# Patient Record
Sex: MALE | Race: WHITE | NOT HISPANIC OR LATINO | Employment: OTHER | URBAN - METROPOLITAN AREA
[De-identification: names, ages, dates, MRNs, and addresses within clinical notes are randomized per-mention and may not be internally consistent; named-entity substitution may affect disease eponyms.]

---

## 2017-03-24 RX ORDER — NIFEDIPINE 30 MG/1
TABLET, FILM COATED, EXTENDED RELEASE ORAL
Qty: 90 TABLET | Refills: 2 | Status: SHIPPED | OUTPATIENT
Start: 2017-03-24 | End: 2017-06-19 | Stop reason: SDUPTHER

## 2017-04-12 DIAGNOSIS — I10 ESSENTIAL HYPERTENSION: Primary | ICD-10-CM

## 2017-04-18 LAB
ALBUMIN SERPL-MCNC: 4 G/DL (ref 3.5–5.2)
ALBUMIN/GLOB SERPL: 1.3 G/DL
ALP SERPL-CCNC: 93 U/L (ref 39–117)
ALT SERPL W P-5'-P-CCNC: 14 U/L (ref 1–41)
ANION GAP SERPL CALCULATED.3IONS-SCNC: 12 MMOL/L
AST SERPL-CCNC: 18 U/L (ref 1–40)
BILIRUB SERPL-MCNC: 0.6 MG/DL (ref 0.1–1.2)
BUN BLD-MCNC: 13 MG/DL (ref 8–23)
BUN/CREAT SERPL: 16.3 (ref 7–25)
CALCIUM SPEC-SCNC: 9.1 MG/DL (ref 8.6–10.5)
CHLORIDE SERPL-SCNC: 102 MMOL/L (ref 98–107)
CHOLEST SERPL-MCNC: 137 MG/DL (ref 0–200)
CO2 SERPL-SCNC: 27 MMOL/L (ref 22–29)
CREAT BLD-MCNC: 0.8 MG/DL (ref 0.76–1.27)
GFR SERPL CREATININE-BSD FRML MDRD: 98 ML/MIN/1.73
GLOBULIN UR ELPH-MCNC: 3 GM/DL
GLUCOSE BLD-MCNC: 103 MG/DL (ref 65–99)
HDLC SERPL-MCNC: 60 MG/DL (ref 40–60)
LDLC SERPL CALC-MCNC: 63 MG/DL (ref 0–100)
LDLC/HDLC SERPL: 1.04 {RATIO}
POTASSIUM BLD-SCNC: 4.7 MMOL/L (ref 3.5–5.2)
PROT SERPL-MCNC: 7 G/DL (ref 6–8.5)
SODIUM BLD-SCNC: 141 MMOL/L (ref 136–145)
TRIGL SERPL-MCNC: 72 MG/DL (ref 0–150)
VLDLC SERPL-MCNC: 14.4 MG/DL (ref 5–40)

## 2017-04-18 PROCEDURE — 80061 LIPID PANEL: CPT | Performed by: FAMILY MEDICINE

## 2017-04-18 PROCEDURE — 80053 COMPREHEN METABOLIC PANEL: CPT | Performed by: FAMILY MEDICINE

## 2017-04-25 ENCOUNTER — OFFICE VISIT (OUTPATIENT)
Dept: FAMILY MEDICINE CLINIC | Facility: CLINIC | Age: 62
End: 2017-04-25

## 2017-04-25 VITALS
TEMPERATURE: 97.8 F | SYSTOLIC BLOOD PRESSURE: 138 MMHG | DIASTOLIC BLOOD PRESSURE: 64 MMHG | HEART RATE: 61 BPM | HEIGHT: 67 IN | BODY MASS INDEX: 48.97 KG/M2 | OXYGEN SATURATION: 97 % | WEIGHT: 312 LBS

## 2017-04-25 DIAGNOSIS — Z00.00 MEDICARE ANNUAL WELLNESS VISIT, INITIAL: Primary | ICD-10-CM

## 2017-04-25 DIAGNOSIS — G47.30 SLEEP APNEA, UNSPECIFIED TYPE: ICD-10-CM

## 2017-04-25 DIAGNOSIS — Z12.5 PROSTATE CANCER SCREENING: ICD-10-CM

## 2017-04-25 DIAGNOSIS — R35.0 URINARY FREQUENCY: ICD-10-CM

## 2017-04-25 LAB
DEVELOPER EXPIRATION DATE: NORMAL
DEVELOPER LOT NUMBER: NORMAL
EXPIRATION DATE: NORMAL
FECAL OCCULT BLOOD SCREEN, POC: NEGATIVE
Lab: NORMAL
NEGATIVE CONTROL: NEGATIVE
POSITIVE CONTROL: POSITIVE
PSA SERPL-MCNC: 1.8 NG/ML (ref 0–4)

## 2017-04-25 PROCEDURE — 99213 OFFICE O/P EST LOW 20 MIN: CPT | Performed by: FAMILY MEDICINE

## 2017-04-25 PROCEDURE — G0438 PPPS, INITIAL VISIT: HCPCS | Performed by: FAMILY MEDICINE

## 2017-04-25 PROCEDURE — 84153 ASSAY OF PSA TOTAL: CPT | Performed by: FAMILY MEDICINE

## 2017-04-25 PROCEDURE — 96160 PT-FOCUSED HLTH RISK ASSMT: CPT | Performed by: FAMILY MEDICINE

## 2017-04-25 PROCEDURE — 82270 OCCULT BLOOD FECES: CPT | Performed by: FAMILY MEDICINE

## 2017-04-25 RX ORDER — CIPROFLOXACIN 500 MG/1
TABLET, FILM COATED ORAL
Qty: 30 TABLET | Refills: 0 | Status: SHIPPED | OUTPATIENT
Start: 2017-04-25 | End: 2017-06-19

## 2017-04-25 RX ORDER — FLUTICASONE PROPIONATE 50 MCG
2 SPRAY, SUSPENSION (ML) NASAL DAILY
COMMUNITY
End: 2017-06-19 | Stop reason: SDUPTHER

## 2017-04-25 NOTE — PATIENT INSTRUCTIONS
Medicare Wellness  Personal Prevention Plan of Service     Date of Office Visit:  2017  Encounter Provider:  Braulio Sorto MD  Place of Service:  [facility] AND INTERNAL [facility]  Patient Name: Ryley Vazquez  :  1955    As part of the Medicare Wellness portion of your visit today, we are providing you with this personalized preventive plan of services (PPPS). This plan is based upon recommendations of the [place] Preventive Services Task Force (USPSTF) and the Advisory Committee on Immunization Practices (ACIP).    This lists the preventive care services that should be considered, and provides dates of when you are due. Items listed as completed are up-to-date and do not require any further intervention.    Health Maintenance   Topic Date Due   • TDAP/TD VACCINES (1 - Tdap) 1973   • HEPATITIS C SCREENING  03/15/2016   • MEDICARE ANNUAL WELLNESS  03/15/2016   • ZOSTER VACCINE  03/15/2016   • COLONOSCOPY  04/15/2025   • PNEUMOCOCCAL VACCINE (19-64 MEDIUM RISK)  Addressed   • INFLUENZA VACCINE  Completed       No orders of the defined types were placed in this encounter.      No Follow-up on file.

## 2017-04-25 NOTE — PROGRESS NOTES
QUICK REFERENCE INFORMATION:  The ABCs of the Annual Wellness Visit    Initial Medicare Wellness Visit    HEALTH RISK ASSESSMENT    1955    Recent Hospitalizations:  No recent hospitalization(s)..        Current Medical Providers:  Patient Care Team:  Braulio Sorto MD as PCP - General        Smoking Status:  History   Smoking Status   • Current Every Day Smoker   • Packs/day: 1.00   • Years: 14.00   Smokeless Tobacco   • Never Used       Alcohol Consumption:  History   Alcohol Use No     Comment: hx:alcohol abuse       Depression Screen:   PHQ-9 Depression Screening 4/25/2017   Little interest or pleasure in doing things 0   Feeling down, depressed, or hopeless 0   Trouble falling or staying asleep, or sleeping too much 0   Feeling tired or having little energy 0   Poor appetite or overeating 0   Feeling bad about yourself - or that you are a failure or have let yourself or your family down 0   Trouble concentrating on things, such as reading the newspaper or watching television 0   Moving or speaking so slowly that other people could have noticed. Or the opposite - being so fidgety or restless that you have been moving around a lot more than usual 0   Thoughts that you would be better off dead, or of hurting yourself in some way 0   PHQ-9 Total Score 0   If you checked off any problems, how difficult have these problems made it for you to do your work, take care of things at home, or get along with other people? Not difficult at all       Health Habits and Functional and Cognitive Screening:  Functional & Cognitive Status 4/25/2017   Do you have difficulty preparing food and eating? No   Do you have difficulty bathing yourself? No   Do you have difficulty getting dressed? No   Do you have difficulty using the toilet? No   Do you have difficulty moving around from place to place? No   In the past year have you fallen or experienced a near fall? No   Do you need help using the phone?  No   Are you deaf or do you  have serious difficulty hearing?  No   Do you need help with transportation? No   Do you need help shopping? No   Do you need help preparing meals?  No   Do you need help with housework?  No   Do you need help with laundry? No   Do you need help taking your medications? No   Do you need help managing money? No   Do you have difficulty concentrating, remembering or making decisions? No       Health Habits  Current Diet: Unhealthy Diet  Dental Exam: Not up to date  Eye Exam: Up to date  Exercise (times per week): 0 times per week  Current Exercise Activities Include: None          Does the patient have evidence of cognitive impairment? No    Asiprin use counseling: Start ASA 81 mg daily       Recent Lab Results:    Visual Acuity:  No exam data present    Age-appropriate Screening Schedule:  Refer to the list below for future screening recommendations based on patient's age, sex and/or medical conditions. Orders for these recommended tests are listed in the plan section. The patient has been provided with a written plan.    Health Maintenance   Topic Date Due   • TDAP/TD VACCINES (1 - Tdap) 02/08/1973   • ZOSTER VACCINE  03/15/2016   • COLONOSCOPY  04/15/2025   • PNEUMOCOCCAL VACCINE (19-64 MEDIUM RISK)  Addressed   • INFLUENZA VACCINE  Completed        Subjective   History of Present Illness    Ryley Vazquez is a 62 y.o. male who presents for an Annual Wellness Visit.    The following portions of the patient's history were reviewed and updated as appropriate: allergies, current medications, past family history, past medical history, past social history, past surgical history and problem list.    Outpatient Medications Prior to Visit   Medication Sig Dispense Refill   • albuterol (PROVENTIL HFA;VENTOLIN HFA) 108 (90 BASE) MCG/ACT inhaler Inhale 2 puffs every 4 (four) hours as needed for wheezing. 1 inhaler 11   • furosemide (LASIX) 20 MG tablet TAKE ONE TABLET BY MOUTH DAILY 90 tablet 2   • NIFEdipine CC (ADALAT CC)  "30 MG 24 hr tablet TAKE ONE TABLET BY MOUTH DAILY 90 tablet 2   • traZODone (DESYREL) 100 MG tablet Take 100 mg by mouth at night as needed.       No facility-administered medications prior to visit.        Patient Active Problem List   Diagnosis   • Sleep apnea   • Sinusitis   • Hypertension   • Asthmatic bronchitis   • Arthritis       Advance Care Planning:  has an advance directive - a copy HAS NOT been provided    Identification of Risk Factors:  Risk factors include: weight , unhealthy diet and inactivity.    Review of Systems    Compared to one year ago, the patient feels his physical health is the same.  Compared to one year ago, the patient feels his mental health is the same.    Objective     Physical Exam    Vitals:    04/25/17 1430   BP: 138/64   BP Location: Left arm   Patient Position: Sitting   Cuff Size: Large Adult   Pulse: 61   Temp: 97.8 °F (36.6 °C)   TempSrc: Oral   SpO2: 97%   Weight: (!) 312 lb (142 kg)   Height: 67\" (170.2 cm)   PainSc:   4   PainLoc: Generalized       Body mass index is 48.87 kg/(m^2).  Discussed the patient's BMI with him. The BMI is above average; BMI management plan is completed.    Assessment/Plan   Patient Self-Management and Personalized Health Advice  The patient has been provided with information about: diet, exercise and weight management and preventive services including:   · none.    Visit Diagnoses:    ICD-10-CM ICD-9-CM   1. Urinary frequency R35.0 788.41       No orders of the defined types were placed in this encounter.      Outpatient Encounter Prescriptions as of 4/25/2017   Medication Sig Dispense Refill   • albuterol (PROVENTIL HFA;VENTOLIN HFA) 108 (90 BASE) MCG/ACT inhaler Inhale 2 puffs every 4 (four) hours as needed for wheezing. 1 inhaler 11   • fluticasone (FLONASE) 50 MCG/ACT nasal spray 2 sprays into each nostril Daily.     • furosemide (LASIX) 20 MG tablet TAKE ONE TABLET BY MOUTH DAILY 90 tablet 2   • NIFEdipine CC (ADALAT CC) 30 MG 24 hr tablet " TAKE ONE TABLET BY MOUTH DAILY 90 tablet 2   • traZODone (DESYREL) 100 MG tablet Take 100 mg by mouth at night as needed.       No facility-administered encounter medications on file as of 4/25/2017.        Reviewed use of high risk medication in the elderly: not applicable  Reviewed for potential of harmful drug interactions in the elderly: not applicable    Follow Up:  No Follow-up on file.     An After Visit Summary and PPPS with all of these plans were given to the patient.        SUBJECTIVE:  The patient is a 62-year-old white male who is here for a couple issues.  He had general blood work done last week.  Refer to results.  His numbers look good.  He complains of urinary frequency for 4 days.  He is due a prostate exam.  He has sleep apnea wants to be referred for a study for this.     PAST MEDICAL HISTORY:  Reviewed.    REVIEW OF SYSTEMS:  Please see above; 14 point ROS otherwise negative.      OBJECTIVE: Vitals signs are reviewed and are stable.    HEENT: PERRLA.  []  Neck:  Supple.  []  Lungs:  Clear.    Heart:  Regular rate and rhythm.  []  Abdomen:   Soft, nontender.  []  Extremities:  No cyanosis, clubbing or edema.  []  Rectal: Prostate is 2+ mildly tender no masses Hemoccult negative.  Analysis is ordered and pending.  Patient is having a difficult time voiding here in the office.  He will bring one back if unsuccessful here.    ASSESSMENT:    []Prostate exam/possible prostatitis  SLEEP apnea  Hypertension    PLAN:  []Cipro 500 mg twice a day.  Patient is referred for sleep study.  PSA is ordered and pending.  A call on his labs.  He will notify me if problems or if not better in a couple days.  Much of this encounter note is an electronic transcription/translation of spoken language to printed text.  The electronic translation of spoken language may permit erroneous, or at times, nonsensical words or phrases to be inadvertently transcribed.  Although I have reviewed the note for such errors, some may  still exist.

## 2017-06-19 ENCOUNTER — OFFICE VISIT (OUTPATIENT)
Dept: FAMILY MEDICINE CLINIC | Facility: CLINIC | Age: 62
End: 2017-06-19

## 2017-06-19 VITALS
SYSTOLIC BLOOD PRESSURE: 140 MMHG | BODY MASS INDEX: 48.18 KG/M2 | HEART RATE: 91 BPM | OXYGEN SATURATION: 94 % | DIASTOLIC BLOOD PRESSURE: 80 MMHG | WEIGHT: 307 LBS | TEMPERATURE: 98.2 F | HEIGHT: 67 IN

## 2017-06-19 DIAGNOSIS — E66.01 MORBID OBESITY DUE TO EXCESS CALORIES (HCC): ICD-10-CM

## 2017-06-19 DIAGNOSIS — I10 ESSENTIAL HYPERTENSION: ICD-10-CM

## 2017-06-19 DIAGNOSIS — J45.21 ASTHMATIC BRONCHITIS, MILD INTERMITTENT, WITH ACUTE EXACERBATION: Primary | ICD-10-CM

## 2017-06-19 DIAGNOSIS — Z72.0 TOBACCO ABUSE: ICD-10-CM

## 2017-06-19 DIAGNOSIS — J30.2 SEASONAL ALLERGIC RHINITIS, UNSPECIFIED ALLERGIC RHINITIS TRIGGER: ICD-10-CM

## 2017-06-19 DIAGNOSIS — G47.00 INSOMNIA, UNSPECIFIED TYPE: ICD-10-CM

## 2017-06-19 DIAGNOSIS — J01.00 ACUTE MAXILLARY SINUSITIS, RECURRENCE NOT SPECIFIED: ICD-10-CM

## 2017-06-19 PROCEDURE — 99213 OFFICE O/P EST LOW 20 MIN: CPT | Performed by: NURSE PRACTITIONER

## 2017-06-19 RX ORDER — AMOXICILLIN 875 MG/1
875 TABLET, COATED ORAL 2 TIMES DAILY
Qty: 20 TABLET | Refills: 0 | Status: SHIPPED | OUTPATIENT
Start: 2017-06-19 | End: 2017-08-02

## 2017-06-19 RX ORDER — FUROSEMIDE 20 MG/1
20 TABLET ORAL DAILY
Qty: 90 TABLET | Refills: 3 | Status: SHIPPED | OUTPATIENT
Start: 2017-06-19 | End: 2018-09-27 | Stop reason: SDUPTHER

## 2017-06-19 RX ORDER — TRAZODONE HYDROCHLORIDE 100 MG/1
100 TABLET ORAL NIGHTLY PRN
Qty: 30 TABLET | Refills: 5 | Status: SHIPPED | OUTPATIENT
Start: 2017-06-19 | End: 2020-01-01

## 2017-06-19 RX ORDER — NIFEDIPINE 30 MG/1
90 TABLET, FILM COATED, EXTENDED RELEASE ORAL DAILY
Qty: 90 TABLET | Refills: 3 | Status: SHIPPED | OUTPATIENT
Start: 2017-06-19 | End: 2018-05-29 | Stop reason: DRUGHIGH

## 2017-06-19 RX ORDER — FLUTICASONE PROPIONATE 50 MCG
2 SPRAY, SUSPENSION (ML) NASAL DAILY
Qty: 1 EACH | Refills: 11 | Status: SHIPPED | OUTPATIENT
Start: 2017-06-19 | End: 2017-10-09

## 2017-06-19 NOTE — PROGRESS NOTES
Subjective   Ryley Vazquez is a 62 y.o. male.     History of Present Illness   C/o sinus tenderness and congestion x 3 wks, no ear pain or sore throat, with SOA and wheezing worse HS, with prod cough, no fevers, no one sick at home or work, went to [facility] and tx allergies on flonase NS daily but states gets this every year with season change, Was last tx amox 875 07/16 for asthmatic bronchitis and ceftin 250 mg 09/17 resolved, NKDA  Also request refill on nifedipine 30 mg daily and lasix 20 mg every other day, no CP dizziness HA but some LE edema, on trazodone 100 mg prn sleep, pending sleep study later this month    The following portions of the patient's history were reviewed and updated as appropriate: allergies, current medications, past family history, past medical history, past social history, past surgical history and problem list.    Review of Systems   Constitutional: Negative for fever.   HENT: Positive for congestion, facial swelling, postnasal drip and sinus pressure. Negative for ear discharge, ear pain, hearing loss, mouth sores, sore throat, trouble swallowing and voice change.    Respiratory: Positive for cough, shortness of breath and wheezing.    Cardiovascular: Positive for leg swelling. Negative for chest pain and palpitations.   Allergic/Immunologic: Positive for environmental allergies.   Neurological: Negative for dizziness and headaches.   Hematological: Positive for adenopathy.   Psychiatric/Behavioral: Positive for sleep disturbance. Negative for suicidal ideas.   All other systems reviewed and are negative.      Objective   Physical Exam   Constitutional: He is oriented to person, place, and time. He appears well-developed and well-nourished.   HENT:   Head: Normocephalic and atraumatic.   Right Ear: Hearing normal. Tympanic membrane is erythematous.   Left Ear: Hearing normal. Tympanic membrane is erythematous.   Nose: Mucosal edema present. Right sinus exhibits maxillary sinus tenderness.  Right sinus exhibits no frontal sinus tenderness. Left sinus exhibits maxillary sinus tenderness. Left sinus exhibits no frontal sinus tenderness.   Mouth/Throat: Posterior oropharyngeal edema present. No oropharyngeal exudate or posterior oropharyngeal erythema.   Eyes: Conjunctivae and EOM are normal. Pupils are equal, round, and reactive to light.   Neck: Normal range of motion. Neck supple. No thyromegaly present.   Cardiovascular: Normal rate, regular rhythm and normal heart sounds.    Pulmonary/Chest: Effort normal. He has wheezes (right middle and lower lobe).   Musculoskeletal: Normal range of motion.   Lymphadenopathy:     He has cervical adenopathy.   Neurological: He is alert and oriented to person, place, and time.   Skin: Skin is warm and dry.   Psychiatric: He has a normal mood and affect. His behavior is normal. Judgment and thought content normal.   Vitals reviewed.      Assessment/Plan   Ryley was seen today for sinusitis and med refill.    Diagnoses and all orders for this visit:    Asthmatic bronchitis, mild intermittent, with acute exacerbation    Acute maxillary sinusitis, recurrence not specified    Tobacco abuse    Essential hypertension    Morbid obesity due to excess calories    Insomnia, unspecified type    Seasonal allergic rhinitis, unspecified allergic rhinitis trigger    Other orders  -     NIFEdipine CC (ADALAT CC) 30 MG 24 hr tablet; Take 3 tablets by mouth Daily.  -     traZODone (DESYREL) 100 MG tablet; Take 1 tablet by mouth At Night As Needed for Sleep.  -     fluticasone (FLONASE) 50 MCG/ACT nasal spray; 2 sprays into each nostril Daily.  -     furosemide (LASIX) 20 MG tablet; Take 1 tablet by mouth Daily.  -     amoxicillin (AMOXIL) 875 MG tablet; Take 1 tablet by mouth 2 (Two) Times a Day.    erx amox 875 BID x 10 days, increase H20 and rest, refill flonase and use albuterol inhaler at home prn, call or RTC if sx persist or worsen, refill nifedipine, lasix, trazodone, Enc  healthy diet and regular exercise for wt loss, enc smoking cessation

## 2017-06-19 NOTE — PATIENT INSTRUCTIONS
erx amox 875 BID x 10 days, increase H20 and rest, refill flonase and use albuterol inhaler at home prn, call or RTC if sx persist or worsen, refill nifedipine, lasix, trazodone, Enc healthy diet and regular exercise for wt loss, enc smoking cessation

## 2017-06-20 ENCOUNTER — TELEPHONE (OUTPATIENT)
Dept: FAMILY MEDICINE CLINIC | Facility: CLINIC | Age: 62
End: 2017-06-20

## 2017-06-20 RX ORDER — NIFEDIPINE 30 MG/1
90 TABLET, FILM COATED, EXTENDED RELEASE ORAL DAILY
Qty: 90 TABLET | Refills: 3 | Status: CANCELLED | OUTPATIENT
Start: 2017-06-20

## 2017-08-02 ENCOUNTER — OFFICE VISIT (OUTPATIENT)
Dept: FAMILY MEDICINE CLINIC | Facility: CLINIC | Age: 62
End: 2017-08-02

## 2017-08-02 VITALS
HEART RATE: 65 BPM | BODY MASS INDEX: 49.35 KG/M2 | HEIGHT: 67 IN | SYSTOLIC BLOOD PRESSURE: 130 MMHG | OXYGEN SATURATION: 95 % | TEMPERATURE: 98.4 F | RESPIRATION RATE: 16 BRPM | WEIGHT: 314.4 LBS | DIASTOLIC BLOOD PRESSURE: 80 MMHG

## 2017-08-02 DIAGNOSIS — J01.10 ACUTE FRONTAL SINUSITIS, RECURRENCE NOT SPECIFIED: Primary | ICD-10-CM

## 2017-08-02 PROCEDURE — 99213 OFFICE O/P EST LOW 20 MIN: CPT | Performed by: NURSE PRACTITIONER

## 2017-08-02 RX ORDER — AMOXICILLIN 875 MG/1
875 TABLET, COATED ORAL 2 TIMES DAILY
Qty: 14 TABLET | Refills: 0 | Status: SHIPPED | OUTPATIENT
Start: 2017-08-02 | End: 2017-08-09

## 2017-08-02 RX ORDER — ALBUTEROL SULFATE 90 UG/1
2 AEROSOL, METERED RESPIRATORY (INHALATION) EVERY 4 HOURS PRN
Qty: 1 INHALER | Refills: 11 | Status: SHIPPED | OUTPATIENT
Start: 2017-08-02 | End: 2018-09-13 | Stop reason: SDUPTHER

## 2017-08-02 NOTE — PATIENT INSTRUCTIONS
Start amoxicillin 875mg 2x day for 7 days take with food.  Refilled albuterol inhaler, educated patient about use.   Trial Breo, 1 puff daily.   Cont flonase daily.  If symptoms persist 5 days or worsen call office.   Increase fluid intake, get plenty of rest.   Patient agrees with plan of care and understands instructions. Call if worsening symptoms or any problems or concerns.

## 2017-08-02 NOTE — PROGRESS NOTES
Subjective   Ryley Vazquez is a 62 y.o. male.     History of Present Illness   C/o cough,congestion, nasal drainage, HA, symptoms started about 2 weeks ago, he states he gets this a couple of times per year, [place], then got cold, he denies fever, he has nasal congestion, he does use flonase at home, he needs refill on inhaler today. He uses this about once a day regularly. He does have a productive cough, clear in color, he has noticed wheezing, denies SOA, he does smoke about 1/2 ppd for about 25 years.     The following portions of the patient's history were reviewed and updated as appropriate: allergies, current medications, past family history, past medical history, past social history, past surgical history and problem list.    Review of Systems   Constitutional: Negative for chills, diaphoresis and fever.   HENT: Positive for congestion, rhinorrhea and sinus pressure. Negative for ear pain, postnasal drip and sore throat.    Eyes: Negative for pain.   Respiratory: Positive for cough and wheezing. Negative for chest tightness and shortness of breath.    Cardiovascular: Negative for chest pain.   Musculoskeletal: Negative for arthralgias and myalgias.   Skin: Negative for pallor.   Allergic/Immunologic: Positive for environmental allergies.   Neurological: Negative for dizziness, light-headedness and headaches.   All other systems reviewed and are negative.      Objective   Physical Exam   Constitutional: He is oriented to person, place, and time. He appears well-developed and well-nourished.   HENT:   Head: Normocephalic.   Right Ear: Tympanic membrane and ear canal normal.   Left Ear: Ear canal normal. Tympanic membrane is erythematous.   Nose: Right sinus exhibits frontal sinus tenderness. Left sinus exhibits frontal sinus tenderness.   Mouth/Throat: Uvula is midline, oropharynx is clear and moist and mucous membranes are normal.   Eyes: Pupils are equal, round, and reactive to  light.   Neck: Neck supple.   Cardiovascular: Normal rate, regular rhythm and normal heart sounds.    Pulmonary/Chest: Effort normal and breath sounds normal. No respiratory distress. He has no decreased breath sounds. He has no wheezes.   Musculoskeletal: Normal range of motion.   Lymphadenopathy:     He has no cervical adenopathy.   Neurological: He is alert and oriented to person, place, and time.   Skin: Skin is warm and dry.   Psychiatric: He has a normal mood and affect. His behavior is normal. Judgment and thought content normal.   Nursing note and vitals reviewed.      Assessment/Plan   Ryley was seen today for sinusitis.    Diagnoses and all orders for this visit:    Acute frontal sinusitis, recurrence not specified    Other orders  -     albuterol (PROVENTIL HFA;VENTOLIN HFA) 108 (90 BASE) MCG/ACT inhaler; Inhale 2 puffs Every 4 (Four) Hours As Needed for Wheezing.  -     amoxicillin (AMOXIL) 875 MG tablet; Take 1 tablet by mouth 2 (Two) Times a Day for 7 days.              Start amoxicillin 875mg 2x day for 7 days take with food.  Refilled albuterol inhaler, educated patient about use.   Trial Breo, 1 puff daily.   Cont flonase daily.  Encourage smoking cessation.  If symptoms persist 5 days or worsen call office.   Increase fluid intake, get plenty of rest.   Patient agrees with plan of care and understands instructions. Call if worsening symptoms or any problems or concerns.

## 2017-08-07 ENCOUNTER — TELEPHONE (OUTPATIENT)
Dept: FAMILY MEDICINE CLINIC | Facility: CLINIC | Age: 62
End: 2017-08-07

## 2017-08-07 NOTE — TELEPHONE ENCOUNTER
Pt stated breo is working. Wants a rx sent to pharmacy and coupon for rx.   Pt stated it has increased his blood pressure. Running 145-160/ 85-88.  When first takes it, chest feels heavy but it goes away.

## 2017-08-07 NOTE — TELEPHONE ENCOUNTER
If BP elevated and SE should stop medication, we can trial a different inhaler, if chest heaviness or pain go to the ER. If BP remains elevated f/u in office.

## 2017-08-09 ENCOUNTER — OFFICE VISIT (OUTPATIENT)
Dept: FAMILY MEDICINE CLINIC | Facility: CLINIC | Age: 62
End: 2017-08-09

## 2017-08-09 VITALS
TEMPERATURE: 99.5 F | DIASTOLIC BLOOD PRESSURE: 60 MMHG | HEART RATE: 67 BPM | SYSTOLIC BLOOD PRESSURE: 120 MMHG | BODY MASS INDEX: 49.35 KG/M2 | HEIGHT: 67 IN | RESPIRATION RATE: 16 BRPM | WEIGHT: 314.4 LBS | OXYGEN SATURATION: 94 %

## 2017-08-09 DIAGNOSIS — R05.9 COUGH: ICD-10-CM

## 2017-08-09 DIAGNOSIS — I10 ESSENTIAL HYPERTENSION: Primary | ICD-10-CM

## 2017-08-09 DIAGNOSIS — F17.200 CURRENT SMOKER: ICD-10-CM

## 2017-08-09 PROCEDURE — 71020 XR CHEST 2 VW: CPT | Performed by: NURSE PRACTITIONER

## 2017-08-09 PROCEDURE — 99213 OFFICE O/P EST LOW 20 MIN: CPT | Performed by: NURSE PRACTITIONER

## 2017-08-09 PROCEDURE — 93000 ELECTROCARDIOGRAM COMPLETE: CPT | Performed by: NURSE PRACTITIONER

## 2017-08-09 NOTE — PROGRESS NOTES
Subjective   Ryley Vazquez is a 62 y.o. male.     History of Present Illness   Here today to f/u, he was seen on 8/2/17, given trial of breo, he called office liked breo and wanted rx, he also noticed elevated BP at home and chest pains, he states BP at home was 150/80s, high for him, he was also having chest pressure at that time. He states this is resolved. He denies CP today, he has been coughing, mucous breaking up, and has had drainage. He states BP has returned to normal. Normotensive today. He denies SOA, change in vision, LE edema, HA. He sees Dr. Hernandes, last saw in 10/16, echo normal. Saw for irregular heart beat, he also has a hx of sleep apnea, gets CPAP tomorrow. He smokes less than 1 ppd for about 17 years, he has tried chantix but did not help. He states he has not taken lasix in about a month because it made him dizzy.     The following portions of the patient's history were reviewed and updated as appropriate: allergies, current medications, past family history, past medical history, past social history, past surgical history and problem list.    Review of Systems   Constitutional: Negative for chills, diaphoresis and fever.   HENT: Positive for congestion. Negative for ear pain.    Eyes: Negative for photophobia and visual disturbance.   Respiratory: Positive for cough and chest tightness. Negative for shortness of breath and wheezing.    Cardiovascular: Negative for chest pain, palpitations and leg swelling.   Musculoskeletal: Negative for arthralgias and myalgias.   Skin: Negative for pallor.   Neurological: Negative for dizziness, light-headedness and headaches.   All other systems reviewed and are negative.      Objective   Physical Exam   Constitutional: He is oriented to person, place, and time. He appears well-developed and well-nourished.   HENT:   Head: Normocephalic.   Eyes: Pupils are equal, round, and reactive to light.   Neck: Neck supple.   Cardiovascular: Normal rate, regular  rhythm, normal heart sounds and intact distal pulses.    Pulses:       Radial pulses are 2+ on the right side, and 2+ on the left side.        Dorsalis pedis pulses are 2+ on the right side, and 2+ on the left side.        Posterior tibial pulses are 2+ on the right side, and 2+ on the left side.   Pulmonary/Chest: Effort normal. No respiratory distress. He has no decreased breath sounds. He has wheezes in the right upper field and the left upper field. He has no rhonchi. He has no rales. He exhibits no tenderness.   Musculoskeletal: Normal range of motion. He exhibits edema (bilat LE pitting 1+. ).   Neurological: He is alert and oriented to person, place, and time.   Skin: Skin is warm and dry.   Psychiatric: He has a normal mood and affect. His behavior is normal. Judgment and thought content normal.   Nursing note and vitals reviewed.  cxr today for cough, current smoker, no comparison noted, shows NAD, awaiting radiology over read.     Assessment/Plan   Ryley was seen today for follow-up.    Diagnoses and all orders for this visit:    Essential hypertension  -     XR Chest 2 View    Current smoker  -     XR Chest 2 View    Cough  -     XR Chest 2 View    Other orders  -     Fluticasone Furoate-Vilanterol (BREO ELLIPTA) 100-25 MCG/INH aerosol powder ; Inhale 1 puff Daily.        cxr today, will call with results.  EKG today, faxed to [place] [facility], per Dr. Perez, call back from office. similar to last EKG, f/u in office. Patient to call with appt as he is established there.   Discussed plan of care with Dr. Trejo.  Cont medications as prescribed, cont breo as tolerated.   Cont lasix, may break in half if needed for dizziness.   Encourage low salt diet.   Increase fluid intake, get plenty of rest.   If CP, SOA, worsening symptoms advised to go to the ER.   Patient agrees with plan of care and understands instructions. Call if worsening symptoms or any problems or concerns.

## 2017-08-09 NOTE — PATIENT INSTRUCTIONS
"cxr today, will call with results.  EKG today, faxed to [facility], per Dr. Perez, call back from office. similar to last EKG, f/u in office. Patient to call with appt as he is established there.   Discussed plan of care with Dr. Trejo.  Cont medications as prescribed, cont breo as tolerated.   Cont lasix, may break in half if needed for dizziness.   Encourage low salt diet.   Increase fluid intake, get plenty of rest.   If CP, SOA, worsening symptoms advised to go to the ER.   Patient agrees with plan of care and understands instructions. Call if worsening symptoms or any problems or concerns.     DASH Eating Plan  DASH stands for \"Dietary Approaches to Stop Hypertension.\" The DASH eating plan is a healthy eating plan that has been shown to reduce high blood pressure (hypertension). Additional health benefits may include reducing the risk of type 2 diabetes mellitus, heart disease, and stroke. The DASH eating plan may also help with weight loss.  WHAT DO I NEED TO KNOW ABOUT THE DASH EATING PLAN?  For the DASH eating plan, you will follow these general guidelines:  · Choose foods with less than 150 milligrams of sodium per serving (as listed on the food label).  · Use salt-free seasonings or herbs instead of table salt or sea salt.  · Check with your health care provider or pharmacist before using salt substitutes.  · Eat lower-sodium products. These are often labeled as \"low-sodium\" or \"no salt added.\"  · Eat fresh foods. Avoid eating a lot of canned foods.  · Eat more vegetables, fruits, and low-fat dairy products.  · Choose whole grains. Look for the word \"whole\" as the first word in the ingredient list.  · Choose fish and skinless chicken or turkey more often than red meat. Limit fish, poultry, and meat to 6 oz (170 g) each day.  · Limit sweets, desserts, sugars, and sugary drinks.  · Choose heart-healthy fats.  · Eat more home-cooked food and less restaurant, buffet, and fast food.  · Limit " fried foods.  · Do not black foods. Cook foods using methods such as baking, boiling, grilling, and broiling instead.  · When eating at a restaurant, ask that your food be prepared with less salt, or no salt if possible.  WHAT FOODS CAN I EAT?  Seek help from a dietitian for individual calorie needs.  Grains  Whole grain or whole wheat bread. Brown rice. Whole grain or whole wheat pasta. Quinoa, bulgur, and whole grain cereals. Low-sodium cereals. Corn or whole wheat flour tortillas. Whole grain cornbread. Whole grain crackers. Low-sodium crackers.  Vegetables  Fresh or frozen vegetables (raw, steamed, roasted, or grilled). Low-sodium or reduced-sodium tomato and vegetable juices. Low-sodium or reduced-sodium tomato sauce and paste. Low-sodium or reduced-sodium canned vegetables.   Fruits  All fresh, canned (in natural juice), or frozen fruits.  Meat and Other Protein Products  Ground beef (85% or leaner), grass-fed beef, or beef trimmed of fat. Skinless chicken or turkey. Ground chicken or turkey. Pork trimmed of fat. All fish and seafood. Eggs. Dried beans, peas, or lentils. Unsalted nuts and seeds. Unsalted canned beans.  Dairy  Low-fat dairy products, such as skim or 1% milk, 2% or reduced-fat cheeses, low-fat ricotta or cottage cheese, or plain low-fat yogurt. Low-sodium or reduced-sodium cheeses.  Fats and Oils  Tub margarines without trans fats. Light or reduced-fat mayonnaise and salad dressings (reduced sodium). Avocado. Safflower, olive, or canola oils. Natural peanut or almond butter.  Other  Unsalted popcorn and pretzels.  The items listed above may not be a complete list of recommended foods or beverages. Contact your dietitian for more options.  WHAT FOODS ARE NOT RECOMMENDED?  Grains  White bread. White pasta. White rice. Refined cornbread. Bagels and croissants. Crackers that contain trans fat.  Vegetables  Creamed or fried vegetables. Vegetables in a cheese sauce. Regular canned vegetables. Regular  canned tomato sauce and paste. Regular tomato and vegetable juices.  Fruits  Canned fruit in light or heavy syrup. Fruit juice.  Meat and Other Protein Products  Fatty cuts of meat. Ribs, chicken wings, benavides, sausage, bologna, salami, chitterlings, fatback, hot dogs, bratwurst, and packaged luncheon meats. Salted nuts and seeds. Canned beans with salt.  Dairy  Whole or 2% milk, cream, half-and-half, and cream cheese. Whole-fat or sweetened yogurt. Full-fat cheeses or blue cheese. Nondairy creamers and whipped toppings. Processed cheese, cheese spreads, or cheese curds.  Condiments  Onion and garlic salt, seasoned salt, table salt, and sea salt. Canned and packaged gravies. Worcestershire sauce. Tartar sauce. Barbecue sauce. Teriyaki sauce. Soy sauce, including reduced sodium. Steak sauce. Fish sauce. Oyster sauce. Cocktail sauce. Horseradish. Ketchup and mustard. Meat flavorings and tenderizers. Bouillon cubes. Hot sauce. Tabasco sauce. Marinades. Taco seasonings. Relishes.  Fats and Oils  Butter, stick margarine, lard, shortening, ghee, and benavides fat. Coconut, palm kernel, or palm oils. Regular salad dressings.  Other  Pickles and olives. Salted popcorn and pretzels.  The items listed above may not be a complete list of foods and beverages to avoid. Contact your dietitian for more information.  WHERE CAN I FIND MORE INFORMATION?  [employer]: www.nhlbi.nih.gov/health/health-topics/topics/dash/     This information is not intended to replace advice given to you by your health care provider. Make sure you discuss any questions you have with your health care provider.     Document Released: 12/06/2012 Document Revised: 04/10/2017 Document Reviewed: 10/22/2014  [employer] Interactive Patient Education ©2017 [employer]

## 2017-08-10 NOTE — PROGRESS NOTES
Procedure     ECG 12 Lead  Date/Time: 8/9/2017 2:22 PM  Performed by: ELVIRA CRUMP  Authorized by: ELVIRA CRUMP   Previous ECG: no previous ECG available  Rhythm: sinus rhythm  Rate: normal  QRS axis: normal  Clinical impression: normal ECG  Comments: Discussed with Dr. Trejo, faxed to Dr. Perez, cardiology.

## 2017-08-11 ENCOUNTER — TELEPHONE (OUTPATIENT)
Dept: FAMILY MEDICINE CLINIC | Facility: CLINIC | Age: 62
End: 2017-08-11

## 2017-08-11 NOTE — TELEPHONE ENCOUNTER
----- Message from AGUSTÍN Evans sent at 8/11/2017  7:59 AM EDT -----  Please call patient with results. cxr was normal, f/u in office if symptoms persist.    Pt informed of CXR results

## 2017-08-15 ENCOUNTER — OFFICE VISIT (OUTPATIENT)
Dept: FAMILY MEDICINE CLINIC | Facility: CLINIC | Age: 62
End: 2017-08-15

## 2017-08-15 VITALS
WEIGHT: 314 LBS | RESPIRATION RATE: 20 BRPM | DIASTOLIC BLOOD PRESSURE: 70 MMHG | BODY MASS INDEX: 49.28 KG/M2 | OXYGEN SATURATION: 93 % | HEIGHT: 67 IN | HEART RATE: 78 BPM | SYSTOLIC BLOOD PRESSURE: 140 MMHG | TEMPERATURE: 99.1 F

## 2017-08-15 DIAGNOSIS — Z72.0 TOBACCO ABUSE: ICD-10-CM

## 2017-08-15 DIAGNOSIS — Z88.9 MULTIPLE ALLERGIES: Primary | ICD-10-CM

## 2017-08-15 PROCEDURE — 99213 OFFICE O/P EST LOW 20 MIN: CPT | Performed by: FAMILY MEDICINE

## 2017-08-15 NOTE — PROGRESS NOTES
SUBJECTIVE:  The patient is a 62-year-old white male who comes in for follow-up.  He was treated for a sinus infection on August 9 and is better.  However he suffers from chronic allergy drainage cough symptoms.  He recently got a CPAP which helps his sleep however he wakes up with major congestion in the mornings.  He did start smoking again.     PAST MEDICAL HISTORY:  Reviewed.    REVIEW OF SYSTEMS:  Please see above; 14 point ROS otherwise negative.      OBJECTIVE: Vitals signs are reviewed and are stable.    HEENT: PERRLA.   Left nasal turbinate is swollen.  Neck:  Supple.    Lungs:  Clear.    Heart:  Regular rate and rhythm.    Abdomen:   Soft, nontender.    Extremities:  No cyanosis, clubbing or edema.      ASSESSMENT:    Allergies tobacco abuse      PLAN: He will be referred to an allergist.  He's advised to stop smoking.  He will continue his current allergy maintenance medicines.    Much of this encounter note is an electronic transcription/translation of spoken language to printed text.  The electronic translation of spoken language may permit erroneous, or at times, nonsensical words or phrases to be inadvertently transcribed.  Although I have reviewed the note for such errors, some may still exist.]

## 2017-08-22 ENCOUNTER — TELEPHONE (OUTPATIENT)
Dept: FAMILY MEDICINE CLINIC | Facility: CLINIC | Age: 62
End: 2017-08-22

## 2017-09-11 ENCOUNTER — CLINICAL SUPPORT (OUTPATIENT)
Dept: FAMILY MEDICINE CLINIC | Facility: CLINIC | Age: 62
End: 2017-09-11

## 2017-09-11 DIAGNOSIS — Z91.09 ENVIRONMENTAL ALLERGIES: Primary | ICD-10-CM

## 2017-09-11 PROCEDURE — 95117 IMMUNOTHERAPY INJECTIONS: CPT | Performed by: FAMILY MEDICINE

## 2017-09-18 ENCOUNTER — CLINICAL SUPPORT (OUTPATIENT)
Dept: FAMILY MEDICINE CLINIC | Facility: CLINIC | Age: 62
End: 2017-09-18

## 2017-09-18 DIAGNOSIS — Z91.09 ENVIRONMENTAL ALLERGIES: Primary | ICD-10-CM

## 2017-09-18 PROCEDURE — 95117 IMMUNOTHERAPY INJECTIONS: CPT | Performed by: FAMILY MEDICINE

## 2017-09-26 DIAGNOSIS — E87.1 HYPONATREMIA: Primary | ICD-10-CM

## 2017-10-03 ENCOUNTER — LAB (OUTPATIENT)
Dept: FAMILY MEDICINE CLINIC | Facility: CLINIC | Age: 62
End: 2017-10-03

## 2017-10-03 ENCOUNTER — CLINICAL SUPPORT (OUTPATIENT)
Dept: FAMILY MEDICINE CLINIC | Facility: CLINIC | Age: 62
End: 2017-10-03

## 2017-10-03 DIAGNOSIS — E87.1 HYPONATREMIA: ICD-10-CM

## 2017-10-03 DIAGNOSIS — J30.2 SEASONAL ALLERGIC RHINITIS, UNSPECIFIED CHRONICITY, UNSPECIFIED TRIGGER: Primary | ICD-10-CM

## 2017-10-03 LAB
ANION GAP SERPL CALCULATED.3IONS-SCNC: 15.2 MMOL/L
BUN BLD-MCNC: 17 MG/DL (ref 8–23)
BUN/CREAT SERPL: 22.7 (ref 7–25)
CALCIUM SPEC-SCNC: 9.1 MG/DL (ref 8.6–10.5)
CHLORIDE SERPL-SCNC: 102 MMOL/L (ref 98–107)
CO2 SERPL-SCNC: 24.8 MMOL/L (ref 22–29)
CREAT BLD-MCNC: 0.75 MG/DL (ref 0.76–1.27)
GFR SERPL CREATININE-BSD FRML MDRD: 105 ML/MIN/1.73
GLUCOSE BLD-MCNC: 103 MG/DL (ref 65–99)
POTASSIUM BLD-SCNC: 4.1 MMOL/L (ref 3.5–5.2)
SODIUM BLD-SCNC: 142 MMOL/L (ref 136–145)

## 2017-10-03 PROCEDURE — 95117 IMMUNOTHERAPY INJECTIONS: CPT | Performed by: FAMILY MEDICINE

## 2017-10-03 PROCEDURE — 36415 COLL VENOUS BLD VENIPUNCTURE: CPT | Performed by: FAMILY MEDICINE

## 2017-10-03 PROCEDURE — 80048 BASIC METABOLIC PNL TOTAL CA: CPT | Performed by: FAMILY MEDICINE

## 2017-10-05 ENCOUNTER — TELEPHONE (OUTPATIENT)
Dept: FAMILY MEDICINE CLINIC | Facility: CLINIC | Age: 62
End: 2017-10-05

## 2017-10-09 ENCOUNTER — OFFICE VISIT (OUTPATIENT)
Dept: CARDIOLOGY | Facility: CLINIC | Age: 62
End: 2017-10-09

## 2017-10-09 VITALS
DIASTOLIC BLOOD PRESSURE: 80 MMHG | SYSTOLIC BLOOD PRESSURE: 156 MMHG | BODY MASS INDEX: 46.46 KG/M2 | HEART RATE: 63 BPM | WEIGHT: 296 LBS | HEIGHT: 67 IN

## 2017-10-09 DIAGNOSIS — I71.21 ASCENDING AORTIC ANEURYSM (HCC): ICD-10-CM

## 2017-10-09 DIAGNOSIS — I49.3 PVC (PREMATURE VENTRICULAR CONTRACTION): Primary | ICD-10-CM

## 2017-10-09 DIAGNOSIS — E66.01 OBESITY, CLASS III, BMI 40-49.9 (MORBID OBESITY) (HCC): ICD-10-CM

## 2017-10-09 DIAGNOSIS — I10 ESSENTIAL HYPERTENSION: ICD-10-CM

## 2017-10-09 PROCEDURE — 93000 ELECTROCARDIOGRAM COMPLETE: CPT | Performed by: INTERNAL MEDICINE

## 2017-10-09 PROCEDURE — 99214 OFFICE O/P EST MOD 30 MIN: CPT | Performed by: INTERNAL MEDICINE

## 2017-10-09 RX ORDER — MONTELUKAST SODIUM 10 MG/1
10 TABLET ORAL NIGHTLY
COMMUNITY
Start: 2017-08-29

## 2017-10-09 NOTE — PROGRESS NOTES
Date of Office Visit: 10/09/17  Encounter Provider: Christopher Hernandes MD  Place of Service: [facility] CARDI[facility]  Patient Name: Ryley Vazquez  :1955      Chief Complaint   Patient presents with   • Aortic Aneurysm   • Hypertension     History of Present Illness  HPI Comments: Mr Vazquez is a 62-year-old gentleman with a history of hypertension, obesity and rheumatoid arthritis.  He's been evaluated in the past in  for chest pain with a normal perfusion stress test.  He was then found to have asymptomatic PVCs normal echocardiogram.  He now comes in however for evaluation.  He had a GI bug became dehydrated but was also coughing mentioned he was having some chest pain with that.  So they sent him to the emergency room was treated for dehydration but had a mildly elevated d-dimer study did a CT in 2 of his chest that showed no evidence of pulmonary embolus but did have mild before meals and aortic enlargement at 4.2 cm.  He denies any exertional pain or pressure but he doesn't do a lot of activity due to his arthritis.  He's had no orthopnea or PND use his CPAP at night for sleep apnea.  He denies palpitations, near-syncope or syncope.    Aortic Aneurysm   Associated symptoms include joint swelling. Pertinent negatives include no abdominal pain, congestion, diaphoresis, fever, headaches, myalgias, nausea, numbness, rash, vertigo, vomiting or weakness.   Hypertension   Pertinent negatives include no blurred vision, headaches or malaise/fatigue.         Past Medical History:   Diagnosis Date   • Arthritis    • Asthmatic bronchitis    • Hypertension    • Sinusitis    • Sleep apnea          Past Surgical History:   Procedure Laterality Date   • CERVICAL FUSION N/A    • COLONOSCOPY      polyps   • GASTRIC BYPASS     • HERNIA REPAIR     • ROTATOR CUFF REPAIR Right          Current Outpatient Prescriptions on File Prior to Visit   Medication Sig Dispense Refill   •  albuterol (PROVENTIL HFA;VENTOLIN HFA) 108 (90 BASE) MCG/ACT inhaler Inhale 2 puffs Every 4 (Four) Hours As Needed for Wheezing. 1 inhaler 11   • Fluticasone Furoate-Vilanterol (BREO ELLIPTA) 100-25 MCG/INH aerosol powder  Inhale 1 puff Daily. 1 each 3   • furosemide (LASIX) 20 MG tablet Take 1 tablet by mouth Daily. 90 tablet 3   • NIFEdipine CC (ADALAT CC) 30 MG 24 hr tablet Take 3 tablets by mouth Daily. (Patient taking differently: Take 30 mg by mouth Daily.) 90 tablet 3   • traZODone (DESYREL) 100 MG tablet Take 1 tablet by mouth At Night As Needed for Sleep. 30 tablet 5   • [DISCONTINUED] fluticasone (FLONASE) 50 MCG/ACT nasal spray 2 sprays into each nostril Daily. 1 each 11     Current Facility-Administered Medications on File Prior to Visit   Medication Dose Route Frequency Provider Last Rate Last Dose   • patient supplied allergy injection  0.2 mL Subcutaneous Once Braulio Sorto MD             Social History     Social History   • Marital status:      Spouse name: N/A   • Number of children: N/A   • Years of education: N/A     Occupational History   • Not on file.     Social History Main Topics   • Smoking status: Current Every Day Smoker     Packs/day: 1.00     Years: 14.00   • Smokeless tobacco: Never Used   • Alcohol use No      Comment: hx:alcohol abuse   • Drug use: No   • Sexual activity: Not on file     Other Topics Concern   • Not on file     Social History Narrative       Family History   Problem Relation Age of Onset   • Asthma Mother    • Cancer Father      COLON   • Arthritis Father    • Hypertension Father    • Diabetes Father    • Heart disease Brother    • Cancer Other    • Arthritis Other    • Alcohol abuse Other    • Hypertension Maternal Grandmother    • Diabetes Maternal Grandmother    • Stroke Paternal Grandmother    • Hypertension Paternal Grandmother    • Diabetes Paternal Grandmother    • Heart disease Paternal Grandfather          Review of Systems   Constitution: Negative  "for decreased appetite, diaphoresis, fever, weakness, malaise/fatigue, weight gain and weight loss.   HENT: Negative for congestion, hearing loss, nosebleeds and tinnitus.    Eyes: Negative for blurred vision, double vision, vision loss in left eye, vision loss in right eye and visual disturbance.   Cardiovascular:        As noted in HPI   Respiratory:        As noted HPI   Endocrine: Negative for cold intolerance and heat intolerance.   Hematologic/Lymphatic: Negative for bleeding problem. Does not bruise/bleed easily.   Skin: Negative for color change, flushing, itching and rash.   Musculoskeletal: Positive for joint pain and joint swelling. Negative for arthritis, back pain, muscle weakness and myalgias.   Gastrointestinal: Negative for bloating, abdominal pain, constipation, diarrhea, dysphagia, heartburn, hematemesis, hematochezia, melena, nausea and vomiting.   Genitourinary: Negative for bladder incontinence, dysuria, frequency, nocturia and urgency.   Neurological: Positive for paresthesias. Negative for dizziness, focal weakness, headaches, light-headedness, loss of balance, numbness and vertigo.   Psychiatric/Behavioral: Negative for depression, memory loss and substance abuse.       Procedures      ECG 12 Lead  Date/Time: 10/9/2017 11:02 AM  Performed by: SHAQ CHU  Authorized by: SHAQ CHU   Comparison: compared with previous ECG   Similar to previous ECG  Rhythm: sinus rhythm  Rate: normal  QRS axis: normal                 Objective:    /80 (BP Location: Right arm)  Pulse 63  Ht 67\" (170.2 cm)  Wt 296 lb (134 kg)  BMI 46.36 kg/m2       Physical Exam  Physical Exam   Constitutional: He is oriented to person, place, and time. He appears well-developed and well-nourished. No distress.   HENT:   Head: Normocephalic.   Eyes: Conjunctivae are normal. Pupils are equal, round, and reactive to light. No scleral icterus.   Neck: Normal carotid pulses, no hepatojugular reflux and no JVD " present. Carotid bruit is not present. No tracheal deviation, no edema and no erythema present. No thyromegaly present.   Cardiovascular: Normal rate, regular rhythm, S1 normal, S2 normal, normal heart sounds and intact distal pulses.   No extrasystoles are present. PMI is not displaced.  Exam reveals no gallop, no distant heart sounds and no friction rub.    No murmur heard.  Pulses:       Carotid pulses are 2+ on the right side, and 2+ on the left side.       Radial pulses are 2+ on the right side, and 2+ on the left side.        Femoral pulses are 2+ on the right side, and 2+ on the left side.       Dorsalis pedis pulses are 2+ on the right side, and 2+ on the left side.        Posterior tibial pulses are 2+ on the right side, and 2+ on the left side.   Pulmonary/Chest: Effort normal. No respiratory distress. He has decreased breath sounds (Bases). He has no wheezes. He has no rhonchi. He has no rales. He exhibits no tenderness.   Abdominal: Soft. Bowel sounds are normal. He exhibits no distension and no mass. There is no hepatosplenomegaly. There is no tenderness. There is no rebound and no guarding.   Musculoskeletal: He exhibits no edema, tenderness or deformity.   Neurological: He is alert and oriented to person, place, and time.   Skin: Skin is warm and dry. No rash noted. He is not diaphoretic. No cyanosis or erythema. No pallor. Nails show no clubbing.   Psychiatric: He has a normal mood and affect. His speech is normal and behavior is normal. Judgment and thought content normal.           Assessment:   1.  62-year-old gentleman with benign asymptomatic PVCs normal echocardiogram no further treatment needed.  2.  Ascending aortic enlargement clearly 4.2 cm is not really aneurysmal we'll follow him clinically with periodic echocardiograms to evaluate this.  He will see us in follow-up in a year consider echocardiogram at that time.  3.  History of hypertension his blood pressure he states is been running in  the 130s systolic range over 7080 diastolic range she'll continue to follow this if elevates he will call us back.  Resting heart rate is 60 beats a minute.  4.  Morbid obesity we again discussed his need to diet exercise and lose some weight.          Plan:

## 2017-10-10 ENCOUNTER — CLINICAL SUPPORT (OUTPATIENT)
Dept: FAMILY MEDICINE CLINIC | Facility: CLINIC | Age: 62
End: 2017-10-10

## 2017-10-10 DIAGNOSIS — Z91.09 ENVIRONMENTAL ALLERGIES: Primary | ICD-10-CM

## 2017-10-10 PROCEDURE — 95117 IMMUNOTHERAPY INJECTIONS: CPT | Performed by: FAMILY MEDICINE

## 2017-10-17 ENCOUNTER — CLINICAL SUPPORT (OUTPATIENT)
Dept: FAMILY MEDICINE CLINIC | Facility: CLINIC | Age: 62
End: 2017-10-17

## 2017-10-17 DIAGNOSIS — J30.2 SEASONAL ALLERGIC RHINITIS, UNSPECIFIED CHRONICITY, UNSPECIFIED TRIGGER: Primary | ICD-10-CM

## 2017-10-17 PROCEDURE — 95117 IMMUNOTHERAPY INJECTIONS: CPT | Performed by: INTERNAL MEDICINE

## 2017-10-24 ENCOUNTER — CLINICAL SUPPORT (OUTPATIENT)
Dept: FAMILY MEDICINE CLINIC | Facility: CLINIC | Age: 62
End: 2017-10-24

## 2017-10-24 DIAGNOSIS — J30.1 SEASONAL ALLERGIC RHINITIS DUE TO POLLEN, UNSPECIFIED CHRONICITY: Primary | ICD-10-CM

## 2017-10-24 PROCEDURE — 95117 IMMUNOTHERAPY INJECTIONS: CPT | Performed by: FAMILY MEDICINE

## 2017-11-02 ENCOUNTER — OFFICE VISIT (OUTPATIENT)
Dept: FAMILY MEDICINE CLINIC | Facility: CLINIC | Age: 62
End: 2017-11-02

## 2017-11-02 VITALS
WEIGHT: 305 LBS | SYSTOLIC BLOOD PRESSURE: 160 MMHG | OXYGEN SATURATION: 96 % | DIASTOLIC BLOOD PRESSURE: 84 MMHG | BODY MASS INDEX: 47.87 KG/M2 | HEIGHT: 67 IN | TEMPERATURE: 98.2 F | HEART RATE: 79 BPM

## 2017-11-02 DIAGNOSIS — I10 ESSENTIAL HYPERTENSION: Primary | ICD-10-CM

## 2017-11-02 DIAGNOSIS — J02.9 PHARYNGITIS, UNSPECIFIED ETIOLOGY: ICD-10-CM

## 2017-11-02 PROCEDURE — 99213 OFFICE O/P EST LOW 20 MIN: CPT | Performed by: FAMILY MEDICINE

## 2017-11-02 RX ORDER — FLUTICASONE PROPIONATE 50 MCG
2 SPRAY, SUSPENSION (ML) NASAL DAILY
COMMUNITY
End: 2020-01-01

## 2017-11-02 RX ORDER — AMOXICILLIN 875 MG/1
875 TABLET, COATED ORAL 2 TIMES DAILY
Qty: 20 TABLET | Refills: 0 | Status: SHIPPED | OUTPATIENT
Start: 2017-11-02 | End: 2018-01-15

## 2017-11-02 NOTE — PROGRESS NOTES
SUBJECTIVE:  The patient is  is a 62-year-old white male comes in for a couple reasons.  Lately his blood pressure is been running higher than usual.  Today is 160/84.  He currently takes Lasix 10 mg and nifedipine 30 mg.  Also he has a sore throat and congestion.  He was exposed to strep last week with a grandchild.  No fever or chills.    PAST MEDICAL HISTORY:  Reviewed.    REVIEW OF SYSTEMS:  Please see above; 14 point ROS otherwise negative.      OBJECTIVE: Vitals signs are reviewed and are stable.    HEENT: PERRLA.  Pharynx red.  TMs look okay  Neck:  Supple.    Lungs:  Rare rhonchi.  Heart:  Regular rate and rhythm.    Abdomen:   Soft, nontender.    Extremities:  No cyanosis, clubbing or edema.      ASSESSMENT:    Hypertension  Pharyngitis-exposure to strep    PLAN:  he will increase nifedipine from 30 to 60 mg daily.  Amoxicillin 875 twice a day.  He will follow-up in a couple days if no better with his respiratory symptoms.  He will follow-up next week regarding his blood pressure.  He will call if any problems.    Much of this encounter note is an electronic transcription/translation of spoken language to printed text.  The electronic translation of spoken language may permit erroneous, or at times, nonsensical words or phrases to be inadvertently transcribed.  Although I have reviewed the note for such errors, some may still exist.

## 2017-11-07 ENCOUNTER — CLINICAL SUPPORT (OUTPATIENT)
Dept: FAMILY MEDICINE CLINIC | Facility: CLINIC | Age: 62
End: 2017-11-07

## 2017-11-07 DIAGNOSIS — J30.1 SEASONAL ALLERGIC RHINITIS DUE TO POLLEN, UNSPECIFIED CHRONICITY: Primary | ICD-10-CM

## 2017-11-07 PROCEDURE — 95117 IMMUNOTHERAPY INJECTIONS: CPT | Performed by: FAMILY MEDICINE

## 2017-11-14 ENCOUNTER — CLINICAL SUPPORT (OUTPATIENT)
Dept: FAMILY MEDICINE CLINIC | Facility: CLINIC | Age: 62
End: 2017-11-14

## 2017-11-14 DIAGNOSIS — J30.1 SEASONAL ALLERGIC RHINITIS DUE TO POLLEN, UNSPECIFIED CHRONICITY: Primary | ICD-10-CM

## 2017-11-14 PROCEDURE — 95117 IMMUNOTHERAPY INJECTIONS: CPT | Performed by: FAMILY MEDICINE

## 2017-11-22 ENCOUNTER — CLINICAL SUPPORT (OUTPATIENT)
Dept: FAMILY MEDICINE CLINIC | Facility: CLINIC | Age: 62
End: 2017-11-22

## 2017-11-22 DIAGNOSIS — J30.2 SEASONAL ALLERGIC RHINITIS, UNSPECIFIED CHRONICITY, UNSPECIFIED TRIGGER: Primary | ICD-10-CM

## 2017-11-22 PROCEDURE — 95117 IMMUNOTHERAPY INJECTIONS: CPT | Performed by: INTERNAL MEDICINE

## 2017-11-28 ENCOUNTER — CLINICAL SUPPORT (OUTPATIENT)
Dept: FAMILY MEDICINE CLINIC | Facility: CLINIC | Age: 62
End: 2017-11-28

## 2017-11-28 DIAGNOSIS — J30.9 ALLERGIC RHINITIS, UNSPECIFIED CHRONICITY, UNSPECIFIED SEASONALITY, UNSPECIFIED TRIGGER: Primary | ICD-10-CM

## 2017-11-28 PROCEDURE — 95117 IMMUNOTHERAPY INJECTIONS: CPT | Performed by: FAMILY MEDICINE

## 2017-12-12 ENCOUNTER — CLINICAL SUPPORT (OUTPATIENT)
Dept: FAMILY MEDICINE CLINIC | Facility: CLINIC | Age: 62
End: 2017-12-12

## 2017-12-12 DIAGNOSIS — J30.2 SEASONAL ALLERGIC RHINITIS, UNSPECIFIED CHRONICITY, UNSPECIFIED TRIGGER: Primary | ICD-10-CM

## 2017-12-12 PROCEDURE — 95117 IMMUNOTHERAPY INJECTIONS: CPT | Performed by: FAMILY MEDICINE

## 2017-12-19 ENCOUNTER — CLINICAL SUPPORT (OUTPATIENT)
Dept: FAMILY MEDICINE CLINIC | Facility: CLINIC | Age: 62
End: 2017-12-19

## 2017-12-19 DIAGNOSIS — J30.2 SEASONAL ALLERGIC RHINITIS, UNSPECIFIED CHRONICITY, UNSPECIFIED TRIGGER: Primary | ICD-10-CM

## 2017-12-19 PROCEDURE — 95117 IMMUNOTHERAPY INJECTIONS: CPT | Performed by: FAMILY MEDICINE

## 2018-01-02 ENCOUNTER — CLINICAL SUPPORT (OUTPATIENT)
Dept: FAMILY MEDICINE CLINIC | Facility: CLINIC | Age: 63
End: 2018-01-02

## 2018-01-02 DIAGNOSIS — Z88.9 MULTIPLE ALLERGIES: Primary | ICD-10-CM

## 2018-01-02 PROCEDURE — 95117 IMMUNOTHERAPY INJECTIONS: CPT | Performed by: FAMILY MEDICINE

## 2018-01-09 ENCOUNTER — CLINICAL SUPPORT (OUTPATIENT)
Dept: FAMILY MEDICINE CLINIC | Facility: CLINIC | Age: 63
End: 2018-01-09

## 2018-01-09 DIAGNOSIS — Z91.09 ENVIRONMENTAL ALLERGIES: ICD-10-CM

## 2018-01-09 DIAGNOSIS — J30.1 SEASONAL ALLERGIC RHINITIS DUE TO POLLEN, UNSPECIFIED CHRONICITY: ICD-10-CM

## 2018-01-09 PROCEDURE — 95117 IMMUNOTHERAPY INJECTIONS: CPT | Performed by: FAMILY MEDICINE

## 2018-01-15 ENCOUNTER — TELEPHONE (OUTPATIENT)
Dept: FAMILY MEDICINE CLINIC | Facility: CLINIC | Age: 63
End: 2018-01-15

## 2018-01-15 ENCOUNTER — OFFICE VISIT (OUTPATIENT)
Dept: FAMILY MEDICINE CLINIC | Facility: CLINIC | Age: 63
End: 2018-01-15

## 2018-01-15 VITALS
HEART RATE: 75 BPM | OXYGEN SATURATION: 94 % | HEIGHT: 67 IN | TEMPERATURE: 98.1 F | BODY MASS INDEX: 48.81 KG/M2 | SYSTOLIC BLOOD PRESSURE: 150 MMHG | WEIGHT: 311 LBS | DIASTOLIC BLOOD PRESSURE: 80 MMHG

## 2018-01-15 DIAGNOSIS — J01.90 ACUTE SINUSITIS, RECURRENCE NOT SPECIFIED, UNSPECIFIED LOCATION: Primary | ICD-10-CM

## 2018-01-15 PROCEDURE — 99213 OFFICE O/P EST LOW 20 MIN: CPT | Performed by: FAMILY MEDICINE

## 2018-01-15 RX ORDER — AMOXICILLIN 875 MG/1
875 TABLET, COATED ORAL EVERY 12 HOURS SCHEDULED
Qty: 20 TABLET | Refills: 0 | Status: SHIPPED | OUTPATIENT
Start: 2018-01-15 | End: 2018-09-24 | Stop reason: SDUPTHER

## 2018-01-15 NOTE — PROGRESS NOTES
SUBJECTIVE:  The patient is a 62-year-old white male comes in with a week history of facial pain and headache drainage productive cough and congestion.  No fever or chills.     PAST MEDICAL HISTORY:  Reviewed.    REVIEW OF SYSTEMS:  Please see above; 14 point ROS otherwise negative.      OBJECTIVE: Vitals signs are reviewed and are stable.    HEENT: PERRLA.  Tenderness present over the maxillary sinuses.  Throat red.  TMs dull.  Neck:  Supple.    Lungs:  Rare rhonchi right base.  Heart:  Regular rate and rhythm.    Abdomen:   Soft, nontender.    Extremities:  No cyanosis, clubbing or edema.      ASSESSMENT:    Acute bronchitis/sinusitis    PLAN:  Amoxicillin 875 twice a day.  Use Flonase as directed.  Tylenol if needed.  Follow-up in a couple days if no better.  Notify sooner if worse or problems.    Much of this encounter note is an electronic transcription/translation of spoken language to printed text.  The electronic translation of spoken language may permit erroneous, or at times, nonsensical words or phrases to be inadvertently transcribed.  Although I have reviewed the note for such errors, some may still exist.

## 2018-01-30 ENCOUNTER — CLINICAL SUPPORT (OUTPATIENT)
Dept: FAMILY MEDICINE CLINIC | Facility: CLINIC | Age: 63
End: 2018-01-30

## 2018-01-30 DIAGNOSIS — Z88.9 MULTIPLE ALLERGIES: Primary | ICD-10-CM

## 2018-01-30 PROCEDURE — 95117 IMMUNOTHERAPY INJECTIONS: CPT | Performed by: FAMILY MEDICINE

## 2018-02-06 ENCOUNTER — CLINICAL SUPPORT (OUTPATIENT)
Dept: FAMILY MEDICINE CLINIC | Facility: CLINIC | Age: 63
End: 2018-02-06

## 2018-02-06 DIAGNOSIS — J30.2 SEASONAL ALLERGIC RHINITIS, UNSPECIFIED CHRONICITY, UNSPECIFIED TRIGGER: Primary | ICD-10-CM

## 2018-02-06 PROCEDURE — 95117 IMMUNOTHERAPY INJECTIONS: CPT | Performed by: FAMILY MEDICINE

## 2018-02-13 ENCOUNTER — CLINICAL SUPPORT (OUTPATIENT)
Dept: FAMILY MEDICINE CLINIC | Facility: CLINIC | Age: 63
End: 2018-02-13

## 2018-02-13 DIAGNOSIS — J30.2 SEASONAL ALLERGIC RHINITIS, UNSPECIFIED CHRONICITY, UNSPECIFIED TRIGGER: Primary | ICD-10-CM

## 2018-02-13 PROCEDURE — 95117 IMMUNOTHERAPY INJECTIONS: CPT | Performed by: FAMILY MEDICINE

## 2018-02-20 ENCOUNTER — CLINICAL SUPPORT (OUTPATIENT)
Dept: FAMILY MEDICINE CLINIC | Facility: CLINIC | Age: 63
End: 2018-02-20

## 2018-02-20 DIAGNOSIS — J30.1 ACUTE SEASONAL ALLERGIC RHINITIS DUE TO POLLEN: Primary | ICD-10-CM

## 2018-02-20 PROCEDURE — 95117 IMMUNOTHERAPY INJECTIONS: CPT | Performed by: FAMILY MEDICINE

## 2018-02-27 ENCOUNTER — CLINICAL SUPPORT (OUTPATIENT)
Dept: FAMILY MEDICINE CLINIC | Facility: CLINIC | Age: 63
End: 2018-02-27

## 2018-02-27 DIAGNOSIS — J30.2 CHRONIC SEASONAL ALLERGIC RHINITIS, UNSPECIFIED TRIGGER: Primary | ICD-10-CM

## 2018-02-27 PROCEDURE — 95117 IMMUNOTHERAPY INJECTIONS: CPT | Performed by: FAMILY MEDICINE

## 2018-03-14 ENCOUNTER — CLINICAL SUPPORT (OUTPATIENT)
Dept: FAMILY MEDICINE CLINIC | Facility: CLINIC | Age: 63
End: 2018-03-14

## 2018-03-14 DIAGNOSIS — J30.2 SEASONAL ALLERGIC RHINITIS, UNSPECIFIED CHRONICITY, UNSPECIFIED TRIGGER: Primary | ICD-10-CM

## 2018-03-14 PROCEDURE — 95117 IMMUNOTHERAPY INJECTIONS: CPT | Performed by: FAMILY MEDICINE

## 2018-03-21 ENCOUNTER — CLINICAL SUPPORT (OUTPATIENT)
Dept: FAMILY MEDICINE CLINIC | Facility: CLINIC | Age: 63
End: 2018-03-21

## 2018-03-21 DIAGNOSIS — J30.2 SEASONAL ALLERGIC RHINITIS, UNSPECIFIED CHRONICITY, UNSPECIFIED TRIGGER: Primary | ICD-10-CM

## 2018-03-21 PROCEDURE — 95117 IMMUNOTHERAPY INJECTIONS: CPT | Performed by: FAMILY MEDICINE

## 2018-03-28 ENCOUNTER — CLINICAL SUPPORT (OUTPATIENT)
Dept: FAMILY MEDICINE CLINIC | Facility: CLINIC | Age: 63
End: 2018-03-28

## 2018-03-28 DIAGNOSIS — J30.1 ALLERGIC RHINITIS DUE TO POLLEN, UNSPECIFIED CHRONICITY, UNSPECIFIED SEASONALITY: Primary | ICD-10-CM

## 2018-03-28 PROCEDURE — 95117 IMMUNOTHERAPY INJECTIONS: CPT | Performed by: FAMILY MEDICINE

## 2018-04-04 ENCOUNTER — CLINICAL SUPPORT (OUTPATIENT)
Dept: FAMILY MEDICINE CLINIC | Facility: CLINIC | Age: 63
End: 2018-04-04

## 2018-04-04 DIAGNOSIS — J30.1 SEASONAL ALLERGIC RHINITIS DUE TO POLLEN, UNSPECIFIED CHRONICITY: Primary | ICD-10-CM

## 2018-04-04 PROCEDURE — 95117 IMMUNOTHERAPY INJECTIONS: CPT | Performed by: FAMILY MEDICINE

## 2018-04-11 ENCOUNTER — CLINICAL SUPPORT (OUTPATIENT)
Dept: FAMILY MEDICINE CLINIC | Facility: CLINIC | Age: 63
End: 2018-04-11

## 2018-04-11 DIAGNOSIS — J30.9 ALLERGIC RHINITIS, UNSPECIFIED CHRONICITY, UNSPECIFIED SEASONALITY, UNSPECIFIED TRIGGER: Primary | ICD-10-CM

## 2018-04-11 PROCEDURE — 95117 IMMUNOTHERAPY INJECTIONS: CPT | Performed by: FAMILY MEDICINE

## 2018-04-18 ENCOUNTER — CLINICAL SUPPORT (OUTPATIENT)
Dept: FAMILY MEDICINE CLINIC | Facility: CLINIC | Age: 63
End: 2018-04-18

## 2018-04-18 DIAGNOSIS — J30.1 SEASONAL ALLERGIC RHINITIS DUE TO POLLEN, UNSPECIFIED CHRONICITY: Primary | ICD-10-CM

## 2018-04-18 PROCEDURE — 95117 IMMUNOTHERAPY INJECTIONS: CPT | Performed by: FAMILY MEDICINE

## 2018-04-25 ENCOUNTER — CLINICAL SUPPORT (OUTPATIENT)
Dept: FAMILY MEDICINE CLINIC | Facility: CLINIC | Age: 63
End: 2018-04-25

## 2018-04-25 DIAGNOSIS — Z88.9 MULTIPLE ALLERGIES: Primary | ICD-10-CM

## 2018-04-25 PROCEDURE — 95117 IMMUNOTHERAPY INJECTIONS: CPT | Performed by: INTERNAL MEDICINE

## 2018-05-02 ENCOUNTER — CLINICAL SUPPORT (OUTPATIENT)
Dept: FAMILY MEDICINE CLINIC | Facility: CLINIC | Age: 63
End: 2018-05-02

## 2018-05-02 DIAGNOSIS — J30.2 CHRONIC SEASONAL ALLERGIC RHINITIS, UNSPECIFIED TRIGGER: Primary | ICD-10-CM

## 2018-05-02 PROCEDURE — 95117 IMMUNOTHERAPY INJECTIONS: CPT | Performed by: FAMILY MEDICINE

## 2018-05-09 ENCOUNTER — CLINICAL SUPPORT (OUTPATIENT)
Dept: FAMILY MEDICINE CLINIC | Facility: CLINIC | Age: 63
End: 2018-05-09

## 2018-05-09 DIAGNOSIS — J30.9 ALLERGIC RHINITIS, UNSPECIFIED CHRONICITY, UNSPECIFIED SEASONALITY, UNSPECIFIED TRIGGER: Primary | ICD-10-CM

## 2018-05-09 PROCEDURE — 95117 IMMUNOTHERAPY INJECTIONS: CPT | Performed by: FAMILY MEDICINE

## 2018-05-16 ENCOUNTER — CLINICAL SUPPORT (OUTPATIENT)
Dept: FAMILY MEDICINE CLINIC | Facility: CLINIC | Age: 63
End: 2018-05-16

## 2018-05-16 DIAGNOSIS — J30.2 SEASONAL ALLERGIC RHINITIS, UNSPECIFIED CHRONICITY, UNSPECIFIED TRIGGER: Primary | ICD-10-CM

## 2018-05-16 PROCEDURE — 95117 IMMUNOTHERAPY INJECTIONS: CPT | Performed by: FAMILY MEDICINE

## 2018-05-23 ENCOUNTER — CLINICAL SUPPORT (OUTPATIENT)
Dept: FAMILY MEDICINE CLINIC | Facility: CLINIC | Age: 63
End: 2018-05-23

## 2018-05-23 DIAGNOSIS — J30.9 MULTIPLE RESPIRATORY ALLERGIES: ICD-10-CM

## 2018-05-23 DIAGNOSIS — J30.9 ALLERGIC RHINITIS, UNSPECIFIED CHRONICITY, UNSPECIFIED SEASONALITY, UNSPECIFIED TRIGGER: Primary | ICD-10-CM

## 2018-05-23 PROCEDURE — 95117 IMMUNOTHERAPY INJECTIONS: CPT | Performed by: FAMILY MEDICINE

## 2018-05-29 ENCOUNTER — TELEPHONE (OUTPATIENT)
Dept: FAMILY MEDICINE CLINIC | Facility: CLINIC | Age: 63
End: 2018-05-29

## 2018-05-29 RX ORDER — NIFEDIPINE 60 MG/1
60 TABLET, FILM COATED, EXTENDED RELEASE ORAL DAILY
Qty: 90 TABLET | Refills: 3 | Status: SHIPPED | OUTPATIENT
Start: 2018-05-29 | End: 2019-08-14 | Stop reason: SDUPTHER

## 2018-05-29 NOTE — TELEPHONE ENCOUNTER
NEEDS RX FOR NIFEDIPINE ER CALLED IN, MIAN CHANGED IT FROM 30 MG TO 60 MG DAILY. HE WOULD LIKE TO ONLY TAKE 60 MG DAILY INSTEAD OF 30 MG TWICE DAILY.

## 2018-05-30 ENCOUNTER — CLINICAL SUPPORT (OUTPATIENT)
Dept: FAMILY MEDICINE CLINIC | Facility: CLINIC | Age: 63
End: 2018-05-30

## 2018-05-30 DIAGNOSIS — J30.9 ALLERGIC RHINITIS, UNSPECIFIED CHRONICITY, UNSPECIFIED SEASONALITY, UNSPECIFIED TRIGGER: Primary | ICD-10-CM

## 2018-05-30 PROCEDURE — 95117 IMMUNOTHERAPY INJECTIONS: CPT | Performed by: FAMILY MEDICINE

## 2018-06-06 ENCOUNTER — CLINICAL SUPPORT (OUTPATIENT)
Dept: FAMILY MEDICINE CLINIC | Facility: CLINIC | Age: 63
End: 2018-06-06

## 2018-06-06 DIAGNOSIS — J30.2 SEASONAL ALLERGIC RHINITIS, UNSPECIFIED CHRONICITY, UNSPECIFIED TRIGGER: Primary | ICD-10-CM

## 2018-06-06 PROCEDURE — 95117 IMMUNOTHERAPY INJECTIONS: CPT | Performed by: FAMILY MEDICINE

## 2018-06-13 ENCOUNTER — CLINICAL SUPPORT (OUTPATIENT)
Dept: FAMILY MEDICINE CLINIC | Facility: CLINIC | Age: 63
End: 2018-06-13

## 2018-06-13 DIAGNOSIS — Z88.9 MULTIPLE ALLERGIES: Primary | ICD-10-CM

## 2018-06-13 PROCEDURE — 95117 IMMUNOTHERAPY INJECTIONS: CPT | Performed by: INTERNAL MEDICINE

## 2018-06-20 ENCOUNTER — CLINICAL SUPPORT (OUTPATIENT)
Dept: FAMILY MEDICINE CLINIC | Facility: CLINIC | Age: 63
End: 2018-06-20

## 2018-06-20 DIAGNOSIS — Z88.9 MULTIPLE ALLERGIES: Primary | ICD-10-CM

## 2018-06-20 PROCEDURE — 95117 IMMUNOTHERAPY INJECTIONS: CPT | Performed by: FAMILY MEDICINE

## 2018-06-27 ENCOUNTER — CLINICAL SUPPORT (OUTPATIENT)
Dept: FAMILY MEDICINE CLINIC | Facility: CLINIC | Age: 63
End: 2018-06-27

## 2018-06-27 DIAGNOSIS — Z91.09 ENVIRONMENTAL ALLERGIES: Primary | ICD-10-CM

## 2018-06-27 PROCEDURE — 95117 IMMUNOTHERAPY INJECTIONS: CPT | Performed by: FAMILY MEDICINE

## 2018-07-11 ENCOUNTER — CLINICAL SUPPORT (OUTPATIENT)
Dept: FAMILY MEDICINE CLINIC | Facility: CLINIC | Age: 63
End: 2018-07-11

## 2018-07-11 DIAGNOSIS — Z88.9 MULTIPLE ALLERGIES: Primary | ICD-10-CM

## 2018-07-11 PROCEDURE — 95117 IMMUNOTHERAPY INJECTIONS: CPT | Performed by: FAMILY MEDICINE

## 2018-07-18 ENCOUNTER — CLINICAL SUPPORT (OUTPATIENT)
Dept: FAMILY MEDICINE CLINIC | Facility: CLINIC | Age: 63
End: 2018-07-18

## 2018-07-18 DIAGNOSIS — J30.9 ALLERGIC RHINITIS, UNSPECIFIED CHRONICITY, UNSPECIFIED SEASONALITY, UNSPECIFIED TRIGGER: Primary | ICD-10-CM

## 2018-07-18 PROCEDURE — 95117 IMMUNOTHERAPY INJECTIONS: CPT | Performed by: FAMILY MEDICINE

## 2018-07-25 ENCOUNTER — CLINICAL SUPPORT (OUTPATIENT)
Dept: FAMILY MEDICINE CLINIC | Facility: CLINIC | Age: 63
End: 2018-07-25

## 2018-07-25 DIAGNOSIS — Z88.9 MULTIPLE ALLERGIES: Primary | ICD-10-CM

## 2018-07-25 PROCEDURE — 95117 IMMUNOTHERAPY INJECTIONS: CPT | Performed by: FAMILY MEDICINE

## 2018-08-01 ENCOUNTER — CLINICAL SUPPORT (OUTPATIENT)
Dept: FAMILY MEDICINE CLINIC | Facility: CLINIC | Age: 63
End: 2018-08-01

## 2018-08-01 DIAGNOSIS — Z88.9 MULTIPLE ALLERGIES: Primary | ICD-10-CM

## 2018-08-01 PROCEDURE — 95117 IMMUNOTHERAPY INJECTIONS: CPT | Performed by: FAMILY MEDICINE

## 2018-08-08 ENCOUNTER — CLINICAL SUPPORT (OUTPATIENT)
Dept: FAMILY MEDICINE CLINIC | Facility: CLINIC | Age: 63
End: 2018-08-08

## 2018-08-08 DIAGNOSIS — J30.9 ALLERGIC RHINITIS, UNSPECIFIED CHRONICITY, UNSPECIFIED SEASONALITY, UNSPECIFIED TRIGGER: Primary | ICD-10-CM

## 2018-08-08 PROCEDURE — 95117 IMMUNOTHERAPY INJECTIONS: CPT | Performed by: FAMILY MEDICINE

## 2018-08-15 ENCOUNTER — CLINICAL SUPPORT (OUTPATIENT)
Dept: FAMILY MEDICINE CLINIC | Facility: CLINIC | Age: 63
End: 2018-08-15

## 2018-08-15 DIAGNOSIS — J30.2 SEASONAL ALLERGIC RHINITIS, UNSPECIFIED CHRONICITY, UNSPECIFIED TRIGGER: Primary | ICD-10-CM

## 2018-08-15 PROCEDURE — 95117 IMMUNOTHERAPY INJECTIONS: CPT | Performed by: FAMILY MEDICINE

## 2018-08-22 ENCOUNTER — CLINICAL SUPPORT (OUTPATIENT)
Dept: FAMILY MEDICINE CLINIC | Facility: CLINIC | Age: 63
End: 2018-08-22

## 2018-08-22 DIAGNOSIS — Z88.9 MULTIPLE ALLERGIES: Primary | ICD-10-CM

## 2018-08-22 PROCEDURE — 95117 IMMUNOTHERAPY INJECTIONS: CPT | Performed by: FAMILY MEDICINE

## 2018-08-29 ENCOUNTER — CLINICAL SUPPORT (OUTPATIENT)
Dept: FAMILY MEDICINE CLINIC | Facility: CLINIC | Age: 63
End: 2018-08-29

## 2018-08-29 DIAGNOSIS — Z88.9 MULTIPLE ALLERGIES: Primary | ICD-10-CM

## 2018-08-29 PROCEDURE — 95117 IMMUNOTHERAPY INJECTIONS: CPT | Performed by: INTERNAL MEDICINE

## 2018-09-05 ENCOUNTER — CLINICAL SUPPORT (OUTPATIENT)
Dept: FAMILY MEDICINE CLINIC | Facility: CLINIC | Age: 63
End: 2018-09-05

## 2018-09-05 DIAGNOSIS — Z88.9 MULTIPLE ALLERGIES: Primary | ICD-10-CM

## 2018-09-05 PROCEDURE — 95117 IMMUNOTHERAPY INJECTIONS: CPT | Performed by: FAMILY MEDICINE

## 2018-09-12 ENCOUNTER — CLINICAL SUPPORT (OUTPATIENT)
Dept: FAMILY MEDICINE CLINIC | Facility: CLINIC | Age: 63
End: 2018-09-12

## 2018-09-12 DIAGNOSIS — Z88.9 MULTIPLE ALLERGIES: Primary | ICD-10-CM

## 2018-09-12 PROCEDURE — 95117 IMMUNOTHERAPY INJECTIONS: CPT | Performed by: FAMILY MEDICINE

## 2018-09-13 RX ORDER — ALBUTEROL SULFATE 90 UG/1
AEROSOL, METERED RESPIRATORY (INHALATION)
Qty: 1 INHALER | Refills: 5 | Status: SHIPPED | OUTPATIENT
Start: 2018-09-13

## 2018-09-19 ENCOUNTER — CLINICAL SUPPORT (OUTPATIENT)
Dept: FAMILY MEDICINE CLINIC | Facility: CLINIC | Age: 63
End: 2018-09-19

## 2018-09-19 DIAGNOSIS — Z88.9 H/O MULTIPLE ALLERGIES: Primary | ICD-10-CM

## 2018-09-19 PROCEDURE — 95117 IMMUNOTHERAPY INJECTIONS: CPT | Performed by: FAMILY MEDICINE

## 2018-09-24 ENCOUNTER — OFFICE VISIT (OUTPATIENT)
Dept: FAMILY MEDICINE CLINIC | Facility: CLINIC | Age: 63
End: 2018-09-24

## 2018-09-24 VITALS
HEIGHT: 67 IN | HEART RATE: 65 BPM | SYSTOLIC BLOOD PRESSURE: 132 MMHG | BODY MASS INDEX: 47.27 KG/M2 | OXYGEN SATURATION: 97 % | DIASTOLIC BLOOD PRESSURE: 64 MMHG | TEMPERATURE: 98.6 F | WEIGHT: 301.2 LBS

## 2018-09-24 DIAGNOSIS — J45.20 MILD INTERMITTENT ASTHMA WITHOUT COMPLICATION: ICD-10-CM

## 2018-09-24 DIAGNOSIS — J30.2 SEASONAL ALLERGIC RHINITIS, UNSPECIFIED CHRONICITY, UNSPECIFIED TRIGGER: ICD-10-CM

## 2018-09-24 DIAGNOSIS — J06.9 ACUTE URI: ICD-10-CM

## 2018-09-24 DIAGNOSIS — R06.2 WHEEZING: ICD-10-CM

## 2018-09-24 DIAGNOSIS — J01.00 ACUTE MAXILLARY SINUSITIS, RECURRENCE NOT SPECIFIED: Primary | ICD-10-CM

## 2018-09-24 PROCEDURE — 99213 OFFICE O/P EST LOW 20 MIN: CPT | Performed by: NURSE PRACTITIONER

## 2018-09-24 RX ORDER — ALBUTEROL SULFATE 90 UG/1
2 AEROSOL, METERED RESPIRATORY (INHALATION) EVERY 4 HOURS PRN
COMMUNITY
End: 2018-09-24

## 2018-09-24 RX ORDER — METHYLPREDNISOLONE 4 MG/1
TABLET ORAL
Qty: 1 EACH | Refills: 0 | Status: SHIPPED | OUTPATIENT
Start: 2018-09-24 | End: 2019-01-04 | Stop reason: SDUPTHER

## 2018-09-24 RX ORDER — AMOXICILLIN 875 MG/1
875 TABLET, COATED ORAL EVERY 12 HOURS SCHEDULED
Qty: 20 TABLET | Refills: 0 | Status: SHIPPED | OUTPATIENT
Start: 2018-09-24 | End: 2019-01-04 | Stop reason: SDUPTHER

## 2018-09-24 NOTE — PATIENT INSTRUCTIONS
Cont allergy meds and inhaler at home as prescribed.   Amoxicillin 875mg 2x day for 7 days.   Medrol pack take as directed SE explained.   Deferred cxr today.   If symptoms persist 5-7 days call office.   Increase fluid intake, get plenty of rest.   Patient agrees with plan of care and understands instructions. Call if worsening symptoms or any problems or concerns.

## 2018-09-24 NOTE — PROGRESS NOTES
Subjective   Ryley Vazquez is a 63 y.o. male.     History of Present Illness   C/o sinus presusre HA, ear pain,, started about 2 weeks ago, denies fever, was at football game in the rain, he does have drainage, cough, he does have seasonal allergies. Cough is productive  Yellow in color. Denies wheezing. He takes singular, flonase, also uses dulera. He did not try anything at home for this. He did use dulera and albuterol before he came to office today.     The following portions of the patient's history were reviewed and updated as appropriate: allergies, current medications, past family history, past medical history, past social history, past surgical history and problem list.    Review of Systems   Constitutional: Negative for chills, diaphoresis and fever.   HENT: Positive for ear pain, postnasal drip, rhinorrhea, sinus pressure and sore throat. Negative for congestion.    Respiratory: Positive for cough and wheezing. Negative for shortness of breath.    Cardiovascular: Negative for chest pain.   Musculoskeletal: Negative for arthralgias and myalgias.   Skin: Negative for pallor.   Allergic/Immunologic: Positive for environmental allergies.   Neurological: Negative for dizziness, light-headedness and headache.   All other systems reviewed and are negative.      Objective   Physical Exam   Constitutional: He is oriented to person, place, and time. He appears well-developed and well-nourished.   HENT:   Head: Normocephalic.   Right Ear: Tympanic membrane and ear canal normal.   Left Ear: Tympanic membrane and ear canal normal.   Nose: Right sinus exhibits frontal sinus tenderness. Left sinus exhibits frontal sinus tenderness.   Mouth/Throat: Uvula is midline and mucous membranes are normal. Posterior oropharyngeal erythema present.   Eyes: Pupils are equal, round, and reactive to light.   Neck: Normal range of motion.   Cardiovascular: Normal rate, regular rhythm and normal heart sounds.    Pulmonary/Chest:  Effort normal. No respiratory distress. He has no decreased breath sounds. He has wheezes in the right upper field and the left upper field. He has no rhonchi. He has no rales.   Musculoskeletal: Normal range of motion.   Lymphadenopathy:     He has no cervical adenopathy.   Neurological: He is alert and oriented to person, place, and time.   Skin: Skin is warm and dry.   Psychiatric: He has a normal mood and affect. His behavior is normal.   Nursing note and vitals reviewed.        Assessment/Plan   Ryley was seen today for sinus problem.    Diagnoses and all orders for this visit:    Acute maxillary sinusitis, recurrence not specified    Wheezing    Acute URI    Seasonal allergic rhinitis, unspecified chronicity, unspecified trigger    Mild intermittent asthma without complication    Other orders  -     amoxicillin (AMOXIL) 875 MG tablet; Take 1 tablet by mouth Every 12 (Twelve) Hours.  -     MethylPREDNISolone (MEDROL, COREY,) 4 MG tablet; Take as directed on package instructions.      Cont allergy meds and inhaler at home as prescribed.   Amoxicillin 875mg 2x day for 7 days.   Medrol pack take as directed SE explained.   Deferred cxr today.   If symptoms persist 5-7 days call office.   Increase fluid intake, get plenty of rest.   Patient agrees with plan of care and understands instructions. Call if worsening symptoms or any problems or concerns.

## 2018-09-27 RX ORDER — FUROSEMIDE 20 MG/1
TABLET ORAL
Qty: 90 TABLET | Refills: 2 | Status: SHIPPED | OUTPATIENT
Start: 2018-09-27 | End: 2019-08-03 | Stop reason: SDUPTHER

## 2018-10-03 ENCOUNTER — CLINICAL SUPPORT (OUTPATIENT)
Dept: FAMILY MEDICINE CLINIC | Facility: CLINIC | Age: 63
End: 2018-10-03

## 2018-10-03 DIAGNOSIS — J30.2 SEASONAL ALLERGIES: Primary | ICD-10-CM

## 2018-10-03 PROCEDURE — 95117 IMMUNOTHERAPY INJECTIONS: CPT | Performed by: FAMILY MEDICINE

## 2018-10-10 ENCOUNTER — CLINICAL SUPPORT (OUTPATIENT)
Dept: FAMILY MEDICINE CLINIC | Facility: CLINIC | Age: 63
End: 2018-10-10

## 2018-10-10 DIAGNOSIS — J30.89 ENVIRONMENTAL AND SEASONAL ALLERGIES: Primary | ICD-10-CM

## 2018-10-10 PROCEDURE — 95117 IMMUNOTHERAPY INJECTIONS: CPT | Performed by: FAMILY MEDICINE

## 2018-10-17 ENCOUNTER — CLINICAL SUPPORT (OUTPATIENT)
Dept: FAMILY MEDICINE CLINIC | Facility: CLINIC | Age: 63
End: 2018-10-17

## 2018-10-17 DIAGNOSIS — J30.9 ALLERGIC RHINITIS, UNSPECIFIED SEASONALITY, UNSPECIFIED TRIGGER: Primary | ICD-10-CM

## 2018-10-17 PROCEDURE — 95117 IMMUNOTHERAPY INJECTIONS: CPT | Performed by: FAMILY MEDICINE

## 2018-10-24 ENCOUNTER — CLINICAL SUPPORT (OUTPATIENT)
Dept: FAMILY MEDICINE CLINIC | Facility: CLINIC | Age: 63
End: 2018-10-24

## 2018-10-24 DIAGNOSIS — Z91.09 ENVIRONMENTAL ALLERGIES: ICD-10-CM

## 2018-10-24 PROCEDURE — 95117 IMMUNOTHERAPY INJECTIONS: CPT | Performed by: FAMILY MEDICINE

## 2018-10-31 ENCOUNTER — CLINICAL SUPPORT (OUTPATIENT)
Dept: FAMILY MEDICINE CLINIC | Facility: CLINIC | Age: 63
End: 2018-10-31

## 2018-10-31 DIAGNOSIS — J30.2 SEASONAL ALLERGIES: Primary | ICD-10-CM

## 2018-10-31 PROCEDURE — 95117 IMMUNOTHERAPY INJECTIONS: CPT | Performed by: FAMILY MEDICINE

## 2018-11-07 ENCOUNTER — CLINICAL SUPPORT (OUTPATIENT)
Dept: FAMILY MEDICINE CLINIC | Facility: CLINIC | Age: 63
End: 2018-11-07

## 2018-11-07 DIAGNOSIS — J30.1 ALLERGIC RHINITIS DUE TO POLLEN, UNSPECIFIED SEASONALITY: ICD-10-CM

## 2018-11-07 DIAGNOSIS — Z88.9 MULTIPLE ALLERGIES: Primary | ICD-10-CM

## 2018-11-07 PROCEDURE — 95117 IMMUNOTHERAPY INJECTIONS: CPT | Performed by: FAMILY MEDICINE

## 2018-11-16 ENCOUNTER — CLINICAL SUPPORT (OUTPATIENT)
Dept: FAMILY MEDICINE CLINIC | Facility: CLINIC | Age: 63
End: 2018-11-16

## 2018-11-16 PROCEDURE — G0008 ADMIN INFLUENZA VIRUS VAC: HCPCS | Performed by: FAMILY MEDICINE

## 2018-11-16 PROCEDURE — 90674 CCIIV4 VAC NO PRSV 0.5 ML IM: CPT | Performed by: FAMILY MEDICINE

## 2018-11-21 ENCOUNTER — CLINICAL SUPPORT (OUTPATIENT)
Dept: FAMILY MEDICINE CLINIC | Facility: CLINIC | Age: 63
End: 2018-11-21

## 2018-11-21 DIAGNOSIS — J30.9 ALLERGIC RHINITIS, UNSPECIFIED SEASONALITY, UNSPECIFIED TRIGGER: Primary | ICD-10-CM

## 2018-11-21 DIAGNOSIS — Z88.9 MULTIPLE ALLERGIES: ICD-10-CM

## 2018-11-21 PROCEDURE — 95117 IMMUNOTHERAPY INJECTIONS: CPT | Performed by: FAMILY MEDICINE

## 2018-11-28 ENCOUNTER — CLINICAL SUPPORT (OUTPATIENT)
Dept: FAMILY MEDICINE CLINIC | Facility: CLINIC | Age: 63
End: 2018-11-28

## 2018-11-28 DIAGNOSIS — J30.2 SEASONAL ALLERGIES: Primary | ICD-10-CM

## 2018-11-28 PROCEDURE — 95117 IMMUNOTHERAPY INJECTIONS: CPT | Performed by: FAMILY MEDICINE

## 2018-12-07 ENCOUNTER — DOCUMENTATION (OUTPATIENT)
Dept: FAMILY MEDICINE CLINIC | Facility: CLINIC | Age: 63
End: 2018-12-07

## 2018-12-07 NOTE — PROGRESS NOTES
Pt came in for allergy injections. He refused to wait 30 min. after injection, so I did not give him his injection. He took his serum and will be getting them done at allergist.

## 2019-01-01 ENCOUNTER — TELEPHONE (OUTPATIENT)
Dept: FAMILY MEDICINE CLINIC | Facility: CLINIC | Age: 64
End: 2019-01-01

## 2019-01-01 ENCOUNTER — LAB REQUISITION (OUTPATIENT)
Dept: LAB | Facility: HOSPITAL | Age: 64
End: 2019-01-01

## 2019-01-01 ENCOUNTER — LAB (OUTPATIENT)
Dept: FAMILY MEDICINE CLINIC | Facility: CLINIC | Age: 64
End: 2019-01-01

## 2019-01-01 ENCOUNTER — OFFICE VISIT (OUTPATIENT)
Dept: FAMILY MEDICINE CLINIC | Facility: CLINIC | Age: 64
End: 2019-01-01

## 2019-01-01 VITALS
RESPIRATION RATE: 20 BRPM | SYSTOLIC BLOOD PRESSURE: 138 MMHG | TEMPERATURE: 99.6 F | HEIGHT: 67 IN | WEIGHT: 271 LBS | BODY MASS INDEX: 42.53 KG/M2 | DIASTOLIC BLOOD PRESSURE: 70 MMHG | OXYGEN SATURATION: 95 % | HEART RATE: 82 BPM

## 2019-01-01 VITALS
OXYGEN SATURATION: 94 % | HEIGHT: 67 IN | SYSTOLIC BLOOD PRESSURE: 136 MMHG | TEMPERATURE: 98.3 F | HEART RATE: 94 BPM | BODY MASS INDEX: 41.91 KG/M2 | DIASTOLIC BLOOD PRESSURE: 64 MMHG | WEIGHT: 267 LBS

## 2019-01-01 VITALS
OXYGEN SATURATION: 97 % | HEIGHT: 67 IN | SYSTOLIC BLOOD PRESSURE: 122 MMHG | TEMPERATURE: 98.5 F | WEIGHT: 269 LBS | BODY MASS INDEX: 42.22 KG/M2 | HEART RATE: 70 BPM | DIASTOLIC BLOOD PRESSURE: 66 MMHG

## 2019-01-01 DIAGNOSIS — M25.50 POLYARTHRALGIA: Primary | ICD-10-CM

## 2019-01-01 DIAGNOSIS — M05.712 RHEUMATOID ARTHRITIS INVOLVING LEFT SHOULDER WITH POSITIVE RHEUMATOID FACTOR (HCC): Primary | ICD-10-CM

## 2019-01-01 DIAGNOSIS — R31.21 ASYMPTOMATIC MICROSCOPIC HEMATURIA: Primary | ICD-10-CM

## 2019-01-01 DIAGNOSIS — Z87.438 HISTORY OF PROSTATITIS: ICD-10-CM

## 2019-01-01 DIAGNOSIS — Z12.5 ENCOUNTER FOR SCREENING FOR MALIGNANT NEOPLASM OF PROSTATE: ICD-10-CM

## 2019-01-01 DIAGNOSIS — R53.1 WEAKNESS: ICD-10-CM

## 2019-01-01 DIAGNOSIS — R35.0 URINARY FREQUENCY: Primary | ICD-10-CM

## 2019-01-01 DIAGNOSIS — R31.21 ASYMPTOMATIC MICROSCOPIC HEMATURIA: ICD-10-CM

## 2019-01-01 DIAGNOSIS — N30.01 ACUTE CYSTITIS WITH HEMATURIA: Primary | ICD-10-CM

## 2019-01-01 DIAGNOSIS — R30.0 DYSURIA: Primary | ICD-10-CM

## 2019-01-01 DIAGNOSIS — R35.0 URINARY FREQUENCY: ICD-10-CM

## 2019-01-01 DIAGNOSIS — M15.9 OSTEOARTHRITIS OF MULTIPLE JOINTS, UNSPECIFIED OSTEOARTHRITIS TYPE: ICD-10-CM

## 2019-01-01 DIAGNOSIS — N39.0 URINARY TRACT INFECTION, SITE NOT SPECIFIED: ICD-10-CM

## 2019-01-01 DIAGNOSIS — N30.00 ACUTE CYSTITIS WITHOUT HEMATURIA: ICD-10-CM

## 2019-01-01 DIAGNOSIS — M19.90 ARTHRITIS: Primary | ICD-10-CM

## 2019-01-01 DIAGNOSIS — R97.20 ELEVATED PSA: ICD-10-CM

## 2019-01-01 DIAGNOSIS — R35.1 NOCTURIA: ICD-10-CM

## 2019-01-01 DIAGNOSIS — N30.00 ACUTE CYSTITIS WITHOUT HEMATURIA: Primary | ICD-10-CM

## 2019-01-01 DIAGNOSIS — R25.1 TREMOR: ICD-10-CM

## 2019-01-01 DIAGNOSIS — N30.01 ACUTE CYSTITIS WITH HEMATURIA: ICD-10-CM

## 2019-01-01 LAB
ALBUMIN SERPL-MCNC: 4.5 G/DL (ref 3.5–5.2)
ALBUMIN/GLOB SERPL: 1.6 G/DL
ALP SERPL-CCNC: 74 U/L (ref 39–117)
ALT SERPL W P-5'-P-CCNC: 9 U/L (ref 1–41)
ANA SER QL: POSITIVE
ANION GAP SERPL CALCULATED.3IONS-SCNC: 15.4 MMOL/L (ref 5–15)
AST SERPL-CCNC: 16 U/L (ref 1–40)
BACTERIA SPEC AEROBE CULT: ABNORMAL
BACTERIA SPEC AEROBE CULT: ABNORMAL
BACTERIA SPEC AEROBE CULT: NO GROWTH
BACTERIA SPEC AEROBE CULT: NO GROWTH
BACTERIA UR QL AUTO: ABNORMAL /HPF
BACTERIA UR QL AUTO: NORMAL /HPF
BACTERIA UR QL AUTO: NORMAL /HPF
BILIRUB SERPL-MCNC: 1 MG/DL (ref 0.2–1.2)
BILIRUB UR QL STRIP: ABNORMAL
BILIRUB UR QL STRIP: NEGATIVE
BILIRUB UR QL STRIP: NEGATIVE
BUN BLD-MCNC: 14 MG/DL (ref 8–23)
BUN/CREAT SERPL: 22.6 (ref 7–25)
CALCIUM SPEC-SCNC: 9.4 MG/DL (ref 8.6–10.5)
CHLORIDE SERPL-SCNC: 95 MMOL/L (ref 98–107)
CHROMATIN AB SERPL-ACNC: 191.3 IU/ML (ref 0–14)
CLARITY UR: ABNORMAL
CLARITY UR: CLEAR
CLARITY UR: CLEAR
CO2 SERPL-SCNC: 24.6 MMOL/L (ref 22–29)
COLOR UR: YELLOW
CREAT BLD-MCNC: 0.62 MG/DL (ref 0.76–1.27)
CRP SERPL-MCNC: 1.28 MG/DL (ref 0–0.5)
DSDNA AB SER-ACNC: <1 IU/ML (ref 0–9)
ERYTHROCYTE [DISTWIDTH] IN BLOOD BY AUTOMATED COUNT: 15.3 % (ref 12.3–15.4)
ERYTHROCYTE [DISTWIDTH] IN BLOOD BY AUTOMATED COUNT: 15.4 % (ref 12.3–15.4)
ERYTHROCYTE [SEDIMENTATION RATE] IN BLOOD: 3 MM/HR (ref 0–20)
GFR SERPL CREATININE-BSD FRML MDRD: 130 ML/MIN/1.73
GLOBULIN UR ELPH-MCNC: 2.9 GM/DL
GLUCOSE BLD-MCNC: 82 MG/DL (ref 65–99)
GLUCOSE UR STRIP-MCNC: NEGATIVE MG/DL
HCT VFR BLD AUTO: 46.6 % (ref 37.5–51)
HCT VFR BLD AUTO: 46.9 % (ref 37.5–51)
HGB BLD-MCNC: 15.4 G/DL (ref 13–17.7)
HGB BLD-MCNC: 15.8 G/DL (ref 13–17.7)
HGB UR QL STRIP.AUTO: ABNORMAL
HGB UR QL STRIP.AUTO: ABNORMAL
HGB UR QL STRIP.AUTO: NEGATIVE
KETONES UR QL STRIP: ABNORMAL
KETONES UR QL STRIP: NEGATIVE
KETONES UR QL STRIP: NEGATIVE
LEUKOCYTE ESTERASE UR QL STRIP.AUTO: ABNORMAL
LEUKOCYTE ESTERASE UR QL STRIP.AUTO: NEGATIVE
LEUKOCYTE ESTERASE UR QL STRIP.AUTO: NEGATIVE
LYMPHOCYTES # BLD AUTO: 0.8 10*3/MM3 (ref 0.7–3.1)
LYMPHOCYTES # BLD AUTO: 1.7 10*3/MM3 (ref 0.7–3.1)
LYMPHOCYTES NFR BLD AUTO: 26.5 % (ref 19.6–45.3)
LYMPHOCYTES NFR BLD AUTO: 5 % (ref 19.6–45.3)
Lab: NORMAL
MCH RBC QN AUTO: 31.3 PG (ref 26.6–33)
MCH RBC QN AUTO: 32.1 PG (ref 26.6–33)
MCHC RBC AUTO-ENTMCNC: 33.1 G/DL (ref 31.5–35.7)
MCHC RBC AUTO-ENTMCNC: 33.7 G/DL (ref 31.5–35.7)
MCV RBC AUTO: 94.5 FL (ref 79–97)
MCV RBC AUTO: 95.3 FL (ref 79–97)
MONOCYTES # BLD AUTO: 0.2 10*3/MM3 (ref 0.1–0.9)
MONOCYTES # BLD AUTO: 0.4 10*3/MM3 (ref 0.1–0.9)
MONOCYTES NFR BLD AUTO: 1.4 % (ref 5–12)
MONOCYTES NFR BLD AUTO: 5.6 % (ref 5–12)
NEUTROPHILS # BLD AUTO: 15.6 10*3/MM3 (ref 1.7–7)
NEUTROPHILS # BLD AUTO: 4.5 10*3/MM3 (ref 1.7–7)
NEUTROPHILS NFR BLD AUTO: 67.9 % (ref 42.7–76)
NEUTROPHILS NFR BLD AUTO: 93.6 % (ref 42.7–76)
NITRITE UR QL STRIP: NEGATIVE
NITRITE UR QL STRIP: NEGATIVE
NITRITE UR QL STRIP: POSITIVE
PH UR STRIP.AUTO: 5.5 [PH] (ref 4.6–8)
PH UR STRIP.AUTO: 5.5 [PH] (ref 4.6–8)
PH UR STRIP.AUTO: 6.5 [PH] (ref 4.6–8)
PLATELET # BLD AUTO: 202 10*3/MM3 (ref 140–450)
PLATELET # BLD AUTO: 226 10*3/MM3 (ref 140–450)
PMV BLD AUTO: 7.1 FL (ref 6–12)
PMV BLD AUTO: 8.1 FL (ref 6–12)
POTASSIUM BLD-SCNC: 4.3 MMOL/L (ref 3.5–5.2)
PROT SERPL-MCNC: 7.4 G/DL (ref 6–8.5)
PROT UR QL STRIP: ABNORMAL
PROT UR QL STRIP: NEGATIVE
PROT UR QL STRIP: NEGATIVE
PSA SERPL-MCNC: 2.17 NG/ML (ref 0–4)
PSA SERPL-MCNC: 5.73 NG/ML (ref 0–4)
RBC # BLD AUTO: 4.93 10*6/MM3 (ref 4.14–5.8)
RBC # BLD AUTO: 4.93 10*6/MM3 (ref 4.14–5.8)
RBC # UR: ABNORMAL /HPF
RBC # UR: NORMAL /HPF
RBC # UR: NORMAL /HPF
REF LAB TEST METHOD: ABNORMAL
REF LAB TEST METHOD: NORMAL
REF LAB TEST METHOD: NORMAL
SODIUM BLD-SCNC: 135 MMOL/L (ref 136–145)
SP GR UR STRIP: 1.01 (ref 1–1.03)
SP GR UR STRIP: 1.02 (ref 1–1.03)
SP GR UR STRIP: >=1.03 (ref 1–1.03)
SQUAMOUS #/AREA URNS HPF: ABNORMAL /HPF
SQUAMOUS #/AREA URNS HPF: NORMAL /HPF
SQUAMOUS #/AREA URNS HPF: NORMAL /HPF
UROBILINOGEN UR QL STRIP: ABNORMAL
UROBILINOGEN UR QL STRIP: ABNORMAL
UROBILINOGEN UR QL STRIP: NORMAL
WBC NRBC COR # BLD: 16.7 10*3/MM3 (ref 3.4–10.8)
WBC NRBC COR # BLD: 6.6 10*3/MM3 (ref 3.4–10.8)
WBC UR QL AUTO: ABNORMAL /HPF
WBC UR QL AUTO: NORMAL /HPF
WBC UR QL AUTO: NORMAL /HPF

## 2019-01-01 PROCEDURE — 80053 COMPREHEN METABOLIC PANEL: CPT | Performed by: FAMILY MEDICINE

## 2019-01-01 PROCEDURE — 81001 URINALYSIS AUTO W/SCOPE: CPT | Performed by: NURSE PRACTITIONER

## 2019-01-01 PROCEDURE — 85652 RBC SED RATE AUTOMATED: CPT | Performed by: FAMILY MEDICINE

## 2019-01-01 PROCEDURE — 36415 COLL VENOUS BLD VENIPUNCTURE: CPT | Performed by: FAMILY MEDICINE

## 2019-01-01 PROCEDURE — 87086 URINE CULTURE/COLONY COUNT: CPT | Performed by: NURSE PRACTITIONER

## 2019-01-01 PROCEDURE — 36415 COLL VENOUS BLD VENIPUNCTURE: CPT | Performed by: NURSE PRACTITIONER

## 2019-01-01 PROCEDURE — G0103 PSA SCREENING: HCPCS | Performed by: FAMILY MEDICINE

## 2019-01-01 PROCEDURE — 87086 URINE CULTURE/COLONY COUNT: CPT | Performed by: UROLOGY

## 2019-01-01 PROCEDURE — 99213 OFFICE O/P EST LOW 20 MIN: CPT | Performed by: FAMILY MEDICINE

## 2019-01-01 PROCEDURE — 87076 CULTURE ANAEROBE IDENT EACH: CPT | Performed by: FAMILY MEDICINE

## 2019-01-01 PROCEDURE — 87086 URINE CULTURE/COLONY COUNT: CPT | Performed by: FAMILY MEDICINE

## 2019-01-01 PROCEDURE — 87088 URINE BACTERIA CULTURE: CPT | Performed by: NURSE PRACTITIONER

## 2019-01-01 PROCEDURE — 99213 OFFICE O/P EST LOW 20 MIN: CPT | Performed by: NURSE PRACTITIONER

## 2019-01-01 PROCEDURE — 86140 C-REACTIVE PROTEIN: CPT | Performed by: FAMILY MEDICINE

## 2019-01-01 PROCEDURE — 84153 ASSAY OF PSA TOTAL: CPT | Performed by: NURSE PRACTITIONER

## 2019-01-01 PROCEDURE — 85025 COMPLETE CBC W/AUTO DIFF WBC: CPT | Performed by: FAMILY MEDICINE

## 2019-01-01 PROCEDURE — 86431 RHEUMATOID FACTOR QUANT: CPT | Performed by: FAMILY MEDICINE

## 2019-01-01 PROCEDURE — 81001 URINALYSIS AUTO W/SCOPE: CPT | Performed by: FAMILY MEDICINE

## 2019-01-01 PROCEDURE — 87186 SC STD MICRODIL/AGAR DIL: CPT | Performed by: NURSE PRACTITIONER

## 2019-01-01 RX ORDER — DOXYCYCLINE HYCLATE 100 MG
100 TABLET ORAL 2 TIMES DAILY
Qty: 14 TABLET | Refills: 0 | Status: SHIPPED | OUTPATIENT
Start: 2019-01-01 | End: 2019-01-01

## 2019-01-01 RX ORDER — SULFAMETHOXAZOLE AND TRIMETHOPRIM 400; 80 MG/1; MG/1
1 TABLET ORAL 2 TIMES DAILY
Qty: 14 TABLET | Refills: 0 | Status: SHIPPED | OUTPATIENT
Start: 2019-01-01 | End: 2019-01-01

## 2019-01-01 RX ORDER — LEVOFLOXACIN 500 MG/1
500 TABLET, FILM COATED ORAL DAILY
Qty: 10 TABLET | Refills: 0 | Status: SHIPPED | OUTPATIENT
Start: 2019-01-01 | End: 2019-01-01

## 2019-01-01 RX ORDER — FUROSEMIDE 20 MG/1
TABLET ORAL
Qty: 90 TABLET | Refills: 0 | Status: SHIPPED | OUTPATIENT
Start: 2019-01-01 | End: 2020-01-01

## 2019-01-01 RX ORDER — NIFEDIPINE 60 MG/1
TABLET, EXTENDED RELEASE ORAL
Qty: 90 TABLET | Refills: 0 | Status: SHIPPED | OUTPATIENT
Start: 2019-01-01 | End: 2020-01-01

## 2019-01-01 RX ORDER — DOXYCYCLINE HYCLATE 100 MG
100 TABLET ORAL 2 TIMES DAILY
Qty: 20 TABLET | Refills: 0 | Status: SHIPPED | OUTPATIENT
Start: 2019-01-01 | End: 2019-01-01

## 2019-01-01 RX ORDER — TAMSULOSIN HYDROCHLORIDE 0.4 MG/1
CAPSULE ORAL
COMMUNITY
Start: 2019-01-01 | End: 2020-01-01

## 2019-01-01 RX ORDER — FLUNISOLIDE 0.25 MG/ML
1 SOLUTION NASAL EVERY 12 HOURS
Refills: 10 | COMMUNITY
Start: 2019-01-01

## 2019-01-04 ENCOUNTER — OFFICE VISIT (OUTPATIENT)
Dept: FAMILY MEDICINE CLINIC | Facility: CLINIC | Age: 64
End: 2019-01-04

## 2019-01-04 VITALS
HEART RATE: 94 BPM | TEMPERATURE: 98.8 F | SYSTOLIC BLOOD PRESSURE: 120 MMHG | OXYGEN SATURATION: 96 % | BODY MASS INDEX: 45.04 KG/M2 | DIASTOLIC BLOOD PRESSURE: 66 MMHG | WEIGHT: 287 LBS | HEIGHT: 67 IN

## 2019-01-04 DIAGNOSIS — J45.41 MODERATE PERSISTENT ASTHMATIC BRONCHITIS WITH ACUTE EXACERBATION: Primary | ICD-10-CM

## 2019-01-04 DIAGNOSIS — J01.00 ACUTE MAXILLARY SINUSITIS, RECURRENCE NOT SPECIFIED: ICD-10-CM

## 2019-01-04 DIAGNOSIS — J30.2 SEASONAL ALLERGIC RHINITIS, UNSPECIFIED TRIGGER: ICD-10-CM

## 2019-01-04 PROCEDURE — 99213 OFFICE O/P EST LOW 20 MIN: CPT | Performed by: NURSE PRACTITIONER

## 2019-01-04 RX ORDER — BENZONATATE 100 MG/1
100 CAPSULE ORAL 3 TIMES DAILY PRN
Qty: 30 CAPSULE | Refills: 0 | Status: SHIPPED | OUTPATIENT
Start: 2019-01-04 | End: 2019-08-14

## 2019-01-04 RX ORDER — METHYLPREDNISOLONE 4 MG/1
TABLET ORAL
Qty: 1 EACH | Refills: 0 | Status: SHIPPED | OUTPATIENT
Start: 2019-01-04 | End: 2019-08-14

## 2019-01-04 RX ORDER — AMOXICILLIN 875 MG/1
875 TABLET, COATED ORAL EVERY 12 HOURS SCHEDULED
Qty: 20 TABLET | Refills: 0 | Status: SHIPPED | OUTPATIENT
Start: 2019-01-04 | End: 2019-08-14 | Stop reason: SDUPTHER

## 2019-01-04 NOTE — PATIENT INSTRUCTIONS
erx amox 875 mg BID x 10 days, erx medrol dose pack use as directed, increase H20 and rest, cont singulair, flonase, astelin and dulera, cont ventolin prn, cont FU with allergist, increase H20 and rest, trial tessalon perles 100 mg TID prn cough, call or RTC if sx persist or worsen

## 2019-01-04 NOTE — PROGRESS NOTES
Subjective   Ryley Vazquez is a 63 y.o. male.     History of Present Illness   C/o sinus congestion and tenderness x 2 wks, with B ear pain, sore throat, HA, prod cough with back pain, with SOA and wheezing, no fevers or diarrhea but some NV attributes to PND and congestion, NKDA, with chronic allergies and asthma on immunotherapy through allergist Dr Hoskins, on flonase, singulair 10 mg, ventolin increased usage 3 times daily usually not much when not sick, dulera daily, Last tx sinusitis 09/18 amox 875 mg and medrol dose pack and tolerated    The following portions of the patient's history were reviewed and updated as appropriate: allergies, current medications, past family history, past medical history, past social history, past surgical history and problem list.    Review of Systems   Constitutional: Positive for fatigue. Negative for fever.   HENT: Positive for congestion, ear pain, facial swelling, postnasal drip, sinus pressure, sinus pain and sore throat. Negative for dental problem, drooling, ear discharge, hearing loss, mouth sores, nosebleeds, rhinorrhea, sneezing, tinnitus, trouble swallowing and voice change.    Eyes: Negative for visual disturbance.   Respiratory: Positive for cough, chest tightness, shortness of breath and wheezing. Negative for apnea, choking and stridor.    Cardiovascular: Negative for chest pain, palpitations and leg swelling.   Gastrointestinal: Positive for nausea and vomiting. Negative for abdominal distention, abdominal pain, anal bleeding, blood in stool, constipation, diarrhea and rectal pain.   Musculoskeletal: Positive for arthralgias and back pain.   Allergic/Immunologic: Positive for environmental allergies.   Neurological: Positive for headaches. Negative for dizziness, tremors, seizures, syncope, facial asymmetry, speech difficulty, weakness, light-headedness and numbness.   All other systems reviewed and are negative.      Objective   Physical Exam   Constitutional: He  is oriented to person, place, and time. He appears well-developed and well-nourished.   HENT:   Head: Normocephalic and atraumatic.   Right Ear: Hearing normal. Tympanic membrane is erythematous. A middle ear effusion is present.   Left Ear: Hearing normal. Tympanic membrane is erythematous. A middle ear effusion is present.   Nose: Mucosal edema present. Right sinus exhibits maxillary sinus tenderness and frontal sinus tenderness. Left sinus exhibits maxillary sinus tenderness and frontal sinus tenderness.   Mouth/Throat: Posterior oropharyngeal erythema present. No oropharyngeal exudate or posterior oropharyngeal edema.   Eyes: Conjunctivae and EOM are normal. Pupils are equal, round, and reactive to light.   Neck: Normal range of motion. Neck supple. No thyromegaly present.   Cardiovascular: Normal rate, regular rhythm and normal heart sounds.   Pulmonary/Chest: Effort normal. He has wheezes (B lower lungs and right middle and upper).   Musculoskeletal: Normal range of motion.   Lymphadenopathy:     He has cervical adenopathy.   Neurological: He is alert and oriented to person, place, and time.   Skin: Skin is warm and dry.   Psychiatric: He has a normal mood and affect. His behavior is normal. Judgment and thought content normal.   Vitals reviewed.      Assessment/Plan   Ryley was seen today for cough, nasal congestion, uri, earache, sore throat and headache.    Diagnoses and all orders for this visit:    Moderate persistent asthmatic bronchitis with acute exacerbation    Acute maxillary sinusitis, recurrence not specified    Seasonal allergic rhinitis, unspecified trigger    Other orders  -     MethylPREDNISolone (MEDROL, COREY,) 4 MG tablet; Take as directed on package instructions.  -     amoxicillin (AMOXIL) 875 MG tablet; Take 1 tablet by mouth Every 12 (Twelve) Hours.  -     benzonatate (TESSALON PERLES) 100 MG capsule; Take 1 capsule by mouth 3 (Three) Times a Day As Needed for Cough.    erx amox 875 mg  BID x 10 days, erx medrol dose pack use as directed, increase H20 and rest, cont singulair, flonase, astelin and dulera, cont ventolin prn, cont FU with allergist, increase H20 and rest, trial tessalon perles 100 mg TID prn cough, call or RTC if sx persist or worsen

## 2019-05-19 RX ORDER — NIFEDIPINE 60 MG/1
TABLET, EXTENDED RELEASE ORAL
Qty: 90 TABLET | Refills: 0 | Status: SHIPPED | OUTPATIENT
Start: 2019-05-19 | End: 2019-08-17 | Stop reason: SDUPTHER

## 2019-07-08 PROBLEM — M15.9 OSTEOARTHRITIS OF MULTIPLE JOINTS: Status: ACTIVE | Noted: 2019-07-08

## 2019-07-10 ENCOUNTER — TELEPHONE (OUTPATIENT)
Dept: FAMILY MEDICINE CLINIC | Facility: CLINIC | Age: 64
End: 2019-07-10

## 2019-07-10 ENCOUNTER — OFFICE VISIT (OUTPATIENT)
Dept: FAMILY MEDICINE CLINIC | Facility: CLINIC | Age: 64
End: 2019-07-10

## 2019-07-10 ENCOUNTER — CLINICAL SUPPORT (OUTPATIENT)
Dept: FAMILY MEDICINE CLINIC | Facility: CLINIC | Age: 64
End: 2019-07-10

## 2019-07-10 VITALS
TEMPERATURE: 98.8 F | HEART RATE: 52 BPM | WEIGHT: 283 LBS | HEIGHT: 67 IN | BODY MASS INDEX: 44.42 KG/M2 | SYSTOLIC BLOOD PRESSURE: 152 MMHG | OXYGEN SATURATION: 97 % | DIASTOLIC BLOOD PRESSURE: 80 MMHG

## 2019-07-10 VITALS — SYSTOLIC BLOOD PRESSURE: 140 MMHG | DIASTOLIC BLOOD PRESSURE: 73 MMHG

## 2019-07-10 DIAGNOSIS — Z12.5 SCREENING FOR PROSTATE CANCER: ICD-10-CM

## 2019-07-10 DIAGNOSIS — Z00.00 MEDICARE ANNUAL WELLNESS VISIT, SUBSEQUENT: Primary | ICD-10-CM

## 2019-07-10 DIAGNOSIS — M25.50 POLYARTHRALGIA: ICD-10-CM

## 2019-07-10 DIAGNOSIS — I10 ESSENTIAL HYPERTENSION: ICD-10-CM

## 2019-07-10 DIAGNOSIS — Z87.09 HISTORY OF ASTHMA: ICD-10-CM

## 2019-07-10 DIAGNOSIS — M05.80 POLYARTHRITIS WITH POSITIVE RHEUMATOID FACTOR (HCC): ICD-10-CM

## 2019-07-10 DIAGNOSIS — M15.9 PRIMARY OSTEOARTHRITIS INVOLVING MULTIPLE JOINTS: ICD-10-CM

## 2019-07-10 LAB
ALBUMIN SERPL-MCNC: 4.6 G/DL (ref 3.5–5.2)
ALBUMIN/GLOB SERPL: 1.6 G/DL
ALP SERPL-CCNC: 79 U/L (ref 39–117)
ALT SERPL W P-5'-P-CCNC: 11 U/L (ref 1–41)
ANION GAP SERPL CALCULATED.3IONS-SCNC: 10.8 MMOL/L (ref 5–15)
AST SERPL-CCNC: 16 U/L (ref 1–40)
BACTERIA UR QL AUTO: NORMAL /HPF
BILIRUB SERPL-MCNC: 0.7 MG/DL (ref 0.2–1.2)
BILIRUB UR QL STRIP: NEGATIVE
BUN BLD-MCNC: 12 MG/DL (ref 8–23)
BUN/CREAT SERPL: 17.9 (ref 7–25)
CALCIUM SPEC-SCNC: 9.4 MG/DL (ref 8.6–10.5)
CHLORIDE SERPL-SCNC: 101 MMOL/L (ref 98–107)
CHOLEST SERPL-MCNC: 112 MG/DL (ref 0–200)
CLARITY UR: CLEAR
CO2 SERPL-SCNC: 29.2 MMOL/L (ref 22–29)
COLOR UR: YELLOW
CREAT BLD-MCNC: 0.67 MG/DL (ref 0.76–1.27)
DEVELOPER EXPIRATION DATE: NORMAL
DEVELOPER LOT NUMBER: NORMAL
ERYTHROCYTE [DISTWIDTH] IN BLOOD BY AUTOMATED COUNT: 15.8 % (ref 12.3–15.4)
EXPIRATION DATE: NORMAL
FECAL OCCULT BLOOD SCREEN, POC: NEGATIVE
GFR SERPL CREATININE-BSD FRML MDRD: 119 ML/MIN/1.73
GLOBULIN UR ELPH-MCNC: 2.9 GM/DL
GLUCOSE BLD-MCNC: 93 MG/DL (ref 65–99)
GLUCOSE UR STRIP-MCNC: NEGATIVE MG/DL
HCT VFR BLD AUTO: 46.3 % (ref 37.5–51)
HDLC SERPL-MCNC: 48 MG/DL (ref 40–60)
HGB BLD-MCNC: 15.6 G/DL (ref 13–17.7)
HGB UR QL STRIP.AUTO: NEGATIVE
KETONES UR QL STRIP: NEGATIVE
LDLC SERPL CALC-MCNC: 50 MG/DL (ref 0–100)
LDLC/HDLC SERPL: 1.05 {RATIO}
LEUKOCYTE ESTERASE UR QL STRIP.AUTO: NEGATIVE
LYMPHOCYTES # BLD AUTO: 1.4 10*3/MM3 (ref 0.7–3.1)
LYMPHOCYTES NFR BLD AUTO: 21.7 % (ref 19.6–45.3)
Lab: NORMAL
MCH RBC QN AUTO: 31.2 PG (ref 26.6–33)
MCHC RBC AUTO-ENTMCNC: 33.7 G/DL (ref 31.5–35.7)
MCV RBC AUTO: 92.6 FL (ref 79–97)
MONOCYTES # BLD AUTO: 0.4 10*3/MM3 (ref 0.1–0.9)
MONOCYTES NFR BLD AUTO: 5.4 % (ref 5–12)
NEGATIVE CONTROL: NEGATIVE
NEUTROPHILS # BLD AUTO: 4.8 10*3/MM3 (ref 1.7–7)
NEUTROPHILS NFR BLD AUTO: 72.9 % (ref 42.7–76)
NITRITE UR QL STRIP: NEGATIVE
PH UR STRIP.AUTO: 6 [PH] (ref 4.6–8)
PLATELET # BLD AUTO: 176 10*3/MM3 (ref 140–450)
PMV BLD AUTO: 8.4 FL (ref 6–12)
POSITIVE CONTROL: POSITIVE
POTASSIUM BLD-SCNC: 4.3 MMOL/L (ref 3.5–5.2)
PROT SERPL-MCNC: 7.5 G/DL (ref 6–8.5)
PROT UR QL STRIP: NEGATIVE
PSA SERPL-MCNC: 1.92 NG/ML (ref 0–4)
RBC # BLD AUTO: 5 10*6/MM3 (ref 4.14–5.8)
RBC # UR: NORMAL /HPF
REF LAB TEST METHOD: NORMAL
SODIUM BLD-SCNC: 141 MMOL/L (ref 136–145)
SP GR UR STRIP: 1.01 (ref 1–1.03)
SQUAMOUS #/AREA URNS HPF: NORMAL /HPF
TRIGL SERPL-MCNC: 69 MG/DL (ref 0–150)
UROBILINOGEN UR QL STRIP: NORMAL
VLDLC SERPL-MCNC: 13.8 MG/DL (ref 5–40)
WBC NRBC COR # BLD: 6.6 10*3/MM3 (ref 3.4–10.8)
WBC UR QL AUTO: NORMAL /HPF

## 2019-07-10 PROCEDURE — 81001 URINALYSIS AUTO W/SCOPE: CPT | Performed by: FAMILY MEDICINE

## 2019-07-10 PROCEDURE — 85025 COMPLETE CBC W/AUTO DIFF WBC: CPT | Performed by: FAMILY MEDICINE

## 2019-07-10 PROCEDURE — 80053 COMPREHEN METABOLIC PANEL: CPT | Performed by: FAMILY MEDICINE

## 2019-07-10 PROCEDURE — G0103 PSA SCREENING: HCPCS | Performed by: FAMILY MEDICINE

## 2019-07-10 PROCEDURE — 36415 COLL VENOUS BLD VENIPUNCTURE: CPT | Performed by: FAMILY MEDICINE

## 2019-07-10 PROCEDURE — 99213 OFFICE O/P EST LOW 20 MIN: CPT | Performed by: FAMILY MEDICINE

## 2019-07-10 PROCEDURE — 80061 LIPID PANEL: CPT | Performed by: FAMILY MEDICINE

## 2019-07-10 PROCEDURE — G0439 PPPS, SUBSEQ VISIT: HCPCS | Performed by: FAMILY MEDICINE

## 2019-07-10 RX ORDER — METHYLPREDNISOLONE 4 MG/1
TABLET ORAL
Qty: 21 TABLET | Refills: 0 | Status: SHIPPED | OUTPATIENT
Start: 2019-07-10 | End: 2019-08-14

## 2019-07-10 NOTE — TELEPHONE ENCOUNTER
Pt has a pres for medrol at the pharm to  that he didn't know anything about. Wants to know what its for??

## 2019-07-10 NOTE — PATIENT INSTRUCTIONS
Medicare Wellness  Personal Prevention Plan of Service     Date of Office Visit:  07/10/2019  Encounter Provider:  Braulio Sorto MD  Place of Service:  [facility]  Patient Name: Ryley Vazquez  :  1955    As part of the Medicare Wellness portion of your visit today, we are providing you with this personalized preventive plan of services (PPPS). This plan is based upon recommendations of the [place] Preventive Services Task Force (USPSTF) and the Advisory Committee on Immunization Practices (ACIP).    This lists the preventive care services that should be considered, and provides dates of when you are due. Items listed as completed are up-to-date and do not require any further intervention.    Health Maintenance   Topic Date Due   • TDAP/TD VACCINES (1 - Tdap) 1973   • ZOSTER VACCINE (1 of 2) 2004   • HEPATITIS C SCREENING  03/15/2016   • PNEUMOCOCCAL VACCINES (65+ LOW/MEDIUM RISK) (1 of 2 - PCV13) 2019   • AAA SCREEN (ONE-TIME)  2019   • INFLUENZA VACCINE  2019   • MEDICARE ANNUAL WELLNESS  07/10/2020   • COLONOSCOPY  04/15/2025       Orders Placed This Encounter   Procedures   • Comprehensive Metabolic Panel   • Lipid Panel   • CBC & Differential     Order Specific Question:   Manual Differential     Answer:   No   • Urinalysis With Microscopic - Urine, Clean Catch       No Follow-up on file.

## 2019-07-10 NOTE — PROGRESS NOTES
QUICK REFERENCE INFORMATION:  The ABCs of the Annual Wellness Visit    Subsequent Medicare Wellness Visit     HEALTH RISK ASSESSMENT    : 1955    Recent Hospitalizations:  No hospitalization(s) within the last year..  ccc      Current Medical Providers:  Patient Care Team:  Braulio Sorto MD as PCP - General  Erik Mclaughlin MD as Consulting Physician (Allergy and Immunology)        Smoking Status:  Social History     Tobacco Use   Smoking Status Current Every Day Smoker   • Packs/day: 1.00   • Years: 14.00   • Pack years: 14.00   Smokeless Tobacco Never Used       Alcohol Consumption:  Social History     Substance and Sexual Activity   Alcohol Use Yes    Comment: hx:alcohol abuse/ occasional       Depression Screen:   PHQ-2/PHQ-9 Depression Screening 7/10/2019   Little interest or pleasure in doing things 0   Feeling down, depressed, or hopeless 0   Trouble falling or staying asleep, or sleeping too much 3   Feeling tired or having little energy 0   Poor appetite or overeating 0   Feeling bad about yourself - or that you are a failure or have let yourself or your family down 0   Trouble concentrating on things, such as reading the newspaper or watching television 0   Moving or speaking so slowly that other people could have noticed. Or the opposite - being so fidgety or restless that you have been moving around a lot more than usual 0   Thoughts that you would be better off dead, or of hurting yourself in some way 0   Total Score 3   If you checked off any problems, how difficult have these problems made it for you to do your work, take care of things at home, or get along with other people? Not difficult at all       Health Habits and Functional and Cognitive Screening:  Functional & Cognitive Status 7/10/2019   Do you have difficulty preparing food and eating? No   Do you have difficulty bathing yourself, getting dressed or grooming yourself? No   Do you have difficulty using the toilet? No   Do you have  difficulty moving around from place to place? No   Do you have trouble with steps or getting out of a bed or a chair? No   In the past year have you fallen or experienced a near fall? Yes   Current Diet Unhealthy Diet   Dental Exam Up to date   Eye Exam Up to date   Exercise (times per week) 7 times per week   Current Exercise Activities Include Walking   Do you need help using the phone?  No   Are you deaf or do you have serious difficulty hearing?  No   Do you need help with transportation? No   Do you need help shopping? No   Do you need help preparing meals?  No   Do you need help with housework?  No   Do you need help with laundry? No   Do you need help taking your medications? No   Do you need help managing money? No   Do you ever drive or ride in a car without wearing a seat belt? Yes   Have you felt unusual stress, anger or loneliness in the last month? No   Who do you live with? Spouse   If you need help, do you have trouble finding someone available to you? No   Do you have difficulty concentrating, remembering or making decisions? No           Does the patient have evidence of cognitive impairment? No    Asiprin use counseling: Does not need ASA (and currently is not on it)      Recent Lab Results:          Lab Results   Component Value Date    CHOL 137 04/18/2017    TRIG 72 04/18/2017    HDL 60 04/18/2017    VLDL 14.4 04/18/2017    LDLHDL 1.04 04/18/2017           Age-appropriate Screening Schedule:  Refer to the list below for future screening recommendations based on patient's age, sex and/or medical conditions. Orders for these recommended tests are listed in the plan section. The patient has been provided with a written plan.    Health Maintenance   Topic Date Due   • TDAP/TD VACCINES (1 - Tdap) 02/08/1973   • ZOSTER VACCINE (1 of 2) 02/08/2004   • PNEUMOCOCCAL VACCINES (65+ LOW/MEDIUM RISK) (1 of 2 - PCV13) 02/08/2019   • INFLUENZA VACCINE  08/01/2019   • COLONOSCOPY  04/15/2025        Subjective    History of Present Illness    Ryley Vazquez is a 64 y.o. male who presents for an Annual Wellness Visit.    The following portions of the patient's history were reviewed and updated as appropriate: past family history and past social history.    Outpatient Medications Prior to Visit   Medication Sig Dispense Refill   • fluticasone (FLONASE) 50 MCG/ACT nasal spray 2 sprays into each nostril Daily.     • furosemide (LASIX) 20 MG tablet TAKE ONE TABLET BY MOUTH DAILY 90 tablet 2   • mometasone-formoterol (DULERA 200) 200-5 MCG/ACT inhaler Inhale 2 puffs 2 (Two) Times a Day.     • montelukast (SINGULAIR) 10 MG tablet Take 10 mg by mouth Every Night.     • NIFEdipine CC (ADALAT CC) 60 MG 24 hr tablet Take 1 tablet by mouth Daily. 90 tablet 3   • VENTOLIN  (90 Base) MCG/ACT inhaler INHALE TWO PUFFS BY MOUTH EVERY 4 HOURS AS NEEDED FOR WHEEZING 1 inhaler 5   • amoxicillin (AMOXIL) 875 MG tablet Take 1 tablet by mouth Every 12 (Twelve) Hours. 20 tablet 0   • benzonatate (TESSALON PERLES) 100 MG capsule Take 1 capsule by mouth 3 (Three) Times a Day As Needed for Cough. 30 capsule 0   • MethylPREDNISolone (MEDROL, COREY,) 4 MG tablet Take as directed on package instructions. 1 each 0   • NIFEdipine XL (PROCARDIA XL) 60 MG 24 hr tablet TAKE 1 TABLET BY MOUTH EVERY DAY 90 tablet 0   • traZODone (DESYREL) 100 MG tablet Take 1 tablet by mouth At Night As Needed for Sleep. 30 tablet 5     No facility-administered medications prior to visit.        Patient Active Problem List   Diagnosis   • Sleep apnea   • Sinusitis   • Hypertension   • Asthmatic bronchitis   • Arthritis   • Osteoarthritis of multiple joints       Advance Care Planning:  Patient does not have an advance directive - not interested in additional information    Identification of Risk Factors:  Risk factors include: Cardiovascular risk  Chronic Pain .    Review of Systems    Compared to one year ago, the patient feels his physical health is worse.  Compared to  "one year ago, the patient feels his mental health is the same.    Objective     Physical Exam    Vitals:    07/10/19 1037   BP: 152/80   BP Location: Right arm   Patient Position: Sitting   Cuff Size: Adult   Pulse: 52   Temp: 98.8 °F (37.1 °C)   TempSrc: Oral   SpO2: 97%   Weight: 128 kg (283 lb)   Height: 170.2 cm (67.01\")   PainSc: 0-No pain       Patient's Body mass index is 44.31 kg/m². BMI is above normal parameters. Recommendations include: none- pt is losing weight.      Assessment/Plan   Patient Self-Management and Personalized Health Advice  The patient has been provided with information about: fall prevention and preventive services including:   · Fall Risk assessment done.    Visit Diagnoses:    ICD-10-CM ICD-9-CM   1. Essential hypertension I10 401.9   2. Primary osteoarthritis involving multiple joints M15.0 715.09   3. Medicare annual wellness visit, subsequent Z00.00 V70.0       Orders Placed This Encounter   Procedures   • Comprehensive Metabolic Panel   • Lipid Panel   • CBC & Differential     Order Specific Question:   Manual Differential     Answer:   No   • Urinalysis With Microscopic - Urine, Clean Catch       Outpatient Encounter Medications as of 7/10/2019   Medication Sig Dispense Refill   • fluticasone (FLONASE) 50 MCG/ACT nasal spray 2 sprays into each nostril Daily.     • furosemide (LASIX) 20 MG tablet TAKE ONE TABLET BY MOUTH DAILY 90 tablet 2   • mometasone-formoterol (DULERA 200) 200-5 MCG/ACT inhaler Inhale 2 puffs 2 (Two) Times a Day.     • montelukast (SINGULAIR) 10 MG tablet Take 10 mg by mouth Every Night.     • NIFEdipine CC (ADALAT CC) 60 MG 24 hr tablet Take 1 tablet by mouth Daily. 90 tablet 3   • VENTOLIN  (90 Base) MCG/ACT inhaler INHALE TWO PUFFS BY MOUTH EVERY 4 HOURS AS NEEDED FOR WHEEZING 1 inhaler 5   • amoxicillin (AMOXIL) 875 MG tablet Take 1 tablet by mouth Every 12 (Twelve) Hours. 20 tablet 0   • benzonatate (TESSALON PERLES) 100 MG capsule Take 1 capsule by " mouth 3 (Three) Times a Day As Needed for Cough. 30 capsule 0   • MethylPREDNISolone (MEDROL, COREY,) 4 MG tablet Take as directed on package instructions. 1 each 0   • methylPREDNISolone (MEDROL, COREY,) 4 MG tablet follow package directions 21 tablet 0   • NIFEdipine XL (PROCARDIA XL) 60 MG 24 hr tablet TAKE 1 TABLET BY MOUTH EVERY DAY 90 tablet 0   • traZODone (DESYREL) 100 MG tablet Take 1 tablet by mouth At Night As Needed for Sleep. 30 tablet 5     No facility-administered encounter medications on file as of 7/10/2019.        Reviewed use of high risk medication in the elderly: not applicable  Reviewed for potential of harmful drug interactions in the elderly: not applicable    Follow Up:  No Follow-up on file.     An After Visit Summary and PPPS with all of these plans were given to the patient.

## 2019-07-10 NOTE — PROGRESS NOTES
SUBJECTIVE:  The patient is a 64-year-old white male who is here for follow-up.  He has a history of hypertension and asthma.  He has a history of arthritis.  He is here for Medicare wellness exam.  He complains of stiffness in his joints.  He is due a prostate exam.  He is up-to-date on colonoscopy.    PAST MEDICAL HISTORY:  Reviewed.    REVIEW OF SYSTEMS:  Please see above; all others reviewed and are negative.      OBJECTIVE:   Vitals signs are reviewed and are stable.    General:  Well-nourished.  Alert and oriented x3 in no acute distress.  HEENT: PERRLA.   Neck:  Supple.   Lungs:  Clear.    Heart:  Regular rate and rhythm.   Abdomen:   Soft, nontender.   Rectal: Prostate soft 2+ nontender no masses.  Hemoccult negative  Extremities:  No cyanosis, clubbing or edema.   Neurological:  Grossly intact without motor or sensory deficits.     ASSESSMENT:    Medicare wellness  Stiffness in joints  Polyarthralgia  Hypertension  Asthma  History of rheumatoid arthritis  Morbid obesity  PLAN: He is going to try Aleve for his stiffness of joints.  I told him he should probably reconnect with a rheumatologist because some of this is a manifestation of rheumatoid arthritis.  He states he does not like the way those medicines made him feel in the past however he will consider it.  CMP fasting lipids PSA CBC urinalysis ordered.  He will follow-up on labs.  Healthy lifestyle discussed.

## 2019-08-05 RX ORDER — FUROSEMIDE 20 MG/1
TABLET ORAL
Qty: 90 TABLET | Refills: 0 | Status: SHIPPED | OUTPATIENT
Start: 2019-08-05 | End: 2019-01-01 | Stop reason: SDUPTHER

## 2019-08-14 ENCOUNTER — OFFICE VISIT (OUTPATIENT)
Dept: FAMILY MEDICINE CLINIC | Facility: CLINIC | Age: 64
End: 2019-08-14

## 2019-08-14 VITALS
SYSTOLIC BLOOD PRESSURE: 130 MMHG | OXYGEN SATURATION: 95 % | HEIGHT: 67 IN | HEART RATE: 80 BPM | WEIGHT: 272 LBS | BODY MASS INDEX: 42.69 KG/M2 | TEMPERATURE: 98.2 F | DIASTOLIC BLOOD PRESSURE: 74 MMHG

## 2019-08-14 DIAGNOSIS — J01.00 ACUTE MAXILLARY SINUSITIS, RECURRENCE NOT SPECIFIED: Primary | ICD-10-CM

## 2019-08-14 DIAGNOSIS — J30.2 SEASONAL ALLERGIC RHINITIS, UNSPECIFIED TRIGGER: ICD-10-CM

## 2019-08-14 DIAGNOSIS — J43.9 PULMONARY EMPHYSEMA, UNSPECIFIED EMPHYSEMA TYPE (HCC): ICD-10-CM

## 2019-08-14 DIAGNOSIS — J45.41 MODERATE PERSISTENT ASTHMA WITH ACUTE EXACERBATION: ICD-10-CM

## 2019-08-14 DIAGNOSIS — Z72.0 TOBACCO ABUSE: ICD-10-CM

## 2019-08-14 PROCEDURE — 99213 OFFICE O/P EST LOW 20 MIN: CPT | Performed by: NURSE PRACTITIONER

## 2019-08-14 RX ORDER — AMOXICILLIN 875 MG/1
875 TABLET, COATED ORAL EVERY 12 HOURS SCHEDULED
Qty: 20 TABLET | Refills: 0 | Status: SHIPPED | OUTPATIENT
Start: 2019-08-14 | End: 2019-01-01

## 2019-08-14 NOTE — PROGRESS NOTES
Subjective   Ryley Vazquez is a 64 y.o. male.     History of Present Illness   C/o sinus congestion and tenderness x 2 wks with PND, HA, ear congestion, sl wheezing and SOA, no cough, no NV but GI upset from mucus production, no OTC, wife recently had sinus infection, NKDA, last tx sinusitis, asthmatic bronchitis and allergies 01/19 medrol dose pack, amox 875 mg BID and tessalon perles, sees allergist Dr Jones, with chronic allergies and asthma on dulera, singulair, flonase and albuterol increased usage, still smoking few cigarettes daily with last chest imaging 2017 showing emphysema    The following portions of the patient's history were reviewed and updated as appropriate: allergies, current medications, past family history, past medical history, past social history, past surgical history and problem list.    Review of Systems   Constitutional: Negative for fever.   HENT: Positive for congestion, ear pain, facial swelling, postnasal drip, sinus pressure and sinus pain. Negative for dental problem, drooling, ear discharge, hearing loss, mouth sores, nosebleeds, sneezing, sore throat, tinnitus, trouble swallowing and voice change.    Respiratory: Positive for chest tightness, shortness of breath and wheezing. Negative for cough.    Cardiovascular: Negative for chest pain, palpitations and leg swelling.   Gastrointestinal: Positive for abdominal pain. Negative for nausea and vomiting.   Allergic/Immunologic: Positive for environmental allergies.   Neurological: Positive for headaches. Negative for dizziness.   All other systems reviewed and are negative.      Objective   Physical Exam   Constitutional: He is oriented to person, place, and time. He appears well-developed and well-nourished.   HENT:   Head: Normocephalic and atraumatic.   Right Ear: Hearing normal. A middle ear effusion is present.   Left Ear: Hearing normal. A middle ear effusion is present.   Nose: Mucosal edema present. Right sinus exhibits  maxillary sinus tenderness and frontal sinus tenderness. Left sinus exhibits maxillary sinus tenderness and frontal sinus tenderness.   Mouth/Throat: Oropharynx is clear and moist.   Eyes: Conjunctivae and EOM are normal. Pupils are equal, round, and reactive to light.   Neck: Normal range of motion. Neck supple. No thyromegaly present.   Cardiovascular: Normal rate, regular rhythm and normal heart sounds.   Pulmonary/Chest: Effort normal and breath sounds normal.   Diminished breath sounds consistent with COPD   Musculoskeletal: Normal range of motion.   Lymphadenopathy:     He has cervical adenopathy.   Neurological: He is alert and oriented to person, place, and time.   Skin: Skin is warm and dry.   Psychiatric: He has a normal mood and affect. His behavior is normal. Judgment and thought content normal.   Vitals reviewed.      Assessment/Plan   Ryley was seen today for sinusitis.    Diagnoses and all orders for this visit:    Acute maxillary sinusitis, recurrence not specified    Seasonal allergic rhinitis, unspecified trigger    Moderate persistent asthma with acute exacerbation    Pulmonary emphysema, unspecified emphysema type (CMS/HCC)    Tobacco abuse    Other orders  -     amoxicillin (AMOXIL) 875 MG tablet; Take 1 tablet by mouth Every 12 (Twelve) Hours.    erx amox 875 mg BID x 10 days, increase H20 and rest, cont all chronic allergy and asthma meds and FU with Dr Jones, consider change trelegy, enc smoking cessation, call or RTC if sx persist or worsen

## 2019-08-14 NOTE — PATIENT INSTRUCTIONS
erx amox 875 mg BID x 10 days, increase H20 and rest, cont all chronic allergy and asthma meds and FU with Dr Jones, consider change trelegy, enc smoking cessation, call or RTC if sx persist or worsen

## 2019-08-18 RX ORDER — NIFEDIPINE 60 MG/1
TABLET, EXTENDED RELEASE ORAL
Qty: 90 TABLET | Refills: 0 | Status: SHIPPED | OUTPATIENT
Start: 2019-08-18 | End: 2019-01-01 | Stop reason: SDUPTHER

## 2019-09-04 NOTE — PROGRESS NOTES
SUBJECTIVE:  The patient is a 64-year-old male who comes in because of increasing stiffness and pain in his joints.  He has a history of rheumatoid arthritis in the past however was not tolerant of medication.  This was quite a few years ago.  He fell yesterday and landed on his buttocks.  He has some mild discomfort there now but is able to tolerate it.  There was no other injury from the fall.    PAST MEDICAL HISTORY:  Reviewed.    REVIEW OF SYSTEMS:  Please see above; 14 point ROS negative.      OBJECTIVE:   Vitals signs are reviewed and are stable.    General:  Well-nourished.  Alert and oriented x3 in no acute distress.  HEENT: PERRLA.   Neck:  Supple.   Lungs:  Clear.    Heart:  Regular rate and rhythm.   Abdomen:   Soft, nontender.   Extremities:  No cyanosis, clubbing or edema.  Tenderness is present in all the joints particularly the left shoulder.  Back: Mild tenderness to palpation on the sacrum.  No lumbar or thoracic tenderness.  No cervical tenderness.  Neurological:  Grossly intact without motor or sensory deficits.     ASSESSMENT:    Polyarthralgia  Coccyx contusion  PLAN: We will repeat autoimmune profile.  CMP CBC.  Once labs are back we will re-refer to rheumatology.  Take Tylenol for pain.  Follow-up on labs.  Call if problems.

## 2019-09-18 NOTE — TELEPHONE ENCOUNTER
PATIENT RECEIVED A CALL FEW MINUTES AGO BUT NO MESSAGE WAS LEFT. WANTED TO KNOW IF YOU HAD CALLED HIM THIS MORNING

## 2019-09-30 NOTE — PATIENT INSTRUCTIONS
UA today will send for culture and call with results.   psa today,   Start doxycycline 100mg bid for 7 days.   Drink plenty of H2O,   Avoid bladder irritants- coffee, caffeine, carbonation.  If symptoms persist 5-7 days call office can also consider urology if needed.   Increase fluid intake, get plenty of rest.   Patient agrees with plan of care and understands instructions. Call if worsening symptoms or any problems or concerns.

## 2019-09-30 NOTE — PROGRESS NOTES
Subjective   Ryley Vazquez is a 64 y.o. male.     History of Present Illness   C/o prostatitis, he was seen by Dr. Sorto on 9/5/19 given Levaquin, states he had nausea with medication, states he finished medication, but symptoms returned about 4 days ago. Denies fever. He has dysuria, urinary frequency. Denies bleeding. He has nocturia. He does not seer urology.       The following portions of the patient's history were reviewed and updated as appropriate: allergies, current medications, past family history, past medical history, past social history, past surgical history and problem list.    Review of Systems   Constitutional: Negative for chills, diaphoresis and fever.   Respiratory: Negative for cough and shortness of breath.    Cardiovascular: Negative for chest pain.   Genitourinary: Positive for dysuria, frequency and nocturia. Negative for flank pain and hematuria.   Musculoskeletal: Negative for arthralgias and myalgias.   Neurological: Negative for dizziness, light-headedness and headache.   All other systems reviewed and are negative.      Objective   Physical Exam   Constitutional: He is oriented to person, place, and time. He appears well-developed and well-nourished.   HENT:   Head: Normocephalic.   Eyes: Pupils are equal, round, and reactive to light.   Cardiovascular: Normal rate, regular rhythm and normal heart sounds.   Pulmonary/Chest: Effort normal and breath sounds normal.   Abdominal: There is no CVA tenderness.   Musculoskeletal: Normal range of motion.   Neurological: He is alert and oriented to person, place, and time.   Skin: Skin is warm and dry.   Psychiatric: He has a normal mood and affect. His behavior is normal.   Nursing note and vitals reviewed.        Assessment/Plan   Ryley was seen today for follow-up.    Diagnoses and all orders for this visit:    Dysuria  -     Urinalysis With Microscopic - Urine, Clean Catch  -     Urine Culture - Urine, Urine, Clean Catch  -     PSA  DIAGNOSTIC ONLY  -     Urinalysis without microscopic (no culture) - Urine, Clean Catch  -     Urinalysis, Microscopic Only - Urine, Clean Catch    Nocturia   -     PSA DIAGNOSTIC ONLY    Acute cystitis with hematuria    Other orders  -     doxycycline (VIBRAMYICN) 100 MG tablet; Take 1 tablet by mouth 2 (Two) Times a Day.        UA today will send for culture and call with results.   psa today,   Start doxycycline 100mg bid for 7 days.   Drink plenty of H2O,   Avoid bladder irritants- coffee, caffeine, carbonation.  If symptoms persist 5-7 days call office can also consider urology if needed.   Increase fluid intake, get plenty of rest.   Patient agrees with plan of care and understands instructions. Call if worsening symptoms or any problems or concerns.

## 2019-10-31 NOTE — TELEPHONE ENCOUNTER
[facility]    ----- Message from AGUSTÍN Garcia sent at 10/31/2019 12:15 PM EDT -----  Please call patient with results.UA is normal, blood resolved.

## 2019-11-20 NOTE — PROGRESS NOTES
SUBJECTIVE:  The patient is a 64-year-old white male.  He was diagnosed with rheumatoid arthritis at the end of the summer.  He has been seen by his rheumatologist and is supposed to start treatment today.  He states over the last few weeks he has become weak in his extremities his muscles ache all over he cannot  things he drops things and has noticed the right hand tremor at times.  No history of neurological disorders.  No headache.  He does get numb in his extremities.  No ocular disturbances.  No head injury.    PAST MEDICAL HISTORY:  Reviewed.    REVIEW OF SYSTEMS:  Please see above; 14 point ROS negative.      OBJECTIVE:   Vitals signs are reviewed and are stable.    General:  Well-nourished.  Alert and oriented x3 in no acute distress.  HEENT: PERRLA.   Neck:  Supple.   Lungs:  Clear.    Heart:  Regular rate and rhythm.   Abdomen:   Soft, nontender.   Extremities:  No cyanosis, clubbing or edema.   Neurological:  Grossly intact without motor or sensory deficits.  DTRs hyperreflexic left upper and left lower extremity.    ASSESSMENT:    Rheumatoid arthritis  Rule out neurological disorder.  PLAN: We will proceed with neurological work-up including refer to neurology and MRI of the brain.  He is to see his rheumatologist today and he will convey the symptoms to her as well.  He will follow-up with me once the referrals are done.    Dictated utilizing Dragon dictation.

## 2019-12-09 NOTE — TELEPHONE ENCOUNTER
Nothing to suggest a neurological process however I do want you to keep your appointment with neurologist

## 2020-01-01 ENCOUNTER — TELEPHONE (OUTPATIENT)
Dept: ONCOLOGY | Facility: CLINIC | Age: 65
End: 2020-01-01

## 2020-01-01 ENCOUNTER — HOSPITAL ENCOUNTER (OUTPATIENT)
Dept: GENERAL RADIOLOGY | Facility: HOSPITAL | Age: 65
Discharge: HOME | End: 2020-05-29

## 2020-01-01 ENCOUNTER — TELEPHONE (OUTPATIENT)
Dept: FAMILY MEDICINE CLINIC | Facility: CLINIC | Age: 65
End: 2020-01-01

## 2020-01-01 ENCOUNTER — APPOINTMENT (OUTPATIENT)
Dept: GENERAL RADIOLOGY | Facility: HOSPITAL | Age: 65
End: 2020-01-01

## 2020-01-01 ENCOUNTER — PREP FOR SURGERY (OUTPATIENT)
Dept: OTHER | Facility: HOSPITAL | Age: 65
End: 2020-01-01

## 2020-01-01 ENCOUNTER — HOSPITAL ENCOUNTER (OUTPATIENT)
Dept: CT IMAGING | Facility: HOSPITAL | Age: 65
Discharge: HOME | End: 2020-05-14
Admitting: NEUROLOGICAL SURGERY

## 2020-01-01 ENCOUNTER — TELEPHONE (OUTPATIENT)
Dept: NEUROLOGY | Facility: CLINIC | Age: 65
End: 2020-01-01

## 2020-01-01 ENCOUNTER — TELEPHONE (OUTPATIENT)
Dept: GENERAL RADIOLOGY | Facility: HOSPITAL | Age: 65
End: 2020-01-01

## 2020-01-01 ENCOUNTER — ANESTHESIA EVENT (OUTPATIENT)
Dept: PERIOP | Facility: HOSPITAL | Age: 65
End: 2020-01-01

## 2020-01-01 ENCOUNTER — TELEPHONE (OUTPATIENT)
Dept: INTERVENTIONAL RADIOLOGY/VASCULAR | Facility: HOSPITAL | Age: 65
End: 2020-01-01

## 2020-01-01 ENCOUNTER — APPOINTMENT (OUTPATIENT)
Dept: PREADMISSION TESTING | Facility: HOSPITAL | Age: 65
End: 2020-01-01

## 2020-01-01 ENCOUNTER — HOSPITAL ENCOUNTER (OUTPATIENT)
Dept: CT IMAGING | Facility: HOSPITAL | Age: 65
Discharge: HOME | End: 2020-05-29
Admitting: FAMILY MEDICINE

## 2020-01-01 ENCOUNTER — TELEPHONE (OUTPATIENT)
Dept: NEUROSURGERY | Facility: CLINIC | Age: 65
End: 2020-01-01

## 2020-01-01 ENCOUNTER — HOSPITAL ENCOUNTER (OUTPATIENT)
Dept: CT IMAGING | Facility: HOSPITAL | Age: 65
Discharge: HOME | End: 2020-06-25
Admitting: RADIOLOGY

## 2020-01-01 ENCOUNTER — OFFICE VISIT (OUTPATIENT)
Dept: FAMILY MEDICINE CLINIC | Facility: CLINIC | Age: 65
End: 2020-01-01

## 2020-01-01 ENCOUNTER — ANESTHESIA (OUTPATIENT)
Dept: PERIOP | Facility: HOSPITAL | Age: 65
End: 2020-01-01

## 2020-01-01 ENCOUNTER — OFFICE VISIT (OUTPATIENT)
Dept: SURGERY | Facility: CLINIC | Age: 65
End: 2020-01-01

## 2020-01-01 ENCOUNTER — HOSPITAL ENCOUNTER (OUTPATIENT)
Dept: GENERAL RADIOLOGY | Facility: HOSPITAL | Age: 65
Discharge: HOME | End: 2020-05-14

## 2020-01-01 ENCOUNTER — APPOINTMENT (OUTPATIENT)
Dept: PET IMAGING | Facility: HOSPITAL | Age: 65
End: 2020-01-01

## 2020-01-01 ENCOUNTER — APPOINTMENT (OUTPATIENT)
Dept: LAB | Facility: HOSPITAL | Age: 65
End: 2020-01-01

## 2020-01-01 ENCOUNTER — PROCEDURE VISIT (OUTPATIENT)
Dept: NEUROLOGY | Facility: CLINIC | Age: 65
End: 2020-01-01

## 2020-01-01 ENCOUNTER — OFFICE VISIT (OUTPATIENT)
Dept: NEUROSURGERY | Facility: CLINIC | Age: 65
End: 2020-01-01

## 2020-01-01 ENCOUNTER — TRANSCRIBE ORDERS (OUTPATIENT)
Dept: SLEEP MEDICINE | Facility: HOSPITAL | Age: 65
End: 2020-01-01

## 2020-01-01 ENCOUNTER — OFFICE VISIT (OUTPATIENT)
Dept: NEUROLOGY | Facility: CLINIC | Age: 65
End: 2020-01-01

## 2020-01-01 ENCOUNTER — OFFICE VISIT (OUTPATIENT)
Dept: ONCOLOGY | Facility: CLINIC | Age: 65
End: 2020-01-01

## 2020-01-01 ENCOUNTER — LAB (OUTPATIENT)
Dept: LAB | Facility: HOSPITAL | Age: 65
End: 2020-01-01

## 2020-01-01 ENCOUNTER — TRANSCRIBE ORDERS (OUTPATIENT)
Dept: ADMINISTRATIVE | Facility: HOSPITAL | Age: 65
End: 2020-01-01

## 2020-01-01 ENCOUNTER — TELEPHONE (OUTPATIENT)
Dept: ONCOLOGY | Facility: HOSPITAL | Age: 65
End: 2020-01-01

## 2020-01-01 ENCOUNTER — OFFICE VISIT (OUTPATIENT)
Dept: OTHER | Facility: HOSPITAL | Age: 65
End: 2020-01-01

## 2020-01-01 ENCOUNTER — HOSPITAL ENCOUNTER (OUTPATIENT)
Facility: HOSPITAL | Age: 65
Setting detail: HOSPITAL OUTPATIENT SURGERY
Discharge: HOME | End: 2020-07-10
Attending: THORACIC SURGERY (CARDIOTHORACIC VASCULAR SURGERY) | Admitting: THORACIC SURGERY (CARDIOTHORACIC VASCULAR SURGERY)

## 2020-01-01 ENCOUNTER — DOCUMENTATION (OUTPATIENT)
Dept: ONCOLOGY | Facility: CLINIC | Age: 65
End: 2020-01-01

## 2020-01-01 ENCOUNTER — HOSPITAL ENCOUNTER (INPATIENT)
Facility: HOSPITAL | Age: 65
LOS: 2 days | End: 2020-08-20
Attending: EMERGENCY MEDICINE | Admitting: HOSPITALIST

## 2020-01-01 VITALS
DIASTOLIC BLOOD PRESSURE: 77 MMHG | HEART RATE: 61 BPM | OXYGEN SATURATION: 96 % | SYSTOLIC BLOOD PRESSURE: 143 MMHG | TEMPERATURE: 98.2 F

## 2020-01-01 VITALS
SYSTOLIC BLOOD PRESSURE: 134 MMHG | HEIGHT: 67 IN | DIASTOLIC BLOOD PRESSURE: 82 MMHG | OXYGEN SATURATION: 66 % | WEIGHT: 222.4 LBS | TEMPERATURE: 97.2 F | BODY MASS INDEX: 34.91 KG/M2

## 2020-01-01 VITALS
WEIGHT: 271 LBS | SYSTOLIC BLOOD PRESSURE: 134 MMHG | HEART RATE: 68 BPM | OXYGEN SATURATION: 98 % | DIASTOLIC BLOOD PRESSURE: 78 MMHG | HEIGHT: 67 IN | BODY MASS INDEX: 42.53 KG/M2

## 2020-01-01 VITALS
HEART RATE: 82 BPM | TEMPERATURE: 98 F | HEIGHT: 67 IN | BODY MASS INDEX: 40.24 KG/M2 | OXYGEN SATURATION: 97 % | DIASTOLIC BLOOD PRESSURE: 67 MMHG | SYSTOLIC BLOOD PRESSURE: 138 MMHG | RESPIRATION RATE: 16 BRPM

## 2020-01-01 VITALS
DIASTOLIC BLOOD PRESSURE: 83 MMHG | HEIGHT: 67 IN | TEMPERATURE: 98 F | HEART RATE: 82 BPM | WEIGHT: 245 LBS | BODY MASS INDEX: 38.45 KG/M2 | OXYGEN SATURATION: 92 % | SYSTOLIC BLOOD PRESSURE: 127 MMHG | RESPIRATION RATE: 18 BRPM

## 2020-01-01 VITALS — TEMPERATURE: 97.6 F | DIASTOLIC BLOOD PRESSURE: 78 MMHG | SYSTOLIC BLOOD PRESSURE: 122 MMHG | OXYGEN SATURATION: 96 %

## 2020-01-01 VITALS
OXYGEN SATURATION: 94 % | HEIGHT: 67 IN | WEIGHT: 257 LBS | HEART RATE: 78 BPM | SYSTOLIC BLOOD PRESSURE: 154 MMHG | TEMPERATURE: 98.2 F | DIASTOLIC BLOOD PRESSURE: 86 MMHG | BODY MASS INDEX: 40.34 KG/M2 | RESPIRATION RATE: 16 BRPM

## 2020-01-01 VITALS
SYSTOLIC BLOOD PRESSURE: 132 MMHG | WEIGHT: 245 LBS | HEART RATE: 72 BPM | TEMPERATURE: 97.3 F | OXYGEN SATURATION: 98 % | DIASTOLIC BLOOD PRESSURE: 78 MMHG | BODY MASS INDEX: 38.45 KG/M2 | HEIGHT: 67 IN

## 2020-01-01 VITALS
RESPIRATION RATE: 16 BRPM | OXYGEN SATURATION: 97 % | SYSTOLIC BLOOD PRESSURE: 147 MMHG | DIASTOLIC BLOOD PRESSURE: 71 MMHG | WEIGHT: 257 LBS | HEART RATE: 60 BPM | BODY MASS INDEX: 40.25 KG/M2 | TEMPERATURE: 97.6 F

## 2020-01-01 VITALS — TEMPERATURE: 98.2 F | SYSTOLIC BLOOD PRESSURE: 141 MMHG | HEART RATE: 81 BPM | DIASTOLIC BLOOD PRESSURE: 77 MMHG

## 2020-01-01 VITALS
SYSTOLIC BLOOD PRESSURE: 155 MMHG | WEIGHT: 257 LBS | DIASTOLIC BLOOD PRESSURE: 90 MMHG | HEIGHT: 67 IN | HEART RATE: 105 BPM | TEMPERATURE: 97.4 F | RESPIRATION RATE: 20 BRPM | OXYGEN SATURATION: 94 % | BODY MASS INDEX: 40.34 KG/M2

## 2020-01-01 VITALS
TEMPERATURE: 98 F | HEIGHT: 67 IN | OXYGEN SATURATION: 98 % | WEIGHT: 276 LBS | HEART RATE: 64 BPM | BODY MASS INDEX: 43.32 KG/M2 | DIASTOLIC BLOOD PRESSURE: 76 MMHG | SYSTOLIC BLOOD PRESSURE: 120 MMHG

## 2020-01-01 VITALS
DIASTOLIC BLOOD PRESSURE: 60 MMHG | BODY MASS INDEX: 40.34 KG/M2 | HEART RATE: 61 BPM | HEIGHT: 67 IN | TEMPERATURE: 98.2 F | RESPIRATION RATE: 16 BRPM | OXYGEN SATURATION: 96 % | WEIGHT: 257 LBS | SYSTOLIC BLOOD PRESSURE: 150 MMHG

## 2020-01-01 VITALS — HEIGHT: 67 IN | BODY MASS INDEX: 40.34 KG/M2 | WEIGHT: 257 LBS

## 2020-01-01 DIAGNOSIS — G12.20 BULBAR MOTOR NEURON DISEASE (HCC): ICD-10-CM

## 2020-01-01 DIAGNOSIS — R09.81 NASAL CONGESTION: ICD-10-CM

## 2020-01-01 DIAGNOSIS — G95.9 DISEASE OF SPINAL CORD (HCC): Primary | ICD-10-CM

## 2020-01-01 DIAGNOSIS — G60.9 IDIOPATHIC PERIPHERAL NEUROPATHY: Primary | ICD-10-CM

## 2020-01-01 DIAGNOSIS — R79.1 ABNORMAL COAGULATION PROFILE: ICD-10-CM

## 2020-01-01 DIAGNOSIS — M54.50 BILATERAL LOW BACK PAIN WITHOUT SCIATICA, UNSPECIFIED CHRONICITY: Primary | ICD-10-CM

## 2020-01-01 DIAGNOSIS — M05.712 RHEUMATOID ARTHRITIS INVOLVING LEFT SHOULDER WITH POSITIVE RHEUMATOID FACTOR (HCC): ICD-10-CM

## 2020-01-01 DIAGNOSIS — J44.1 COPD EXACERBATION (HCC): ICD-10-CM

## 2020-01-01 DIAGNOSIS — R77.8 ELEVATED TROPONIN: ICD-10-CM

## 2020-01-01 DIAGNOSIS — Z01.818 OTHER SPECIFIED PRE-OPERATIVE EXAMINATION: Primary | ICD-10-CM

## 2020-01-01 DIAGNOSIS — C34.11 SMALL CELL LUNG CANCER, RIGHT UPPER LOBE (HCC): Primary | ICD-10-CM

## 2020-01-01 DIAGNOSIS — C7A.8 NEUROENDOCRINE CARCINOMA OF LUNG (HCC): ICD-10-CM

## 2020-01-01 DIAGNOSIS — M06.9 RHEUMATOID ARTHRITIS, INVOLVING UNSPECIFIED SITE, UNSPECIFIED RHEUMATOID FACTOR PRESENCE: ICD-10-CM

## 2020-01-01 DIAGNOSIS — M47.12 CERVICAL SPONDYLOSIS WITH MYELOPATHY: ICD-10-CM

## 2020-01-01 DIAGNOSIS — J45.41 MODERATE PERSISTENT ASTHMATIC BRONCHITIS WITH ACUTE EXACERBATION: Primary | ICD-10-CM

## 2020-01-01 DIAGNOSIS — R91.8 LUNG MASS: Primary | ICD-10-CM

## 2020-01-01 DIAGNOSIS — R53.1 WEAKNESS: ICD-10-CM

## 2020-01-01 DIAGNOSIS — J01.00 ACUTE MAXILLARY SINUSITIS, RECURRENCE NOT SPECIFIED: Primary | ICD-10-CM

## 2020-01-01 DIAGNOSIS — G60.9 IDIOPATHIC PERIPHERAL NEUROPATHY: ICD-10-CM

## 2020-01-01 DIAGNOSIS — I10 ESSENTIAL HYPERTENSION: ICD-10-CM

## 2020-01-01 DIAGNOSIS — M54.12 CERVICAL RADICULOPATHY: ICD-10-CM

## 2020-01-01 DIAGNOSIS — J30.2 SEASONAL ALLERGIC RHINITIS, UNSPECIFIED TRIGGER: ICD-10-CM

## 2020-01-01 DIAGNOSIS — J01.00 ACUTE MAXILLARY SINUSITIS, RECURRENCE NOT SPECIFIED: ICD-10-CM

## 2020-01-01 DIAGNOSIS — R59.0 HILAR ADENOPATHY: Primary | ICD-10-CM

## 2020-01-01 DIAGNOSIS — F40.240 CLAUSTROPHOBIA: ICD-10-CM

## 2020-01-01 DIAGNOSIS — R59.0 HILAR ADENOPATHY: ICD-10-CM

## 2020-01-01 DIAGNOSIS — J20.9 ACUTE BRONCHITIS, UNSPECIFIED ORGANISM: ICD-10-CM

## 2020-01-01 DIAGNOSIS — G62.9 NEUROPATHY: ICD-10-CM

## 2020-01-01 DIAGNOSIS — G95.9 DISEASE OF SPINAL CORD (HCC): ICD-10-CM

## 2020-01-01 DIAGNOSIS — M47.12 CERVICAL SPONDYLOSIS WITH MYELOPATHY: Primary | ICD-10-CM

## 2020-01-01 DIAGNOSIS — R91.8 MASS OF RIGHT LUNG: Primary | ICD-10-CM

## 2020-01-01 DIAGNOSIS — R73.03 PREDIABETES: ICD-10-CM

## 2020-01-01 DIAGNOSIS — R91.8 LUNG MASS: ICD-10-CM

## 2020-01-01 DIAGNOSIS — G61.81 CHRONIC INFLAMMATORY DEMYELINATING POLYNEURITIS (HCC): ICD-10-CM

## 2020-01-01 DIAGNOSIS — M15.9 OSTEOARTHRITIS OF MULTIPLE JOINTS, UNSPECIFIED OSTEOARTHRITIS TYPE: ICD-10-CM

## 2020-01-01 DIAGNOSIS — R53.1 ASTHENIA: ICD-10-CM

## 2020-01-01 DIAGNOSIS — N41.9 PROSTATITIS, UNSPECIFIED PROSTATITIS TYPE: Primary | ICD-10-CM

## 2020-01-01 DIAGNOSIS — Z01.818 OTHER SPECIFIED PRE-OPERATIVE EXAMINATION: ICD-10-CM

## 2020-01-01 DIAGNOSIS — F41.9 ANXIETY: Primary | ICD-10-CM

## 2020-01-01 DIAGNOSIS — G95.9 MYELOPATHY (HCC): Primary | ICD-10-CM

## 2020-01-01 DIAGNOSIS — R05.9 COUGH: Primary | ICD-10-CM

## 2020-01-01 DIAGNOSIS — J30.9 ALLERGIC RHINITIS, UNSPECIFIED SEASONALITY, UNSPECIFIED TRIGGER: ICD-10-CM

## 2020-01-01 DIAGNOSIS — R33.8 ACUTE URINARY RETENTION: Primary | ICD-10-CM

## 2020-01-01 DIAGNOSIS — R73.9 HYPERGLYCEMIA, UNSPECIFIED: ICD-10-CM

## 2020-01-01 DIAGNOSIS — Z72.0 TOBACCO ABUSE: ICD-10-CM

## 2020-01-01 DIAGNOSIS — F41.9 ANXIETY: ICD-10-CM

## 2020-01-01 LAB
ALBUMIN SERPL-MCNC: 3.9 G/DL (ref 2.9–4.4)
ALBUMIN SERPL-MCNC: 4.3 G/DL (ref 3.5–5.2)
ALBUMIN/GLOB SERPL: 1.3 {RATIO} (ref 0.7–1.7)
ALBUMIN/GLOB SERPL: 1.7 G/DL
ALP SERPL-CCNC: 119 U/L (ref 39–117)
ALPHA1 GLOB FLD ELPH-MCNC: 0.2 G/DL (ref 0–0.4)
ALPHA2 GLOB SERPL ELPH-MCNC: 0.7 G/DL (ref 0.4–1)
ALT SERPL W P-5'-P-CCNC: 44 U/L (ref 1–41)
ANION GAP SERPL CALCULATED.3IONS-SCNC: 11.6 MMOL/L (ref 5–15)
ANION GAP SERPL CALCULATED.3IONS-SCNC: 13.8 MMOL/L (ref 5–15)
ANION GAP SERPL CALCULATED.3IONS-SCNC: 13.9 MMOL/L (ref 5–15)
ANION GAP SERPL CALCULATED.3IONS-SCNC: 14.1 MMOL/L (ref 5–15)
ARSENIC BLD-MCNC: 5 UG/L (ref 2–23)
AST SERPL-CCNC: 67 U/L (ref 1–40)
B-GLOBULIN SERPL ELPH-MCNC: 1 G/DL (ref 0.7–1.3)
BACTERIA UR QL AUTO: NORMAL /HPF
BASOPHILS # BLD AUTO: 0.02 10*3/MM3 (ref 0–0.2)
BASOPHILS # BLD AUTO: 0.02 10*3/MM3 (ref 0–0.2)
BASOPHILS # BLD AUTO: 0.04 10*3/MM3 (ref 0–0.2)
BASOPHILS NFR BLD AUTO: 0.2 % (ref 0–1.5)
BASOPHILS NFR BLD AUTO: 0.4 % (ref 0–1.5)
BASOPHILS NFR BLD AUTO: 0.5 % (ref 0–1.5)
BILIRUB SERPL-MCNC: 2.3 MG/DL (ref 0–1.2)
BILIRUB UR QL STRIP: NEGATIVE
BUN SERPL-MCNC: 21 MG/DL (ref 8–23)
BUN SERPL-MCNC: 29 MG/DL (ref 8–23)
BUN SERPL-MCNC: 31 MG/DL (ref 8–23)
BUN SERPL-MCNC: 9 MG/DL (ref 8–23)
BUN/CREAT SERPL: 19.6 (ref 7–25)
BUN/CREAT SERPL: 33 (ref 7–25)
BUN/CREAT SERPL: 40.4 (ref 7–25)
BUN/CREAT SERPL: 43.3 (ref 7–25)
CALCIUM SPEC-SCNC: 8.9 MG/DL (ref 8.6–10.5)
CALCIUM SPEC-SCNC: 9 MG/DL (ref 8.6–10.5)
CALCIUM SPEC-SCNC: 9.3 MG/DL (ref 8.6–10.5)
CALCIUM SPEC-SCNC: 9.9 MG/DL (ref 8.6–10.5)
CHLORIDE SERPL-SCNC: 86 MMOL/L (ref 98–107)
CHLORIDE SERPL-SCNC: 87 MMOL/L (ref 98–107)
CHLORIDE SERPL-SCNC: 88 MMOL/L (ref 98–107)
CHLORIDE SERPL-SCNC: 89 MMOL/L (ref 98–107)
CHLORIDE UR-SCNC: 25 MMOL/L
CK SERPL-CCNC: 196 U/L (ref 20–200)
CLARITY UR: CLEAR
CO2 SERPL-SCNC: 22.4 MMOL/L (ref 22–29)
CO2 SERPL-SCNC: 26.9 MMOL/L (ref 22–29)
CO2 SERPL-SCNC: 27.2 MMOL/L (ref 22–29)
CO2 SERPL-SCNC: 32.1 MMOL/L (ref 22–29)
COLOR UR: ABNORMAL
COPPER SERPL-MCNC: 79 UG/DL (ref 72–166)
COPPER SERPL-MCNC: 85 UG/DL (ref 72–166)
CREAT BLDA-MCNC: 0.4 MG/DL (ref 0.6–1.3)
CREAT SERPL-MCNC: 0.46 MG/DL (ref 0.76–1.27)
CREAT SERPL-MCNC: 0.52 MG/DL (ref 0.76–1.27)
CREAT SERPL-MCNC: 0.67 MG/DL (ref 0.76–1.27)
CREAT SERPL-MCNC: 0.94 MG/DL (ref 0.76–1.27)
CRYOGLOB SER QL 1D COLD INC: NORMAL
D-LACTATE SERPL-SCNC: 1.5 MMOL/L (ref 0.5–2)
DEPRECATED RDW RBC AUTO: 44.1 FL (ref 37–54)
DEPRECATED RDW RBC AUTO: 44.8 FL (ref 37–54)
DEPRECATED RDW RBC AUTO: 46.3 FL (ref 37–54)
DEPRECATED RDW RBC AUTO: 47.5 FL (ref 37–54)
EOSINOPHIL # BLD AUTO: 0.02 10*3/MM3 (ref 0–0.4)
EOSINOPHIL # BLD AUTO: 0.02 10*3/MM3 (ref 0–0.4)
EOSINOPHIL # BLD AUTO: 0.06 10*3/MM3 (ref 0–0.4)
EOSINOPHIL NFR BLD AUTO: 0.2 % (ref 0.3–6.2)
EOSINOPHIL NFR BLD AUTO: 0.2 % (ref 0.3–6.2)
EOSINOPHIL NFR BLD AUTO: 1.1 % (ref 0.3–6.2)
ERYTHROCYTE [DISTWIDTH] IN BLOOD BY AUTOMATED COUNT: 13.7 % (ref 12.3–15.4)
ERYTHROCYTE [DISTWIDTH] IN BLOOD BY AUTOMATED COUNT: 14 % (ref 12.3–15.4)
ERYTHROCYTE [DISTWIDTH] IN BLOOD BY AUTOMATED COUNT: 14.4 % (ref 12.3–15.4)
ERYTHROCYTE [DISTWIDTH] IN BLOOD BY AUTOMATED COUNT: 14.4 % (ref 12.3–15.4)
ERYTHROCYTE [SEDIMENTATION RATE] IN BLOOD: 2 MM/HR (ref 0–20)
FOLATE SERPL-MCNC: >20 NG/ML (ref 4.78–24.2)
GAMMA GLOB SERPL ELPH-MCNC: 1 G/DL (ref 0.4–1.8)
GFR SERPL CREATININE-BSD FRML MDRD: 119 ML/MIN/1.73
GFR SERPL CREATININE-BSD FRML MDRD: 80 ML/MIN/1.73
GFR SERPL CREATININE-BSD FRML MDRD: >150 ML/MIN/1.73
GFR SERPL CREATININE-BSD FRML MDRD: >150 ML/MIN/1.73
GLOBULIN SER CALC-MCNC: 3 G/DL (ref 2.2–3.9)
GLOBULIN UR ELPH-MCNC: 2.5 GM/DL
GLUCOSE SERPL-MCNC: 104 MG/DL (ref 65–99)
GLUCOSE SERPL-MCNC: 79 MG/DL (ref 65–99)
GLUCOSE SERPL-MCNC: 94 MG/DL (ref 65–99)
GLUCOSE SERPL-MCNC: 95 MG/DL (ref 65–99)
GLUCOSE UR STRIP-MCNC: NEGATIVE MG/DL
HBA1C MFR BLD: 5.51 % (ref 4.8–5.6)
HCT VFR BLD AUTO: 39.8 % (ref 37.5–51)
HCT VFR BLD AUTO: 42.6 % (ref 37.5–51)
HCT VFR BLD AUTO: 44 % (ref 37.5–51)
HCT VFR BLD AUTO: 44.8 % (ref 37.5–51)
HGB BLD-MCNC: 13.9 G/DL (ref 13–17.7)
HGB BLD-MCNC: 14.6 G/DL (ref 13–17.7)
HGB BLD-MCNC: 14.7 G/DL (ref 13–17.7)
HGB BLD-MCNC: 15.5 G/DL (ref 13–17.7)
HGB UR QL STRIP.AUTO: NEGATIVE
HYALINE CASTS UR QL AUTO: NORMAL /LPF
IGA SERPL-MCNC: 190 MG/DL (ref 61–437)
IGG SERPL-MCNC: 1006 MG/DL (ref 700–1600)
IGM SERPL-MCNC: 75 MG/DL (ref 20–172)
IMM GRANULOCYTES # BLD AUTO: 0.03 10*3/MM3 (ref 0–0.05)
IMM GRANULOCYTES NFR BLD AUTO: 0.5 % (ref 0–0.5)
INR PPP: 0.95 (ref 0.9–1.1)
INR PPP: 1.04 (ref 0.9–1.1)
KETONES UR QL STRIP: NEGATIVE
LAB AP CASE REPORT: NORMAL
LAB AP SPECIAL STAINS: NORMAL
LEAD BLD-MCNC: 4 UG/DL (ref 0–4)
LEUKOCYTE ESTERASE UR QL STRIP.AUTO: ABNORMAL
LYMPHOCYTES # BLD AUTO: 0.63 10*3/MM3 (ref 0.7–3.1)
LYMPHOCYTES # BLD AUTO: 0.66 10*3/MM3 (ref 0.7–3.1)
LYMPHOCYTES # BLD AUTO: 0.82 10*3/MM3 (ref 0.7–3.1)
LYMPHOCYTES NFR BLD AUTO: 14.4 % (ref 19.6–45.3)
LYMPHOCYTES NFR BLD AUTO: 6.3 % (ref 19.6–45.3)
LYMPHOCYTES NFR BLD AUTO: 7.9 % (ref 19.6–45.3)
Lab: NORMAL
Lab: NORMAL
M-SPIKE: NORMAL G/DL
MAGNESIUM SERPL-MCNC: 1.8 MG/DL (ref 1.6–2.4)
MCH RBC QN AUTO: 29.7 PG (ref 26.6–33)
MCH RBC QN AUTO: 30.4 PG (ref 26.6–33)
MCH RBC QN AUTO: 30.6 PG (ref 26.6–33)
MCH RBC QN AUTO: 31.1 PG (ref 26.6–33)
MCHC RBC AUTO-ENTMCNC: 33.2 G/DL (ref 31.5–35.7)
MCHC RBC AUTO-ENTMCNC: 34.5 G/DL (ref 31.5–35.7)
MCHC RBC AUTO-ENTMCNC: 34.6 G/DL (ref 31.5–35.7)
MCHC RBC AUTO-ENTMCNC: 34.9 G/DL (ref 31.5–35.7)
MCV RBC AUTO: 87.1 FL (ref 79–97)
MCV RBC AUTO: 88.5 FL (ref 79–97)
MCV RBC AUTO: 89.6 FL (ref 79–97)
MCV RBC AUTO: 90.3 FL (ref 79–97)
MERCURY BLD-MCNC: NORMAL UG/L (ref 0–14.9)
MONOCYTES # BLD AUTO: 0.54 10*3/MM3 (ref 0.1–0.9)
MONOCYTES # BLD AUTO: 0.66 10*3/MM3 (ref 0.1–0.9)
MONOCYTES # BLD AUTO: 0.69 10*3/MM3 (ref 0.1–0.9)
MONOCYTES NFR BLD AUTO: 12.1 % (ref 5–12)
MONOCYTES NFR BLD AUTO: 6.4 % (ref 5–12)
MONOCYTES NFR BLD AUTO: 6.5 % (ref 5–12)
NEUTROPHILS NFR BLD AUTO: 4.07 10*3/MM3 (ref 1.7–7)
NEUTROPHILS NFR BLD AUTO: 6.95 10*3/MM3 (ref 1.7–7)
NEUTROPHILS NFR BLD AUTO: 71.5 % (ref 42.7–76)
NEUTROPHILS NFR BLD AUTO: 8.54 10*3/MM3 (ref 1.7–7)
NEUTROPHILS NFR BLD AUTO: 83 % (ref 42.7–76)
NEUTROPHILS NFR BLD AUTO: 84.7 % (ref 42.7–76)
NITRITE UR QL STRIP: NEGATIVE
NRBC BLD AUTO-RTO: 0 /100 WBC (ref 0–0.2)
NT-PROBNP SERPL-MCNC: 611.9 PG/ML (ref 0–900)
OSMOLALITY UR: 365 MOSM/KG
PATH REPORT.FINAL DX SPEC: NORMAL
PATH REPORT.GROSS SPEC: NORMAL
PH UR STRIP.AUTO: <=5 [PH] (ref 5–8)
PLATELET # BLD AUTO: 105 10*3/MM3 (ref 140–450)
PLATELET # BLD AUTO: 107 10*3/MM3 (ref 140–450)
PLATELET # BLD AUTO: 129 10*3/MM3 (ref 140–450)
PLATELET # BLD AUTO: 164 10*3/MM3 (ref 140–450)
PLATELET # BLD AUTO: 166 10*3/MM3 (ref 140–450)
PMV BLD AUTO: 10.5 FL (ref 6–12)
PMV BLD AUTO: 10.5 FL (ref 6–12)
PMV BLD AUTO: 10.6 FL (ref 6–12)
PMV BLD AUTO: 9.9 FL (ref 6–12)
POTASSIUM SERPL-SCNC: 3.9 MMOL/L (ref 3.5–5.2)
POTASSIUM SERPL-SCNC: 4.1 MMOL/L (ref 3.5–5.2)
POTASSIUM SERPL-SCNC: 4.2 MMOL/L (ref 3.5–5.2)
POTASSIUM SERPL-SCNC: 4.5 MMOL/L (ref 3.5–5.2)
PROCALCITONIN SERPL-MCNC: 3.39 NG/ML (ref 0–0.25)
PROT PATTERN SERPL ELPH-IMP: NORMAL
PROT PATTERN SERPL IFE-IMP: NORMAL
PROT SERPL-MCNC: 6.8 G/DL (ref 6–8.5)
PROT SERPL-MCNC: 6.9 G/DL (ref 6–8.5)
PROT UR QL STRIP: NEGATIVE
PROTHROMBIN TIME: 12.6 SECONDS (ref 11.7–14.2)
PROTHROMBIN TIME: 13.3 SECONDS (ref 11.7–14.2)
RBC # BLD AUTO: 4.57 10*6/MM3 (ref 4.14–5.8)
RBC # BLD AUTO: 4.72 10*6/MM3 (ref 4.14–5.8)
RBC # BLD AUTO: 4.91 10*6/MM3 (ref 4.14–5.8)
RBC # BLD AUTO: 5.06 10*6/MM3 (ref 4.14–5.8)
RBC # UR: NORMAL /HPF
REF LAB TEST METHOD: NORMAL
RPR SER QL: NORMAL
SARS-COV-2 RNA RESP QL NAA+PROBE: NOT DETECTED
SODIUM SERPL-SCNC: 123 MMOL/L (ref 136–145)
SODIUM SERPL-SCNC: 128 MMOL/L (ref 136–145)
SODIUM SERPL-SCNC: 129 MMOL/L (ref 136–145)
SODIUM SERPL-SCNC: 132 MMOL/L (ref 136–145)
SODIUM UR-SCNC: <20 MMOL/L
SP GR UR STRIP: 1.02 (ref 1–1.03)
SQUAMOUS #/AREA URNS HPF: NORMAL /HPF
T4 FREE SERPL-MCNC: 1.44 NG/DL (ref 0.93–1.7)
TROPONIN T SERPL-MCNC: 0.05 NG/ML (ref 0–0.03)
TROPONIN T SERPL-MCNC: 0.05 NG/ML (ref 0–0.03)
TROPONIN T SERPL-MCNC: 0.07 NG/ML (ref 0–0.03)
TSH SERPL DL<=0.05 MIU/L-ACNC: 1.58 UIU/ML (ref 0.27–4.2)
UROBILINOGEN UR QL STRIP: ABNORMAL
VIT B12 BLD-MCNC: 184 PG/ML (ref 211–946)
VIT B12 BLD-MCNC: 639 PG/ML (ref 211–946)
WBC # BLD AUTO: 10.08 10*3/MM3 (ref 3.4–10.8)
WBC # BLD AUTO: 5.69 10*3/MM3 (ref 3.4–10.8)
WBC # BLD AUTO: 8.38 10*3/MM3 (ref 3.4–10.8)
WBC # BLD AUTO: 9.25 10*3/MM3 (ref 3.4–10.8)
WBC UR QL AUTO: NORMAL /HPF

## 2020-01-01 PROCEDURE — 99204 OFFICE O/P NEW MOD 45 MIN: CPT | Performed by: PSYCHIATRY & NEUROLOGY

## 2020-01-01 PROCEDURE — 99442 PR PHYS/QHP TELEPHONE EVALUATION 11-20 MIN: CPT | Performed by: NURSE PRACTITIONER

## 2020-01-01 PROCEDURE — 36415 COLL VENOUS BLD VENIPUNCTURE: CPT

## 2020-01-01 PROCEDURE — 94799 UNLISTED PULMONARY SVC/PX: CPT

## 2020-01-01 PROCEDURE — 83516 IMMUNOASSAY NONANTIBODY: CPT

## 2020-01-01 PROCEDURE — 25010000002 IOPAMIDOL 61 % SOLUTION: Performed by: NEUROLOGICAL SURGERY

## 2020-01-01 PROCEDURE — 99213 OFFICE O/P EST LOW 20 MIN: CPT | Performed by: FAMILY MEDICINE

## 2020-01-01 PROCEDURE — 25010000003 CEFAZOLIN IN DEXTROSE 2-4 GM/100ML-% SOLUTION: Performed by: THORACIC SURGERY (CARDIOTHORACIC VASCULAR SURGERY)

## 2020-01-01 PROCEDURE — 80053 COMPREHEN METABOLIC PANEL: CPT | Performed by: FAMILY MEDICINE

## 2020-01-01 PROCEDURE — 92611 MOTION FLUOROSCOPY/SWALLOW: CPT

## 2020-01-01 PROCEDURE — 99222 1ST HOSP IP/OBS MODERATE 55: CPT | Performed by: INTERNAL MEDICINE

## 2020-01-01 PROCEDURE — 82746 ASSAY OF FOLIC ACID SERUM: CPT | Performed by: PSYCHIATRY & NEUROLOGY

## 2020-01-01 PROCEDURE — 82607 VITAMIN B-12: CPT | Performed by: PSYCHIATRY & NEUROLOGY

## 2020-01-01 PROCEDURE — 71045 X-RAY EXAM CHEST 1 VIEW: CPT

## 2020-01-01 PROCEDURE — 77012 CT SCAN FOR NEEDLE BIOPSY: CPT

## 2020-01-01 PROCEDURE — 80053 COMPREHEN METABOLIC PANEL: CPT | Performed by: EMERGENCY MEDICINE

## 2020-01-01 PROCEDURE — 94640 AIRWAY INHALATION TREATMENT: CPT

## 2020-01-01 PROCEDURE — 72129 CT CHEST SPINE W/DYE: CPT

## 2020-01-01 PROCEDURE — 25010000002 MIDAZOLAM PER 1 MG: Performed by: ANESTHESIOLOGY

## 2020-01-01 PROCEDURE — 99441 PR PHYS/QHP TELEPHONE EVALUATION 5-10 MIN: CPT | Performed by: NURSE PRACTITIONER

## 2020-01-01 PROCEDURE — 25010000002 FENTANYL CITRATE (PF) 100 MCG/2ML SOLUTION: Performed by: ANESTHESIOLOGY

## 2020-01-01 PROCEDURE — 72114 X-RAY EXAM L-S SPINE BENDING: CPT

## 2020-01-01 PROCEDURE — 25010000002 FENTANYL CITRATE (PF) 100 MCG/2ML SOLUTION: Performed by: NURSE ANESTHETIST, CERTIFIED REGISTERED

## 2020-01-01 PROCEDURE — 99285 EMERGENCY DEPT VISIT HI MDM: CPT

## 2020-01-01 PROCEDURE — U0004 COV-19 TEST NON-CDC HGH THRU: HCPCS | Performed by: NURSE PRACTITIONER

## 2020-01-01 PROCEDURE — 25010000002 FUROSEMIDE PER 20 MG: Performed by: NURSE PRACTITIONER

## 2020-01-01 PROCEDURE — U0004 COV-19 TEST NON-CDC HGH THRU: HCPCS

## 2020-01-01 PROCEDURE — 85027 COMPLETE CBC AUTOMATED: CPT | Performed by: NURSE PRACTITIONER

## 2020-01-01 PROCEDURE — C9803 HOPD COVID-19 SPEC COLLECT: HCPCS

## 2020-01-01 PROCEDURE — 86592 SYPHILIS TEST NON-TREP QUAL: CPT | Performed by: PSYCHIATRY & NEUROLOGY

## 2020-01-01 PROCEDURE — 25010000002 FUROSEMIDE PER 20 MG: Performed by: EMERGENCY MEDICINE

## 2020-01-01 PROCEDURE — 84484 ASSAY OF TROPONIN QUANT: CPT | Performed by: NURSE PRACTITIONER

## 2020-01-01 PROCEDURE — 83036 HEMOGLOBIN GLYCOSYLATED A1C: CPT | Performed by: PSYCHIATRY & NEUROLOGY

## 2020-01-01 PROCEDURE — 95911 NRV CNDJ TEST 9-10 STUDIES: CPT | Performed by: PSYCHIATRY & NEUROLOGY

## 2020-01-01 PROCEDURE — P9612 CATHETERIZE FOR URINE SPEC: HCPCS

## 2020-01-01 PROCEDURE — 39402 MEDIASTINOSCPY W/LMPH NOD BX: CPT | Performed by: THORACIC SURGERY (CARDIOTHORACIC VASCULAR SURGERY)

## 2020-01-01 PROCEDURE — 84443 ASSAY THYROID STIM HORMONE: CPT | Performed by: FAMILY MEDICINE

## 2020-01-01 PROCEDURE — 25010000002 ONDANSETRON PER 1 MG: Performed by: NURSE ANESTHETIST, CERTIFIED REGISTERED

## 2020-01-01 PROCEDURE — 84145 PROCALCITONIN (PCT): CPT | Performed by: EMERGENCY MEDICINE

## 2020-01-01 PROCEDURE — 36415 COLL VENOUS BLD VENIPUNCTURE: CPT | Performed by: PSYCHIATRY & NEUROLOGY

## 2020-01-01 PROCEDURE — 85049 AUTOMATED PLATELET COUNT: CPT | Performed by: RADIOLOGY

## 2020-01-01 PROCEDURE — 25010000002 MORPHINE PER 10 MG: Performed by: NURSE PRACTITIONER

## 2020-01-01 PROCEDURE — 84165 PROTEIN E-PHORESIS SERUM: CPT | Performed by: PSYCHIATRY & NEUROLOGY

## 2020-01-01 PROCEDURE — 93005 ELECTROCARDIOGRAM TRACING: CPT

## 2020-01-01 PROCEDURE — 88360 TUMOR IMMUNOHISTOCHEM/MANUAL: CPT | Performed by: THORACIC SURGERY (CARDIOTHORACIC VASCULAR SURGERY)

## 2020-01-01 PROCEDURE — 25010000002 LORAZEPAM PER 2 MG: Performed by: INTERNAL MEDICINE

## 2020-01-01 PROCEDURE — 25010000002 IOPAMIDOL 61 % SOLUTION: Performed by: FAMILY MEDICINE

## 2020-01-01 PROCEDURE — 95886 MUSC TEST DONE W/N TEST COMP: CPT | Performed by: PSYCHIATRY & NEUROLOGY

## 2020-01-01 PROCEDURE — 74230 X-RAY XM SWLNG FUNCJ C+: CPT

## 2020-01-01 PROCEDURE — 99231 SBSQ HOSP IP/OBS SF/LOW 25: CPT | Performed by: INTERNAL MEDICINE

## 2020-01-01 PROCEDURE — 83935 ASSAY OF URINE OSMOLALITY: CPT | Performed by: NURSE PRACTITIONER

## 2020-01-01 PROCEDURE — 84443 ASSAY THYROID STIM HORMONE: CPT | Performed by: PSYCHIATRY & NEUROLOGY

## 2020-01-01 PROCEDURE — 62305 MYELOGRAPHY LUMBAR INJECTION: CPT

## 2020-01-01 PROCEDURE — 84155 ASSAY OF PROTEIN SERUM: CPT | Performed by: PSYCHIATRY & NEUROLOGY

## 2020-01-01 PROCEDURE — 80061 LIPID PANEL: CPT | Performed by: FAMILY MEDICINE

## 2020-01-01 PROCEDURE — 99205 OFFICE O/P NEW HI 60 MIN: CPT | Performed by: THORACIC SURGERY (CARDIOTHORACIC VASCULAR SURGERY)

## 2020-01-01 PROCEDURE — 36415 COLL VENOUS BLD VENIPUNCTURE: CPT | Performed by: NURSE PRACTITIONER

## 2020-01-01 PROCEDURE — 93010 ELECTROCARDIOGRAM REPORT: CPT | Performed by: INTERNAL MEDICINE

## 2020-01-01 PROCEDURE — 25010000002 PROPOFOL 10 MG/ML EMULSION: Performed by: NURSE ANESTHETIST, CERTIFIED REGISTERED

## 2020-01-01 PROCEDURE — 92610 EVALUATE SWALLOWING FUNCTION: CPT

## 2020-01-01 PROCEDURE — 83655 ASSAY OF LEAD: CPT | Performed by: PSYCHIATRY & NEUROLOGY

## 2020-01-01 PROCEDURE — 25010000003 LIDOCAINE 1 % SOLUTION: Performed by: NEUROLOGICAL SURGERY

## 2020-01-01 PROCEDURE — 82550 ASSAY OF CK (CPK): CPT | Performed by: EMERGENCY MEDICINE

## 2020-01-01 PROCEDURE — 72126 CT NECK SPINE W/DYE: CPT

## 2020-01-01 PROCEDURE — 85025 COMPLETE CBC W/AUTO DIFF WBC: CPT | Performed by: THORACIC SURGERY (CARDIOTHORACIC VASCULAR SURGERY)

## 2020-01-01 PROCEDURE — 84300 ASSAY OF URINE SODIUM: CPT | Performed by: NURSE PRACTITIONER

## 2020-01-01 PROCEDURE — 71260 CT THORAX DX C+: CPT

## 2020-01-01 PROCEDURE — 80048 BASIC METABOLIC PNL TOTAL CA: CPT | Performed by: NURSE PRACTITIONER

## 2020-01-01 PROCEDURE — 85025 COMPLETE CBC W/AUTO DIFF WBC: CPT | Performed by: NURSE PRACTITIONER

## 2020-01-01 PROCEDURE — 25010000002 DEXAMETHASONE PER 1 MG: Performed by: NURSE ANESTHETIST, CERTIFIED REGISTERED

## 2020-01-01 PROCEDURE — 25010000002 FUROSEMIDE PER 20 MG: Performed by: HOSPITALIST

## 2020-01-01 PROCEDURE — 81001 URINALYSIS AUTO W/SCOPE: CPT | Performed by: EMERGENCY MEDICINE

## 2020-01-01 PROCEDURE — 99213 OFFICE O/P EST LOW 20 MIN: CPT | Performed by: THORACIC SURGERY (CARDIOTHORACIC VASCULAR SURGERY)

## 2020-01-01 PROCEDURE — 83825 ASSAY OF MERCURY: CPT | Performed by: PSYCHIATRY & NEUROLOGY

## 2020-01-01 PROCEDURE — 85025 COMPLETE CBC W/AUTO DIFF WBC: CPT | Performed by: EMERGENCY MEDICINE

## 2020-01-01 PROCEDURE — 82565 ASSAY OF CREATININE: CPT

## 2020-01-01 PROCEDURE — 83880 ASSAY OF NATRIURETIC PEPTIDE: CPT | Performed by: EMERGENCY MEDICINE

## 2020-01-01 PROCEDURE — 84439 ASSAY OF FREE THYROXINE: CPT | Performed by: PSYCHIATRY & NEUROLOGY

## 2020-01-01 PROCEDURE — 84484 ASSAY OF TROPONIN QUANT: CPT | Performed by: EMERGENCY MEDICINE

## 2020-01-01 PROCEDURE — 88305 TISSUE EXAM BY PATHOLOGIST: CPT | Performed by: THORACIC SURGERY (CARDIOTHORACIC VASCULAR SURGERY)

## 2020-01-01 PROCEDURE — 88341 IMHCHEM/IMCYTCHM EA ADD ANTB: CPT | Performed by: THORACIC SURGERY (CARDIOTHORACIC VASCULAR SURGERY)

## 2020-01-01 PROCEDURE — 80048 BASIC METABOLIC PNL TOTAL CA: CPT | Performed by: THORACIC SURGERY (CARDIOTHORACIC VASCULAR SURGERY)

## 2020-01-01 PROCEDURE — 85652 RBC SED RATE AUTOMATED: CPT | Performed by: PSYCHIATRY & NEUROLOGY

## 2020-01-01 PROCEDURE — 82595 ASSAY OF CRYOGLOBULIN: CPT

## 2020-01-01 PROCEDURE — 86255 FLUORESCENT ANTIBODY SCREEN: CPT

## 2020-01-01 PROCEDURE — 99213 OFFICE O/P EST LOW 20 MIN: CPT | Performed by: NEUROLOGICAL SURGERY

## 2020-01-01 PROCEDURE — 25010000002 MORPHINE PER 10 MG: Performed by: INTERNAL MEDICINE

## 2020-01-01 PROCEDURE — 99443 PR PHYS/QHP TELEPHONE EVALUATION 21-30 MIN: CPT | Performed by: INTERNAL MEDICINE

## 2020-01-01 PROCEDURE — 25010000002 PHENYLEPHRINE PER 1 ML: Performed by: NURSE ANESTHETIST, CERTIFIED REGISTERED

## 2020-01-01 PROCEDURE — 99221 1ST HOSP IP/OBS SF/LOW 40: CPT | Performed by: INTERNAL MEDICINE

## 2020-01-01 PROCEDURE — 82436 ASSAY OF URINE CHLORIDE: CPT | Performed by: NURSE PRACTITIONER

## 2020-01-01 PROCEDURE — 99442 PR PHYS/QHP TELEPHONE EVALUATION 11-20 MIN: CPT | Performed by: FAMILY MEDICINE

## 2020-01-01 PROCEDURE — 25010000002 ENOXAPARIN PER 10 MG: Performed by: NURSE PRACTITIONER

## 2020-01-01 PROCEDURE — 83735 ASSAY OF MAGNESIUM: CPT | Performed by: EMERGENCY MEDICINE

## 2020-01-01 PROCEDURE — 25010000002 HYDROMORPHONE PER 4 MG: Performed by: NURSE ANESTHETIST, CERTIFIED REGISTERED

## 2020-01-01 PROCEDURE — 25010000002 ONDANSETRON PER 1 MG: Performed by: EMERGENCY MEDICINE

## 2020-01-01 PROCEDURE — U0004 COV-19 TEST NON-CDC HGH THRU: HCPCS | Performed by: EMERGENCY MEDICINE

## 2020-01-01 PROCEDURE — 85610 PROTHROMBIN TIME: CPT | Performed by: THORACIC SURGERY (CARDIOTHORACIC VASCULAR SURGERY)

## 2020-01-01 PROCEDURE — 82525 ASSAY OF COPPER: CPT

## 2020-01-01 PROCEDURE — 72132 CT LUMBAR SPINE W/DYE: CPT

## 2020-01-01 PROCEDURE — 72052 X-RAY EXAM NECK SPINE 6/>VWS: CPT

## 2020-01-01 PROCEDURE — 25010000002 MORPHINE PER 10 MG: Performed by: EMERGENCY MEDICINE

## 2020-01-01 PROCEDURE — 72240 MYELOGRAPHY NECK SPINE: CPT

## 2020-01-01 PROCEDURE — 85610 PROTHROMBIN TIME: CPT | Performed by: RADIOLOGY

## 2020-01-01 PROCEDURE — G0463 HOSPITAL OUTPT CLINIC VISIT: HCPCS

## 2020-01-01 PROCEDURE — 82175 ASSAY OF ARSENIC: CPT | Performed by: PSYCHIATRY & NEUROLOGY

## 2020-01-01 PROCEDURE — 88331 PATH CONSLTJ SURG 1 BLK 1SPC: CPT | Performed by: THORACIC SURGERY (CARDIOTHORACIC VASCULAR SURGERY)

## 2020-01-01 PROCEDURE — 93005 ELECTROCARDIOGRAM TRACING: CPT | Performed by: EMERGENCY MEDICINE

## 2020-01-01 PROCEDURE — 88342 IMHCHEM/IMCYTCHM 1ST ANTB: CPT | Performed by: THORACIC SURGERY (CARDIOTHORACIC VASCULAR SURGERY)

## 2020-01-01 PROCEDURE — 83605 ASSAY OF LACTIC ACID: CPT | Performed by: EMERGENCY MEDICINE

## 2020-01-01 DEVICE — ABSORBABLE HEMOSTAT (OXIDIZED REGENERATED CELLULOSE, U.S.P.)
Type: IMPLANTABLE DEVICE | Status: FUNCTIONAL
Brand: SURGICEL

## 2020-01-01 RX ORDER — CEFAZOLIN SODIUM 2 G/100ML
2 INJECTION, SOLUTION INTRAVENOUS ONCE
Status: COMPLETED | OUTPATIENT
Start: 2020-01-01 | End: 2020-01-01

## 2020-01-01 RX ORDER — HYDROCODONE BITARTRATE AND ACETAMINOPHEN 7.5; 325 MG/1; MG/1
1 TABLET ORAL ONCE
Status: COMPLETED | OUTPATIENT
Start: 2020-01-01 | End: 2020-01-01

## 2020-01-01 RX ORDER — SODIUM CHLORIDE 0.9 % (FLUSH) 0.9 %
10 SYRINGE (ML) INJECTION EVERY 12 HOURS SCHEDULED
Status: DISCONTINUED | OUTPATIENT
Start: 2020-01-01 | End: 2020-01-01

## 2020-01-01 RX ORDER — GUAIFENESIN 600 MG/1
1200 TABLET, EXTENDED RELEASE ORAL 2 TIMES DAILY
Qty: 60 TABLET | Refills: 1 | Status: SHIPPED | OUTPATIENT
Start: 2020-01-01

## 2020-01-01 RX ORDER — MIDAZOLAM HYDROCHLORIDE 1 MG/ML
1 INJECTION INTRAMUSCULAR; INTRAVENOUS
Status: COMPLETED | OUTPATIENT
Start: 2020-01-01 | End: 2020-01-01

## 2020-01-01 RX ORDER — FUROSEMIDE 20 MG/1
20 TABLET ORAL DAILY
Status: DISCONTINUED | OUTPATIENT
Start: 2020-01-01 | End: 2020-01-01

## 2020-01-01 RX ORDER — SODIUM CHLORIDE 0.9 % (FLUSH) 0.9 %
3 SYRINGE (ML) INJECTION EVERY 12 HOURS SCHEDULED
Status: CANCELLED | OUTPATIENT
Start: 2020-01-01

## 2020-01-01 RX ORDER — AMOXICILLIN AND CLAVULANATE POTASSIUM 875; 125 MG/1; MG/1
1 TABLET, FILM COATED ORAL 2 TIMES DAILY
Qty: 14 TABLET | Refills: 0 | Status: SHIPPED | OUTPATIENT
Start: 2020-01-01 | End: 2020-01-01

## 2020-01-01 RX ORDER — FUROSEMIDE 10 MG/ML
40 INJECTION INTRAMUSCULAR; INTRAVENOUS EVERY 12 HOURS
Status: DISCONTINUED | OUTPATIENT
Start: 2020-01-01 | End: 2020-01-01 | Stop reason: HOSPADM

## 2020-01-01 RX ORDER — FUROSEMIDE 20 MG/1
TABLET ORAL
Qty: 90 TABLET | Refills: 0 | Status: SHIPPED | OUTPATIENT
Start: 2020-01-01

## 2020-01-01 RX ORDER — ROCURONIUM BROMIDE 10 MG/ML
INJECTION, SOLUTION INTRAVENOUS AS NEEDED
Status: DISCONTINUED | OUTPATIENT
Start: 2020-01-01 | End: 2020-01-01 | Stop reason: SURG

## 2020-01-01 RX ORDER — SODIUM CHLORIDE 0.9 % (FLUSH) 0.9 %
10 SYRINGE (ML) INJECTION AS NEEDED
Status: DISCONTINUED | OUTPATIENT
Start: 2020-01-01 | End: 2020-01-01

## 2020-01-01 RX ORDER — EPHEDRINE SULFATE 50 MG/ML
5 INJECTION, SOLUTION INTRAVENOUS ONCE AS NEEDED
Status: DISCONTINUED | OUTPATIENT
Start: 2020-01-01 | End: 2020-01-01 | Stop reason: HOSPADM

## 2020-01-01 RX ORDER — MORPHINE SULFATE 20 MG/ML
10 SOLUTION ORAL
Status: DISCONTINUED | OUTPATIENT
Start: 2020-01-01 | End: 2020-01-01 | Stop reason: HOSPADM

## 2020-01-01 RX ORDER — FUROSEMIDE 20 MG/1
TABLET ORAL
Qty: 90 TABLET | Refills: 0 | Status: SHIPPED | OUTPATIENT
Start: 2020-01-01 | End: 2020-01-01

## 2020-01-01 RX ORDER — ACETAMINOPHEN 325 MG/1
650 TABLET ORAL EVERY 4 HOURS PRN
Status: DISCONTINUED | OUTPATIENT
Start: 2020-01-01 | End: 2020-01-01 | Stop reason: HOSPADM

## 2020-01-01 RX ORDER — ONDANSETRON 2 MG/ML
INJECTION INTRAMUSCULAR; INTRAVENOUS AS NEEDED
Status: DISCONTINUED | OUTPATIENT
Start: 2020-01-01 | End: 2020-01-01 | Stop reason: SURG

## 2020-01-01 RX ORDER — FENTANYL CITRATE 50 UG/ML
12.5 INJECTION, SOLUTION INTRAMUSCULAR; INTRAVENOUS
Status: DISCONTINUED | OUTPATIENT
Start: 2020-01-01 | End: 2020-01-01 | Stop reason: HOSPADM

## 2020-01-01 RX ORDER — CEFAZOLIN SODIUM 2 G/100ML
2 INJECTION, SOLUTION INTRAVENOUS ONCE
Status: CANCELLED | OUTPATIENT
Start: 2020-01-01 | End: 2020-01-01

## 2020-01-01 RX ORDER — MORPHINE SULFATE 20 MG/ML
5 SOLUTION ORAL
Status: DISCONTINUED | OUTPATIENT
Start: 2020-01-01 | End: 2020-01-01 | Stop reason: HOSPADM

## 2020-01-01 RX ORDER — FUROSEMIDE 10 MG/ML
40 INJECTION INTRAMUSCULAR; INTRAVENOUS DAILY
Status: DISCONTINUED | OUTPATIENT
Start: 2020-01-01 | End: 2020-01-01

## 2020-01-01 RX ORDER — CEPHALEXIN 250 MG/1
250 CAPSULE ORAL 3 TIMES DAILY
Qty: 15 CAPSULE | Refills: 0 | Status: SHIPPED | OUTPATIENT
Start: 2020-01-01 | End: 2020-01-01

## 2020-01-01 RX ORDER — HYDROCODONE BITARTRATE AND ACETAMINOPHEN 7.5; 325 MG/1; MG/1
TABLET ORAL
Qty: 5 TABLET | Refills: 0 | Status: SHIPPED | OUTPATIENT
Start: 2020-01-01 | End: 2020-01-01

## 2020-01-01 RX ORDER — ONDANSETRON 2 MG/ML
4 INJECTION INTRAMUSCULAR; INTRAVENOUS ONCE
Status: COMPLETED | OUTPATIENT
Start: 2020-01-01 | End: 2020-01-01

## 2020-01-01 RX ORDER — SODIUM CHLORIDE 0.9 % (FLUSH) 0.9 %
3 SYRINGE (ML) INJECTION EVERY 12 HOURS SCHEDULED
Status: DISCONTINUED | OUTPATIENT
Start: 2020-01-01 | End: 2020-01-01 | Stop reason: HOSPADM

## 2020-01-01 RX ORDER — LIDOCAINE HYDROCHLORIDE 20 MG/ML
INJECTION, SOLUTION INFILTRATION; PERINEURAL AS NEEDED
Status: DISCONTINUED | OUTPATIENT
Start: 2020-01-01 | End: 2020-01-01 | Stop reason: SURG

## 2020-01-01 RX ORDER — SODIUM CHLORIDE 0.9 % (FLUSH) 0.9 %
10 SYRINGE (ML) INJECTION AS NEEDED
Status: DISCONTINUED | OUTPATIENT
Start: 2020-01-01 | End: 2020-01-01 | Stop reason: HOSPADM

## 2020-01-01 RX ORDER — MORPHINE SULFATE 4 MG/ML
4 INJECTION, SOLUTION INTRAMUSCULAR; INTRAVENOUS EVERY 4 HOURS PRN
Status: DISCONTINUED | OUTPATIENT
Start: 2020-01-01 | End: 2020-01-01 | Stop reason: HOSPADM

## 2020-01-01 RX ORDER — NIFEDIPINE 60 MG/1
TABLET, EXTENDED RELEASE ORAL
Qty: 90 TABLET | Refills: 0 | Status: SHIPPED | OUTPATIENT
Start: 2020-01-01

## 2020-01-01 RX ORDER — FENTANYL CITRATE 50 UG/ML
50 INJECTION, SOLUTION INTRAMUSCULAR; INTRAVENOUS
Status: DISCONTINUED | OUTPATIENT
Start: 2020-01-01 | End: 2020-01-01 | Stop reason: HOSPADM

## 2020-01-01 RX ORDER — LORAZEPAM 2 MG/ML
2 CONCENTRATE ORAL
Status: DISCONTINUED | OUTPATIENT
Start: 2020-01-01 | End: 2020-01-01 | Stop reason: HOSPADM

## 2020-01-01 RX ORDER — ACETAMINOPHEN 160 MG/5ML
650 SOLUTION ORAL EVERY 4 HOURS PRN
Status: DISCONTINUED | OUTPATIENT
Start: 2020-01-01 | End: 2020-01-01

## 2020-01-01 RX ORDER — SODIUM CHLORIDE, SODIUM LACTATE, POTASSIUM CHLORIDE, CALCIUM CHLORIDE 600; 310; 30; 20 MG/100ML; MG/100ML; MG/100ML; MG/100ML
9 INJECTION, SOLUTION INTRAVENOUS CONTINUOUS
Status: DISCONTINUED | OUTPATIENT
Start: 2020-01-01 | End: 2020-01-01 | Stop reason: HOSPADM

## 2020-01-01 RX ORDER — IPRATROPIUM BROMIDE AND ALBUTEROL SULFATE 2.5; .5 MG/3ML; MG/3ML
3 SOLUTION RESPIRATORY (INHALATION) EVERY 4 HOURS PRN
Status: DISCONTINUED | OUTPATIENT
Start: 2020-01-01 | End: 2020-01-01 | Stop reason: HOSPADM

## 2020-01-01 RX ORDER — ACETAMINOPHEN 325 MG/1
650 TABLET ORAL EVERY 4 HOURS PRN
Status: DISCONTINUED | OUTPATIENT
Start: 2020-01-01 | End: 2020-01-01

## 2020-01-01 RX ORDER — LORAZEPAM 2 MG/ML
0.5 CONCENTRATE ORAL
Status: DISCONTINUED | OUTPATIENT
Start: 2020-01-01 | End: 2020-01-01 | Stop reason: HOSPADM

## 2020-01-01 RX ORDER — ACETAMINOPHEN 325 MG/1
650 TABLET ORAL ONCE AS NEEDED
Status: DISCONTINUED | OUTPATIENT
Start: 2020-01-01 | End: 2020-01-01 | Stop reason: HOSPADM

## 2020-01-01 RX ORDER — ONDANSETRON 2 MG/ML
4 INJECTION INTRAMUSCULAR; INTRAVENOUS ONCE AS NEEDED
Status: DISCONTINUED | OUTPATIENT
Start: 2020-01-01 | End: 2020-01-01 | Stop reason: HOSPADM

## 2020-01-01 RX ORDER — CIPROFLOXACIN 500 MG/1
500 TABLET, FILM COATED ORAL 2 TIMES DAILY
Qty: 28 TABLET | Refills: 0 | Status: SHIPPED | OUTPATIENT
Start: 2020-01-01 | End: 2020-01-01

## 2020-01-01 RX ORDER — DIPHENOXYLATE HYDROCHLORIDE AND ATROPINE SULFATE 2.5; .025 MG/1; MG/1
1 TABLET ORAL
Status: DISCONTINUED | OUTPATIENT
Start: 2020-01-01 | End: 2020-01-01 | Stop reason: HOSPADM

## 2020-01-01 RX ORDER — DIPHENHYDRAMINE HCL 25 MG
25 CAPSULE ORAL
Status: DISCONTINUED | OUTPATIENT
Start: 2020-01-01 | End: 2020-01-01 | Stop reason: HOSPADM

## 2020-01-01 RX ORDER — LIDOCAINE HYDROCHLORIDE 10 MG/ML
0.5 INJECTION, SOLUTION EPIDURAL; INFILTRATION; INTRACAUDAL; PERINEURAL ONCE AS NEEDED
Status: DISCONTINUED | OUTPATIENT
Start: 2020-01-01 | End: 2020-01-01 | Stop reason: HOSPADM

## 2020-01-01 RX ORDER — FLUMAZENIL 0.1 MG/ML
0.2 INJECTION INTRAVENOUS AS NEEDED
Status: DISCONTINUED | OUTPATIENT
Start: 2020-01-01 | End: 2020-01-01 | Stop reason: HOSPADM

## 2020-01-01 RX ORDER — NIFEDIPINE 60 MG/1
60 TABLET, EXTENDED RELEASE ORAL
Status: DISCONTINUED | OUTPATIENT
Start: 2020-01-01 | End: 2020-01-01

## 2020-01-01 RX ORDER — AMOXICILLIN 875 MG/1
875 TABLET, COATED ORAL 2 TIMES DAILY
Qty: 20 TABLET | Refills: 0 | Status: SHIPPED | OUTPATIENT
Start: 2020-01-01 | End: 2020-01-01

## 2020-01-01 RX ORDER — ACETAMINOPHEN 160 MG/5ML
650 SOLUTION ORAL EVERY 4 HOURS PRN
Status: DISCONTINUED | OUTPATIENT
Start: 2020-01-01 | End: 2020-01-01 | Stop reason: HOSPADM

## 2020-01-01 RX ORDER — IPRATROPIUM BROMIDE AND ALBUTEROL SULFATE 2.5; .5 MG/3ML; MG/3ML
3 SOLUTION RESPIRATORY (INHALATION) ONCE
Status: COMPLETED | OUTPATIENT
Start: 2020-01-01 | End: 2020-01-01

## 2020-01-01 RX ORDER — HYDROMORPHONE HYDROCHLORIDE 1 MG/ML
0.25 INJECTION, SOLUTION INTRAMUSCULAR; INTRAVENOUS; SUBCUTANEOUS
Status: DISCONTINUED | OUTPATIENT
Start: 2020-01-01 | End: 2020-01-01 | Stop reason: HOSPADM

## 2020-01-01 RX ORDER — MONTELUKAST SODIUM 10 MG/1
10 TABLET ORAL NIGHTLY
Status: DISCONTINUED | OUTPATIENT
Start: 2020-01-01 | End: 2020-01-01 | Stop reason: HOSPADM

## 2020-01-01 RX ORDER — MAGNESIUM HYDROXIDE 1200 MG/15ML
LIQUID ORAL AS NEEDED
Status: DISCONTINUED | OUTPATIENT
Start: 2020-01-01 | End: 2020-01-01 | Stop reason: HOSPADM

## 2020-01-01 RX ORDER — NALOXONE HCL 0.4 MG/ML
0.2 VIAL (ML) INJECTION AS NEEDED
Status: DISCONTINUED | OUTPATIENT
Start: 2020-01-01 | End: 2020-01-01 | Stop reason: HOSPADM

## 2020-01-01 RX ORDER — CEPHALEXIN 250 MG/1
250 CAPSULE ORAL 4 TIMES DAILY
Qty: 40 CAPSULE | Refills: 0 | Status: SHIPPED | OUTPATIENT
Start: 2020-01-01 | End: 2020-01-01

## 2020-01-01 RX ORDER — DEXAMETHASONE SODIUM PHOSPHATE 10 MG/ML
INJECTION INTRAMUSCULAR; INTRAVENOUS AS NEEDED
Status: DISCONTINUED | OUTPATIENT
Start: 2020-01-01 | End: 2020-01-01 | Stop reason: SURG

## 2020-01-01 RX ORDER — ACETAMINOPHEN 500 MG
1000 TABLET ORAL EVERY 6 HOURS PRN
COMMUNITY

## 2020-01-01 RX ORDER — LORAZEPAM 0.5 MG/1
TABLET ORAL
Qty: 5 TABLET | Refills: 0 | Status: SHIPPED | OUTPATIENT
Start: 2020-01-01

## 2020-01-01 RX ORDER — FOLIC ACID 1 MG/1
1 TABLET ORAL DAILY
Status: DISCONTINUED | OUTPATIENT
Start: 2020-01-01 | End: 2020-01-01 | Stop reason: HOSPADM

## 2020-01-01 RX ORDER — HYDROCODONE BITARTRATE AND ACETAMINOPHEN 7.5; 325 MG/1; MG/1
1 TABLET ORAL EVERY 6 HOURS PRN
Status: DISCONTINUED | OUTPATIENT
Start: 2020-01-01 | End: 2020-01-01 | Stop reason: HOSPADM

## 2020-01-01 RX ORDER — CALCIUM CARBONATE 200(500)MG
2 TABLET,CHEWABLE ORAL 2 TIMES DAILY PRN
Status: DISCONTINUED | OUTPATIENT
Start: 2020-01-01 | End: 2020-01-01 | Stop reason: HOSPADM

## 2020-01-01 RX ORDER — LORAZEPAM 2 MG/ML
2 INJECTION INTRAMUSCULAR
Status: DISCONTINUED | OUTPATIENT
Start: 2020-01-01 | End: 2020-01-01 | Stop reason: HOSPADM

## 2020-01-01 RX ORDER — FOLIC ACID 1 MG/1
1 TABLET ORAL DAILY
COMMUNITY
Start: 2019-01-01

## 2020-01-01 RX ORDER — SODIUM CHLORIDE 0.9 % (FLUSH) 0.9 %
1-10 SYRINGE (ML) INJECTION AS NEEDED
Status: DISCONTINUED | OUTPATIENT
Start: 2020-01-01 | End: 2020-01-01 | Stop reason: HOSPADM

## 2020-01-01 RX ORDER — PROPOFOL 10 MG/ML
VIAL (ML) INTRAVENOUS AS NEEDED
Status: DISCONTINUED | OUTPATIENT
Start: 2020-01-01 | End: 2020-01-01 | Stop reason: SURG

## 2020-01-01 RX ORDER — ACETAMINOPHEN 650 MG/1
650 SUPPOSITORY RECTAL EVERY 4 HOURS PRN
Status: DISCONTINUED | OUTPATIENT
Start: 2020-01-01 | End: 2020-01-01

## 2020-01-01 RX ORDER — AMOXICILLIN 875 MG/1
875 TABLET, COATED ORAL 2 TIMES DAILY
Qty: 14 TABLET | Refills: 0 | Status: SHIPPED | OUTPATIENT
Start: 2020-01-01 | End: 2020-01-01 | Stop reason: SDUPTHER

## 2020-01-01 RX ORDER — METHYLPREDNISOLONE 4 MG/1
TABLET ORAL
Qty: 21 TABLET | Refills: 0 | Status: SHIPPED | OUTPATIENT
Start: 2020-01-01 | End: 2020-01-01

## 2020-01-01 RX ORDER — ACETAMINOPHEN 650 MG/1
650 SUPPOSITORY RECTAL EVERY 4 HOURS PRN
Status: DISCONTINUED | OUTPATIENT
Start: 2020-01-01 | End: 2020-01-01 | Stop reason: HOSPADM

## 2020-01-01 RX ORDER — DIPHENHYDRAMINE HYDROCHLORIDE 50 MG/ML
12.5 INJECTION INTRAMUSCULAR; INTRAVENOUS
Status: DISCONTINUED | OUTPATIENT
Start: 2020-01-01 | End: 2020-01-01 | Stop reason: HOSPADM

## 2020-01-01 RX ORDER — HYDROCODONE BITARTRATE AND ACETAMINOPHEN 7.5; 325 MG/1; MG/1
1 TABLET ORAL ONCE AS NEEDED
Status: COMPLETED | OUTPATIENT
Start: 2020-01-01 | End: 2020-01-01

## 2020-01-01 RX ORDER — BENZONATATE 100 MG/1
100 CAPSULE ORAL 3 TIMES DAILY PRN
Qty: 30 CAPSULE | Refills: 0 | Status: SHIPPED | OUTPATIENT
Start: 2020-01-01 | End: 2020-01-01

## 2020-01-01 RX ORDER — OXYCODONE AND ACETAMINOPHEN 7.5; 325 MG/1; MG/1
1 TABLET ORAL ONCE AS NEEDED
Status: DISCONTINUED | OUTPATIENT
Start: 2020-01-01 | End: 2020-01-01 | Stop reason: HOSPADM

## 2020-01-01 RX ORDER — SCOLOPAMINE TRANSDERMAL SYSTEM 1 MG/1
1 PATCH, EXTENDED RELEASE TRANSDERMAL
Status: DISCONTINUED | OUTPATIENT
Start: 2020-01-01 | End: 2020-01-01 | Stop reason: HOSPADM

## 2020-01-01 RX ORDER — LORAZEPAM 2 MG/ML
1 CONCENTRATE ORAL
Status: DISCONTINUED | OUTPATIENT
Start: 2020-01-01 | End: 2020-01-01 | Stop reason: HOSPADM

## 2020-01-01 RX ORDER — NALOXONE HCL 0.4 MG/ML
0.1 VIAL (ML) INJECTION
Status: DISCONTINUED | OUTPATIENT
Start: 2020-01-01 | End: 2020-01-01

## 2020-01-01 RX ORDER — MORPHINE SULFATE IN 0.9 % NACL 50 MG/50ML
PATIENT CONTROLLED ANALGESIA SYRINGE INTRAVENOUS CONTINUOUS
Status: DISCONTINUED | OUTPATIENT
Start: 2020-01-01 | End: 2020-01-01

## 2020-01-01 RX ORDER — ONDANSETRON 4 MG/1
4 TABLET, FILM COATED ORAL EVERY 6 HOURS PRN
Status: DISCONTINUED | OUTPATIENT
Start: 2020-01-01 | End: 2020-01-01

## 2020-01-01 RX ORDER — BISACODYL 5 MG/1
5 TABLET, DELAYED RELEASE ORAL DAILY PRN
Status: DISCONTINUED | OUTPATIENT
Start: 2020-01-01 | End: 2020-01-01 | Stop reason: HOSPADM

## 2020-01-01 RX ORDER — SODIUM CHLORIDE 0.9 % (FLUSH) 0.9 %
3-10 SYRINGE (ML) INJECTION AS NEEDED
Status: CANCELLED | OUTPATIENT
Start: 2020-01-01

## 2020-01-01 RX ORDER — NIFEDIPINE 60 MG/1
60 TABLET, EXTENDED RELEASE ORAL 2 TIMES DAILY
Status: DISCONTINUED | OUTPATIENT
Start: 2020-01-01 | End: 2020-01-01

## 2020-01-01 RX ORDER — HYDROMORPHONE HYDROCHLORIDE 1 MG/ML
0.5 INJECTION, SOLUTION INTRAMUSCULAR; INTRAVENOUS; SUBCUTANEOUS
Status: DISCONTINUED | OUTPATIENT
Start: 2020-01-01 | End: 2020-01-01 | Stop reason: HOSPADM

## 2020-01-01 RX ORDER — BUDESONIDE AND FORMOTEROL FUMARATE DIHYDRATE 160; 4.5 UG/1; UG/1
2 AEROSOL RESPIRATORY (INHALATION)
Status: DISCONTINUED | OUTPATIENT
Start: 2020-01-01 | End: 2020-01-01 | Stop reason: HOSPADM

## 2020-01-01 RX ORDER — GUAIFENESIN 600 MG/1
1200 TABLET, EXTENDED RELEASE ORAL 2 TIMES DAILY
Status: DISCONTINUED | OUTPATIENT
Start: 2020-01-01 | End: 2020-01-01 | Stop reason: HOSPADM

## 2020-01-01 RX ORDER — LORAZEPAM 0.5 MG/1
TABLET ORAL
Qty: 2 TABLET | Refills: 0 | Status: SHIPPED | OUTPATIENT
Start: 2020-01-01 | End: 2020-01-01 | Stop reason: SDUPTHER

## 2020-01-01 RX ORDER — MORPHINE SULFATE 2 MG/ML
2 INJECTION, SOLUTION INTRAMUSCULAR; INTRAVENOUS
Status: DISCONTINUED | OUTPATIENT
Start: 2020-01-01 | End: 2020-01-01 | Stop reason: HOSPADM

## 2020-01-01 RX ORDER — LORAZEPAM 2 MG/ML
0.5 INJECTION INTRAMUSCULAR
Status: DISCONTINUED | OUTPATIENT
Start: 2020-01-01 | End: 2020-01-01 | Stop reason: HOSPADM

## 2020-01-01 RX ORDER — ONDANSETRON 2 MG/ML
4 INJECTION INTRAMUSCULAR; INTRAVENOUS EVERY 6 HOURS PRN
Status: DISCONTINUED | OUTPATIENT
Start: 2020-01-01 | End: 2020-01-01 | Stop reason: HOSPADM

## 2020-01-01 RX ORDER — FUROSEMIDE 10 MG/ML
40 INJECTION INTRAMUSCULAR; INTRAVENOUS ONCE
Status: COMPLETED | OUTPATIENT
Start: 2020-01-01 | End: 2020-01-01

## 2020-01-01 RX ORDER — IPRATROPIUM BROMIDE AND ALBUTEROL SULFATE 2.5; .5 MG/3ML; MG/3ML
3 SOLUTION RESPIRATORY (INHALATION)
Status: DISCONTINUED | OUTPATIENT
Start: 2020-01-01 | End: 2020-01-01 | Stop reason: HOSPADM

## 2020-01-01 RX ORDER — HYDROCODONE BITARTRATE AND ACETAMINOPHEN 5; 325 MG/1; MG/1
1 TABLET ORAL EVERY 4 HOURS PRN
Status: DISCONTINUED | OUTPATIENT
Start: 2020-01-01 | End: 2020-01-01 | Stop reason: HOSPADM

## 2020-01-01 RX ORDER — SODIUM CHLORIDE 0.9 % (FLUSH) 0.9 %
3-10 SYRINGE (ML) INJECTION AS NEEDED
Status: DISCONTINUED | OUTPATIENT
Start: 2020-01-01 | End: 2020-01-01 | Stop reason: HOSPADM

## 2020-01-01 RX ORDER — SODIUM CHLORIDE, SODIUM LACTATE, POTASSIUM CHLORIDE, CALCIUM CHLORIDE 600; 310; 30; 20 MG/100ML; MG/100ML; MG/100ML; MG/100ML
100 INJECTION, SOLUTION INTRAVENOUS CONTINUOUS
Status: DISCONTINUED | OUTPATIENT
Start: 2020-01-01 | End: 2020-01-01 | Stop reason: HOSPADM

## 2020-01-01 RX ORDER — MORPHINE SULFATE 4 MG/ML
4 INJECTION, SOLUTION INTRAMUSCULAR; INTRAVENOUS
Status: DISCONTINUED | OUTPATIENT
Start: 2020-01-01 | End: 2020-01-01 | Stop reason: HOSPADM

## 2020-01-01 RX ORDER — FLUTICASONE PROPIONATE 50 MCG
1 SPRAY, SUSPENSION (ML) NASAL DAILY
Status: DISCONTINUED | OUTPATIENT
Start: 2020-01-01 | End: 2020-01-01 | Stop reason: HOSPADM

## 2020-01-01 RX ORDER — BUPIVACAINE HYDROCHLORIDE 2.5 MG/ML
INJECTION, SOLUTION INFILTRATION; PERINEURAL AS NEEDED
Status: DISCONTINUED | OUTPATIENT
Start: 2020-01-01 | End: 2020-01-01 | Stop reason: HOSPADM

## 2020-01-01 RX ORDER — LIDOCAINE HYDROCHLORIDE 10 MG/ML
10 INJECTION, SOLUTION INFILTRATION; PERINEURAL ONCE
Status: COMPLETED | OUTPATIENT
Start: 2020-01-01 | End: 2020-01-01

## 2020-01-01 RX ORDER — GLYCOPYRROLATE 0.2 MG/ML
INJECTION INTRAMUSCULAR; INTRAVENOUS AS NEEDED
Status: DISCONTINUED | OUTPATIENT
Start: 2020-01-01 | End: 2020-01-01 | Stop reason: SURG

## 2020-01-01 RX ORDER — HYDRALAZINE HYDROCHLORIDE 20 MG/ML
5 INJECTION INTRAMUSCULAR; INTRAVENOUS
Status: DISCONTINUED | OUTPATIENT
Start: 2020-01-01 | End: 2020-01-01 | Stop reason: HOSPADM

## 2020-01-01 RX ORDER — HYDROCODONE BITARTRATE AND ACETAMINOPHEN 5; 325 MG/1; MG/1
1 TABLET ORAL ONCE
Status: COMPLETED | OUTPATIENT
Start: 2020-01-01 | End: 2020-01-01

## 2020-01-01 RX ORDER — FAMOTIDINE 10 MG/ML
20 INJECTION, SOLUTION INTRAVENOUS ONCE
Status: COMPLETED | OUTPATIENT
Start: 2020-01-01 | End: 2020-01-01

## 2020-01-01 RX ORDER — MORPHINE SULFATE 2 MG/ML
2 INJECTION, SOLUTION INTRAMUSCULAR; INTRAVENOUS ONCE
Status: COMPLETED | OUTPATIENT
Start: 2020-01-01 | End: 2020-01-01

## 2020-01-01 RX ORDER — NIFEDIPINE 60 MG/1
TABLET, EXTENDED RELEASE ORAL
Qty: 90 TABLET | Refills: 0 | Status: SHIPPED | OUTPATIENT
Start: 2020-01-01 | End: 2020-01-01

## 2020-01-01 RX ORDER — DOXYCYCLINE HYCLATE 100 MG
100 TABLET ORAL 2 TIMES DAILY
Qty: 20 TABLET | Refills: 0 | Status: SHIPPED | OUTPATIENT
Start: 2020-01-01 | End: 2020-01-01

## 2020-01-01 RX ORDER — LORAZEPAM 2 MG/ML
1 INJECTION INTRAMUSCULAR
Status: DISCONTINUED | OUTPATIENT
Start: 2020-01-01 | End: 2020-01-01 | Stop reason: HOSPADM

## 2020-01-01 RX ORDER — DEXAMETHASONE 4 MG/1
8 TABLET ORAL TAKE AS DIRECTED
Qty: 2 TABLET | Refills: 0 | Status: SHIPPED | OUTPATIENT
Start: 2020-01-01 | End: 2020-01-01 | Stop reason: HOSPADM

## 2020-01-01 RX ADMIN — PHENYLEPHRINE HYDROCHLORIDE 100 MCG: 10 INJECTION INTRAVENOUS at 15:16

## 2020-01-01 RX ADMIN — IOPAMIDOL 75 ML: 612 INJECTION, SOLUTION INTRAVENOUS at 09:47

## 2020-01-01 RX ADMIN — PHENYLEPHRINE HYDROCHLORIDE 100 MCG: 10 INJECTION INTRAVENOUS at 14:36

## 2020-01-01 RX ADMIN — FOLIC ACID 1 MG: 1 TABLET ORAL at 09:21

## 2020-01-01 RX ADMIN — FLUTICASONE PROPIONATE 1 SPRAY: 50 SPRAY, METERED NASAL at 09:19

## 2020-01-01 RX ADMIN — LORAZEPAM 1 MG: 2 INJECTION INTRAMUSCULAR; INTRAVENOUS at 16:24

## 2020-01-01 RX ADMIN — HYDROCODONE BITARTRATE AND ACETAMINOPHEN 1 TABLET: 7.5; 325 TABLET ORAL at 22:50

## 2020-01-01 RX ADMIN — ROCURONIUM BROMIDE 50 MG: 10 INJECTION INTRAVENOUS at 14:27

## 2020-01-01 RX ADMIN — LIDOCAINE HYDROCHLORIDE 60 MG: 20 INJECTION, SOLUTION INFILTRATION; PERINEURAL at 14:27

## 2020-01-01 RX ADMIN — BUDESONIDE AND FORMOTEROL FUMARATE DIHYDRATE 2 PUFF: 160; 4.5 AEROSOL RESPIRATORY (INHALATION) at 09:03

## 2020-01-01 RX ADMIN — PHENYLEPHRINE HYDROCHLORIDE 100 MCG: 10 INJECTION INTRAVENOUS at 14:33

## 2020-01-01 RX ADMIN — HYDROCODONE BITARTRATE AND ACETAMINOPHEN 1 TABLET: 5; 325 TABLET ORAL at 05:14

## 2020-01-01 RX ADMIN — HYDROCODONE BITARTRATE AND ACETAMINOPHEN 1 TABLET: 7.5; 325 TABLET ORAL at 05:57

## 2020-01-01 RX ADMIN — LORAZEPAM 0.5 MG: 2 INJECTION INTRAMUSCULAR; INTRAVENOUS at 14:42

## 2020-01-01 RX ADMIN — FENTANYL CITRATE 12.5 MCG: 50 INJECTION, SOLUTION INTRAMUSCULAR; INTRAVENOUS at 16:45

## 2020-01-01 RX ADMIN — FENTANYL CITRATE 12.5 MCG: 50 INJECTION, SOLUTION INTRAMUSCULAR; INTRAVENOUS at 16:25

## 2020-01-01 RX ADMIN — HYDROCODONE BITARTRATE AND ACETAMINOPHEN 1 TABLET: 7.5; 325 TABLET ORAL at 17:01

## 2020-01-01 RX ADMIN — GUAIFENESIN 1200 MG: 600 TABLET, EXTENDED RELEASE ORAL at 10:00

## 2020-01-01 RX ADMIN — MONTELUKAST SODIUM 10 MG: 10 TABLET, FILM COATED ORAL at 21:05

## 2020-01-01 RX ADMIN — HYDROMORPHONE HYDROCHLORIDE 0.25 MG: 1 INJECTION, SOLUTION INTRAMUSCULAR; INTRAVENOUS; SUBCUTANEOUS at 17:03

## 2020-01-01 RX ADMIN — BARIUM SULFATE 55 ML: 0.81 POWDER, FOR SUSPENSION ORAL at 08:34

## 2020-01-01 RX ADMIN — BARIUM SULFATE 50 ML: 400 SUSPENSION ORAL at 08:34

## 2020-01-01 RX ADMIN — IPRATROPIUM BROMIDE AND ALBUTEROL SULFATE 3 ML: 2.5; .5 SOLUTION RESPIRATORY (INHALATION) at 15:46

## 2020-01-01 RX ADMIN — FUROSEMIDE 40 MG: 10 INJECTION, SOLUTION INTRAMUSCULAR; INTRAVENOUS at 21:05

## 2020-01-01 RX ADMIN — SUGAMMADEX 300 MG: 100 INJECTION, SOLUTION INTRAVENOUS at 15:37

## 2020-01-01 RX ADMIN — SODIUM CHLORIDE, PRESERVATIVE FREE 10 ML: 5 INJECTION INTRAVENOUS at 22:13

## 2020-01-01 RX ADMIN — CEFAZOLIN SODIUM 2 G: 2 INJECTION, SOLUTION INTRAVENOUS at 14:17

## 2020-01-01 RX ADMIN — MORPHINE SULFATE 2 MG: 2 INJECTION, SOLUTION INTRAMUSCULAR; INTRAVENOUS at 01:36

## 2020-01-01 RX ADMIN — GUAIFENESIN 1200 MG: 600 TABLET, EXTENDED RELEASE ORAL at 09:21

## 2020-01-01 RX ADMIN — FAMOTIDINE 20 MG: 10 INJECTION INTRAVENOUS at 11:35

## 2020-01-01 RX ADMIN — SODIUM CHLORIDE, PRESERVATIVE FREE 10 ML: 5 INJECTION INTRAVENOUS at 05:16

## 2020-01-01 RX ADMIN — BUDESONIDE AND FORMOTEROL FUMARATE DIHYDRATE 2 PUFF: 160; 4.5 AEROSOL RESPIRATORY (INHALATION) at 20:10

## 2020-01-01 RX ADMIN — HYDROCODONE BITARTRATE AND ACETAMINOPHEN 1 TABLET: 7.5; 325 TABLET ORAL at 12:40

## 2020-01-01 RX ADMIN — HYDROCODONE BITARTRATE AND ACETAMINOPHEN 1 TABLET: 7.5; 325 TABLET ORAL at 00:17

## 2020-01-01 RX ADMIN — FUROSEMIDE 40 MG: 10 INJECTION, SOLUTION INTRAMUSCULAR; INTRAVENOUS at 05:50

## 2020-01-01 RX ADMIN — LIDOCAINE HYDROCHLORIDE 3 ML: 10 INJECTION, SOLUTION INFILTRATION; PERINEURAL at 07:29

## 2020-01-01 RX ADMIN — LORAZEPAM 1 MG: 2 INJECTION INTRAMUSCULAR; INTRAVENOUS at 04:40

## 2020-01-01 RX ADMIN — GLYCOPYRROLATE 0.2 MG: 0.2 INJECTION INTRAMUSCULAR; INTRAVENOUS at 15:17

## 2020-01-01 RX ADMIN — PHENYLEPHRINE HYDROCHLORIDE 100 MCG: 10 INJECTION INTRAVENOUS at 14:45

## 2020-01-01 RX ADMIN — MORPHINE SULFATE 4 MG: 4 INJECTION, SOLUTION INTRAMUSCULAR; INTRAVENOUS at 10:30

## 2020-01-01 RX ADMIN — FLUTICASONE PROPIONATE 1 SPRAY: 50 SPRAY, METERED NASAL at 10:13

## 2020-01-01 RX ADMIN — FENTANYL CITRATE 50 MCG: 50 INJECTION, SOLUTION INTRAMUSCULAR; INTRAVENOUS at 13:27

## 2020-01-01 RX ADMIN — SODIUM CHLORIDE, PRESERVATIVE FREE 10 ML: 5 INJECTION INTRAVENOUS at 22:51

## 2020-01-01 RX ADMIN — FOLIC ACID 1 MG: 1 TABLET ORAL at 10:00

## 2020-01-01 RX ADMIN — BUDESONIDE AND FORMOTEROL FUMARATE DIHYDRATE 2 PUFF: 160; 4.5 AEROSOL RESPIRATORY (INHALATION) at 15:01

## 2020-01-01 RX ADMIN — MORPHINE SULFATE 4 MG: 4 INJECTION, SOLUTION INTRAMUSCULAR; INTRAVENOUS at 16:25

## 2020-01-01 RX ADMIN — DEXAMETHASONE SODIUM PHOSPHATE 6 MG: 10 INJECTION INTRAMUSCULAR; INTRAVENOUS at 15:00

## 2020-01-01 RX ADMIN — FENTANYL CITRATE 12.5 MCG: 50 INJECTION, SOLUTION INTRAMUSCULAR; INTRAVENOUS at 16:55

## 2020-01-01 RX ADMIN — GUAIFENESIN 1200 MG: 600 TABLET, EXTENDED RELEASE ORAL at 21:05

## 2020-01-01 RX ADMIN — LORAZEPAM 1 MG: 2 INJECTION INTRAMUSCULAR; INTRAVENOUS at 00:33

## 2020-01-01 RX ADMIN — PROPOFOL 100 MG: 10 INJECTION, EMULSION INTRAVENOUS at 14:27

## 2020-01-01 RX ADMIN — LORAZEPAM 1 MG: 2 INJECTION INTRAMUSCULAR; INTRAVENOUS at 20:25

## 2020-01-01 RX ADMIN — MORPHINE SULFATE 4 MG: 4 INJECTION, SOLUTION INTRAMUSCULAR; INTRAVENOUS at 13:29

## 2020-01-01 RX ADMIN — IPRATROPIUM BROMIDE AND ALBUTEROL SULFATE 3 ML: 2.5; .5 SOLUTION RESPIRATORY (INHALATION) at 11:02

## 2020-01-01 RX ADMIN — MIDAZOLAM 1 MG: 1 INJECTION INTRAMUSCULAR; INTRAVENOUS at 13:27

## 2020-01-01 RX ADMIN — MORPHINE SULFATE 4 MG: 4 INJECTION, SOLUTION INTRAMUSCULAR; INTRAVENOUS at 04:40

## 2020-01-01 RX ADMIN — MORPHINE SULFATE 4 MG: 4 INJECTION, SOLUTION INTRAMUSCULAR; INTRAVENOUS at 20:25

## 2020-01-01 RX ADMIN — IPRATROPIUM BROMIDE AND ALBUTEROL SULFATE 3 ML: 2.5; .5 SOLUTION RESPIRATORY (INHALATION) at 20:10

## 2020-01-01 RX ADMIN — FUROSEMIDE 40 MG: 10 INJECTION, SOLUTION INTRAMUSCULAR; INTRAVENOUS at 12:31

## 2020-01-01 RX ADMIN — IPRATROPIUM BROMIDE AND ALBUTEROL SULFATE 3 ML: 2.5; .5 SOLUTION RESPIRATORY (INHALATION) at 11:45

## 2020-01-01 RX ADMIN — FENTANYL CITRATE 12.5 MCG: 50 INJECTION, SOLUTION INTRAMUSCULAR; INTRAVENOUS at 15:58

## 2020-01-01 RX ADMIN — IOPAMIDOL 15 ML: 612 INJECTION, SOLUTION INTRATHECAL at 07:30

## 2020-01-01 RX ADMIN — ONDANSETRON 4 MG: 2 INJECTION INTRAMUSCULAR; INTRAVENOUS at 22:50

## 2020-01-01 RX ADMIN — MORPHINE SULFATE 4 MG: 4 INJECTION, SOLUTION INTRAMUSCULAR; INTRAVENOUS at 21:10

## 2020-01-01 RX ADMIN — BARIUM SULFATE 4 ML: 980 POWDER, FOR SUSPENSION ORAL at 08:34

## 2020-01-01 RX ADMIN — MORPHINE SULFATE 4 MG: 4 INJECTION, SOLUTION INTRAMUSCULAR; INTRAVENOUS at 00:34

## 2020-01-01 RX ADMIN — IPRATROPIUM BROMIDE AND ALBUTEROL SULFATE 3 ML: .5; 3 SOLUTION RESPIRATORY (INHALATION) at 08:12

## 2020-01-01 RX ADMIN — ENOXAPARIN SODIUM 40 MG: 40 INJECTION SUBCUTANEOUS at 12:31

## 2020-01-01 RX ADMIN — MORPHINE SULFATE 4 MG: 4 INJECTION, SOLUTION INTRAMUSCULAR; INTRAVENOUS at 09:16

## 2020-01-01 RX ADMIN — SODIUM CHLORIDE, POTASSIUM CHLORIDE, SODIUM LACTATE AND CALCIUM CHLORIDE 9 ML/HR: 600; 310; 30; 20 INJECTION, SOLUTION INTRAVENOUS at 11:35

## 2020-01-01 RX ADMIN — FUROSEMIDE 20 MG: 20 TABLET ORAL at 10:00

## 2020-01-01 RX ADMIN — PHENYLEPHRINE HYDROCHLORIDE 100 MCG: 10 INJECTION INTRAVENOUS at 15:15

## 2020-01-01 RX ADMIN — PHENYLEPHRINE HYDROCHLORIDE 100 MCG: 10 INJECTION INTRAVENOUS at 14:29

## 2020-01-01 RX ADMIN — MIDAZOLAM 1 MG: 1 INJECTION INTRAMUSCULAR; INTRAVENOUS at 11:35

## 2020-01-01 RX ADMIN — ONDANSETRON HYDROCHLORIDE 4 MG: 2 SOLUTION INTRAMUSCULAR; INTRAVENOUS at 15:12

## 2020-01-01 RX ADMIN — FUROSEMIDE 40 MG: 20 INJECTION, SOLUTION INTRAMUSCULAR; INTRAVENOUS at 00:25

## 2020-02-04 PROBLEM — G47.33 OSA (OBSTRUCTIVE SLEEP APNEA): Status: ACTIVE | Noted: 2019-01-01

## 2020-02-04 PROBLEM — E66.01 CLASS 3 SEVERE OBESITY WITH BODY MASS INDEX (BMI) OF 40.0 TO 44.9 IN ADULT (HCC): Status: ACTIVE | Noted: 2019-01-01

## 2020-02-04 NOTE — PROGRESS NOTES
"CC: Progressive weakness arms worse than legs    HPI:  Ryley Vazquez is a  64 y.o.  right-handed white male who was sent for neurological consultation by Dr. Braulio Sorto regarding progressive weakness arms worse than legs.  Patient states a history in the left arm dates back probably greater than 1 year.  The progress has been slowly progressive.  The left arm is now very poorly functioning.  He has trouble picking it up as well as opening the hand.  He has similar symptoms on the right but not as severe.  He states that he does drop things with each hand.  He also notes some weakness in the legs which is not as severe.  He can still ambulate but has trouble getting up.  He has some numbness in the left forearm and hands to some degree.  He states that there is a \"heaviness\" of the torso and legs.    He has previous history of a posterior laminectomy for spinal stenosis apparently by Dr. Aparicio more than 20 years ago at [facility].  Patient had numbness in the hands and feet with that.  The patient also has history of rheumatoid arthritis and is seen by Dr. Mile Doll.    The patient indicates some stiffness of his neck but not a lot of neck pain.  He also has had global numbness with certain head turning a couple of times.    No bowel or bladder trouble.    Past Medical History:   Diagnosis Date   • Arthritis    • Asthmatic bronchitis    • COPD (chronic obstructive pulmonary disease) (CMS/[facility])    • Difficulty walking    • Hypertension    • Movement disorder    • Sinusitis    • Sleep apnea          Past Surgical History:   Procedure Laterality Date   • CERVICAL FUSION N/A 2005   • COLONOSCOPY  2014    polyps   • GASTRIC BYPASS     • HERNIA REPAIR     • ROTATOR CUFF REPAIR Right 1985           Current Outpatient Medications:   •  flunisolide (NASALIDE) 25 MCG/ACT (0.025%) solution nasal spray, 2 SPRAYS, 2 TIMES PER DAY. IN EACH NOSTRIL, Disp: , Rfl: 10  •  folic acid (FOLVITE) 1 MG tablet, , Disp: , Rfl: "   •  furosemide (LASIX) 20 MG tablet, TAKE 1 TABLET BY MOUTH EVERY DAY, Disp: 90 tablet, Rfl: 0  •  methotrexate 2.5 MG tablet, , Disp: , Rfl:   •  mometasone-formoterol (DULERA 200) 200-5 MCG/ACT inhaler, Inhale 2 puffs 2 (Two) Times a Day., Disp: , Rfl:   •  montelukast (SINGULAIR) 10 MG tablet, Take 10 mg by mouth Every Night., Disp: , Rfl:   •  NIFEdipine XL (PROCARDIA XL) 60 MG 24 hr tablet, TAKE 1 TABLET BY MOUTH EVERY DAY, Disp: 90 tablet, Rfl: 0  •  tamsulosin (FLOMAX) 0.4 MG capsule 24 hr capsule, , Disp: , Rfl:   •  VENTOLIN  (90 Base) MCG/ACT inhaler, INHALE TWO PUFFS BY MOUTH EVERY 4 HOURS AS NEEDED FOR WHEEZING, Disp: 1 inhaler, Rfl: 5  •  Acetaminophen (TYLENOL EXTRA STRENGTH PO), Take  by mouth 3 (Three) Times a Day As Needed., Disp: , Rfl:   •  fluticasone (FLONASE) 50 MCG/ACT nasal spray, 2 sprays into each nostril Daily., Disp: , Rfl:   •  Mirabegron ER (MYRBETRIQ) 25 MG tablet sustained-release 24 hour 24 hr tablet, Take 25 mg by mouth Daily., Disp: , Rfl:   •  NON FORMULARY, CPAP, Disp: , Rfl:   •  traZODone (DESYREL) 100 MG tablet, Take 1 tablet by mouth At Night As Needed for Sleep., Disp: 30 tablet, Rfl: 5      Family History   Problem Relation Age of Onset   • Asthma Mother    • Cancer Father         COLON   • Arthritis Father    • Hypertension Father    • Diabetes Father    • Heart disease Brother    • Cancer Other    • Arthritis Other    • Alcohol abuse Other    • Hypertension Maternal Grandmother    • Diabetes Maternal Grandmother    • Stroke Paternal Grandmother    • Hypertension Paternal Grandmother    • Diabetes Paternal Grandmother    • Heart disease Paternal Grandfather          Social History     Socioeconomic History   • Marital status:      Spouse name: Not on file   • Number of children: Not on file   • Years of education: Not on file   • Highest education level: Not on file   Tobacco Use   • Smoking status: Current Every Day Smoker     Packs/day: 1.00     Years:  "14.00     Pack years: 14.00   • Smokeless tobacco: Never Used   Substance and Sexual Activity   • Alcohol use: Yes     Comment: hx:alcohol abuse/ occasional   • Drug use: No         No Known Allergies      Pain Scale: 8/10        ROS:  Review of Systems   Constitutional: Positive for activity change and fatigue. Negative for appetite change.   HENT: Positive for congestion, dental problem, rhinorrhea, sinus pressure and sneezing. Negative for ear pain, facial swelling and hearing loss.    Eyes: Negative for pain, redness and itching.   Respiratory: Positive for apnea, cough, shortness of breath, wheezing and stridor. Negative for choking.    Cardiovascular: Negative for chest pain and leg swelling.   Gastrointestinal: Negative for abdominal pain, diarrhea and vomiting.   Endocrine: Negative for cold intolerance, heat intolerance and polydipsia.   Musculoskeletal: Positive for arthralgias, back pain, joint swelling, myalgias, neck pain and neck stiffness.   Skin: Negative for color change, pallor and rash.   Allergic/Immunologic: Positive for environmental allergies and food allergies.   Neurological: Positive for weakness and numbness. Negative for dizziness, tremors, seizures, syncope, facial asymmetry, speech difficulty, light-headedness and headaches.   Hematological: Does not bruise/bleed easily.   Psychiatric/Behavioral: Negative for agitation, behavioral problems, confusion, decreased concentration, dysphoric mood, hallucinations, self-injury, sleep disturbance and suicidal ideas. The patient is not nervous/anxious and is not hyperactive.          I have reviewed and agree with the above ROS completed by the medical assistant.      Physical Exam:  Vitals:    02/04/20 1256   BP: 134/78   Pulse: 68   SpO2: 98%   Weight: 123 kg (271 lb)   Height: 170.2 cm (67\")     Orthostatic BP:    Body mass index is 42.44 kg/m².    Physical Exam  General: M obese white male no acute distress  HEENT: Normocephalic no evidence " of trauma.  Discs flat.  Some pigmentary changes on the rims.  Some nicking is seen.  Throat negative  Neck: Supple.  No thyromegaly.  Right subclavian bruit.  No left subclavian bruit or carotid bruits  Heart: Regular rate and rhythm no murmur  Extremities: Prominent pedal edema bilaterally.  There is some pulse delay on the right radial pulse and its weaker than the left      Neurological Exam:   Mental Status: Awake, alert, oriented to person, place and time.  Conversant without evidence of an affective disorder, thought disorder, delusions or hallucinations.  Attention span and concentration are normal.  HCF: No aphasia, apraxia or dysarthria.  Recent and remote memory intact.  Knowledge of recent events intact.  CN: I:   II: Visual fields full without left inattention   III, IV, VI: Eye movements intact without nystagmus or ptosis.  Pupils equal round and reactive to light.   V,VII: Light touch and pinprick intact all 3 divisions of V.  Facial muscles symmetrical.   VIII: Hearing intact to finger rub   IX,X: Soft palate elevates symmetrically   XI: Sternomastoid and trapezius are strong.   XII: Tongue midline without atrophy or fasciculations  Motor: Normal tone and bulk in the upper and lower extremities   Power testing: Rather severe global weakness in the left arm.  In the right arm there is less prominent weakness but the triceps is pretty weak as is finger and thumb extension abductor pollicis brevis and interossei.  In the lower extremities the psoas muscles are weak and the toe extension is weak but the remaining muscles are pretty strong  Reflexes: Upper extremities: Diffusely pathologically brisk with spread to nearby root zones        Lower extremities: Diffusely pathologically brisk with crossed adductors        Toe signs: Upgoing bilaterally  Sensory: Light touch: Patchy reduction left arm mostly dorsal into the hand        Pinprick: Patchy reduction left arm mostly dorsal into the hand as well as  the right thumb and first 2 digits of the left hand.        Vibration: Reduced at the ankle        Position: Intacked at the great toe    Cerebellar: Finger-to-nose: Not dysmetric           Rapid movement: Questionably slowed           Heel-to-shin: Somewhat impaired due to some weakness  Gait and Station: Comes to stand slowly.  Broad-based gait.  No foot slapping noted and no circumduction or scissoring    Results:      Lab Results   Component Value Date    GLUCOSE 82 09/04/2019    BUN 14 09/04/2019    CREATININE 0.62 (L) 09/04/2019    EGFRIFNONA 130 09/04/2019    BCR 22.6 09/04/2019    CO2 24.6 09/04/2019    CALCIUM 9.4 09/04/2019    ALBUMIN 4.50 09/04/2019    AST 16 09/04/2019    ALT 9 09/04/2019       Lab Results   Component Value Date    WBC 6.60 10/10/2019    HGB 15.4 10/10/2019    HCT 46.6 10/10/2019    MCV 94.5 10/10/2019     10/10/2019         .No results found for: RPR      Lab Results   Component Value Date    TSH 1.220 10/17/2016    Y2WIUCC 8.05 10/17/2016         No results found for: AAKFOHUT15      No results found for: FOLATE      No results found for: HGBA1C      Lab Results   Component Value Date    GLUCOSE 82 09/04/2019    BUN 14 09/04/2019    CREATININE 0.62 (L) 09/04/2019    EGFRIFNONA 130 09/04/2019    BCR 22.6 09/04/2019    K 4.3 09/04/2019    CO2 24.6 09/04/2019    CALCIUM 9.4 09/04/2019    ALBUMIN 4.50 09/04/2019    AST 16 09/04/2019    ALT 9 09/04/2019         Lab Results   Component Value Date    WBC 6.60 10/10/2019    HGB 15.4 10/10/2019    HCT 46.6 10/10/2019    MCV 94.5 10/10/2019     10/10/2019       MRI brain done at [facility] open MRI 12/5/2019 showing mild small vessel disease      Assessment:   1.  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  Exam looks like a central cord syndrome.  Motor neuron disease is not excluded however          Plan:  1.  MRI of the cervical and thoracic spine no contrast  2.  EMG both arms  Labs including a sed  rate, RPR, B12 and folate, thyroid functions, hemoglobin A1c, heavy metal screen, SPEP and IEP, copper level, cryoglobulins  3.  Using a small base quad cane to help reduce falling risk  4.  Should the patient have compressive cervical or thoracic cord impingement will refer to neurosurgery.                        Dictated utilizing Dragon dictation.

## 2020-02-10 NOTE — PROGRESS NOTES
SUBJECTIVE:  The patient is a 65-year-old white male.  He is had nasal congestion and cough.  He has had discolored drainage from his nose and slight discolored from his cough.  Unaware fever.  No chills.  No myalgias.    PAST MEDICAL HISTORY:  Reviewed.    REVIEW OF SYSTEMS:  Please see above; all others reviewed and are negative.      OBJECTIVE:   Vitals signs are reviewed and are stable.    General:  Well-nourished.  Alert and oriented x3 in no acute distress.  HEENT: PERRLA.  TMs occluded.  Throat red.  Neck:  Supple.   Lungs:  Clear.    Heart:  Regular rate and rhythm.   Abdomen:   Soft, nontender.   Extremities:  No cyanosis, clubbing or edema.   Neurological:  Grossly intact without motor or sensory deficits.     ASSESSMENT:    Bronchitis  PLAN: Amoxicillin 875 twice daily.  Mucinex as directed.  Follow-up if not improved in a couple days.  Call if problems.    Dictated utilizing Dragon dictation.

## 2020-02-11 NOTE — TELEPHONE ENCOUNTER
Pt sts that he would like a call about his after care summary that we was given when he checked out....please call him

## 2020-02-12 NOTE — TELEPHONE ENCOUNTER
Spoke with patient he had questions regarding labs that were ordered. Informed patient that he can come to the outpatient lab at the hospital to have those labs done.

## 2020-02-25 NOTE — TELEPHONE ENCOUNTER
----- Message from Kalia Doll MD sent at 2/21/2020 10:16 PM EST -----  Damesha, please let her know    His B12 is low at 184. He needs to start B12 vitamins. He can get them OTC 1mg (1000 micrograms) daily    gns

## 2020-03-11 NOTE — TELEPHONE ENCOUNTER
MARS  [phone]    PT HAS TRIED 2 TIMES TO DO AN MRI AND CANNOT LAY ON BACK THAT LONG  IS THERE ANY OTHER TEST HE CAN TRY

## 2020-03-12 NOTE — TELEPHONE ENCOUNTER
Spoke with patient he said if you could send the prescription to the [facility] pharmacy location that's in his chart. I told him to call [facility] back to reschedule the MRI.

## 2020-03-12 NOTE — TELEPHONE ENCOUNTER
I have ordered hydrocodone 5 tabs no refill. He can take 2 prior to his MRI if 1 does not control his pain sufficiently enough    gns

## 2020-03-12 NOTE — TELEPHONE ENCOUNTER
Spoke with you.  I am perfectly happy to give him some hydrocodone if his back pain is the main problem in preventing us from getting his MRI.    SHANIKA

## 2020-03-25 NOTE — TELEPHONE ENCOUNTER
PATIENT RETURNING OFFICE CALL ATTEMPTED TO WARM TRANSFER AFTER 5 MINUTES NOBODY PICKED UP THE LINE PATIENT WAS ADVISED TO CHECK FOR NURSE VOICEMAIL       SEE PREVIOUS SIGNED ENC FROM TODAY 3-25-20

## 2020-03-25 NOTE — TELEPHONE ENCOUNTER
Pt sates he has tried to mri for third time and he went to lay on his back flat and couldn't breathe , pt states mri tech told him that his best option would going to hospital and getting sedated.     Please advise  Ryley  [phone]

## 2020-03-25 NOTE — TELEPHONE ENCOUNTER
PATIENT STATES THAT HE IS OUT OF HIS MOMETASONE-FORMOTEROL 200-5 MCG INHALER AND STATES THAT HE IS AT DR DE LUNA WHOM NORMALLY PRESCRIBES THE MEDICATION FOR THE PATIENT BUT STATES THAT HE HAS NO SAMPLES LEFT AND WAS ADVISED TO CALL HIS PRIMARY CARE TO SEE IF HE MAY HAVE ANY SAMPLES? THE PATIENT STATES THAT THE MEDICATION IS HIGH IN COST AND CAN NOT PAY THE CO-PAY. PLEASE CALL PT BACK [phone] IN REGARDS TO THIS MATTER.

## 2020-03-26 NOTE — TELEPHONE ENCOUNTER
Gopal  I have reordered his MRI scans of the cervical and thoracic spine no contrast with anesthesia.  Please verify if he has an acute respiratory problem since he has had some bronchitis since the first of the year.    SHANIKA

## 2020-03-26 NOTE — TELEPHONE ENCOUNTER
Called pt he stated that he has really bad allergies but as of right now he is not having any respiratory problems

## 2020-04-08 NOTE — PATIENT INSTRUCTIONS
Start augmentin 875-125mg bid for 7 days.  Cont flonase, singular, inhalers.  mucinex bid as needed for cough, drainage,   cont f/u with allergist as scheduled.   If symptoms persist 5-7 day call office.   Increase fluid intake, get plenty of rest.   Patient agrees with plan of care and understands instructions. Call if worsening symptoms or any problems or concerns.

## 2020-04-08 NOTE — PROGRESS NOTES
Subjective   Ryley Vazquez is a 65 y.o. male.     History of Present Illness   You have chosen to receive care through a telephone visit today. Do you consent to use a telephone visit for your medical care today? Yes  This visit has been rescheduled as a phone visit to comply with patient safety concerns in accordance with CDC recommendations. Total time of discussion was 12 minutes.  C/o sinus pressure, does have seasonal allergies, taking injections, states symptoms started about 3 days ago, he does have drainage, he denies fever, does have cough with drainage. Denies sore throat. He denies ear pain, does have pressure, taking flonase, singular, hx of asthma, taking dulera, albuterol. He has appt with allergist next month. He was seen in 2/2020 for bronchitis, given amoxicillin states symptoms improved but then returned. Has noticed wheezing in am.         The following portions of the patient's history were reviewed and updated as appropriate: allergies, current medications, past family history, past medical history, past social history, past surgical history and problem list.    Review of Systems   Constitutional: Negative for chills, diaphoresis and fever.   HENT: Positive for congestion, ear pain, postnasal drip, rhinorrhea and sinus pressure. Negative for sore throat.    Respiratory: Positive for cough and wheezing. Negative for shortness of breath.    Cardiovascular: Negative for chest pain.   Musculoskeletal: Negative for arthralgias and myalgias.   Allergic/Immunologic: Positive for environmental allergies.   Neurological: Negative for dizziness, light-headedness and headache.   All other systems reviewed and are negative.      Objective   Physical Exam   Constitutional: He is oriented to person, place, and time.   Pulmonary/Chest: Effort normal.   Neurological: He is alert and oriented to person, place, and time.         Assessment/Plan   Diagnoses and all orders for this visit:    Acute maxillary  sinusitis, recurrence not specified    Allergic rhinitis, unspecified seasonality, unspecified trigger    Other orders  -     amoxicillin-clavulanate (AUGMENTIN) 875-125 MG per tablet; Take 1 tablet by mouth 2 (Two) Times a Day for 7 days.  -     guaiFENesin (Mucinex) 600 MG 12 hr tablet; Take 2 tablets by mouth 2 (Two) Times a Day.        Start augmentin 875-125mg bid for 7 days.  Cont flonase, singular, inhalers.  mucinex bid as needed for cough, drainage,   cont f/u with allergist as scheduled.   If symptoms persist 5-7 day call office.   Increase fluid intake, get plenty of rest.   Patient agrees with plan of care and understands instructions. Call if worsening symptoms or any problems or concerns.

## 2020-04-14 NOTE — PROGRESS NOTES
Subjective   Ryley Vazquez is a 65 y.o. male.     History of Present Illness   C/o sinus congestion and tenderness x 9 days with prod cough, HA and ear pressure, PND SOA and wheezing, no fever or sore throat no known COVID-19 exposure, with chronic asthma and allergies on mucinex, singulair 10 mg, dulera, NS and increased albuterol twice daily usually doesn't have to use daily, NKDA, last tx sinusitis 4/08/20 augmentin but states not helping after 6 days of medication, last tx asthmatic bronchitis 01/19 amox 875 mg, medrol dose pack and tessalon perles, current smoker 1 ppd with COPD, last CXR 08/17  With chronic RA on methotrexate 2.5 mg 7 PO once weekly sees Dr Doll but didn't take this week dose d/t taking abx  With chronic HTN on nifedipine XL 60 mg and lasix 20 mg, no regular BP checks, no CP dizziness HA LE edema    lThe following portions of the patient's history were reviewed and updated as appropriate: allergies, current medications, past family history, past medical history, past social history, past surgical history and problem list.    Review of Systems   Constitutional: Negative for fever.   HENT: Positive for congestion, ear pain, facial swelling, postnasal drip, sinus pressure and sinus pain. Negative for dental problem, drooling, ear discharge, hearing loss, mouth sores, nosebleeds, rhinorrhea, sneezing, sore throat, tinnitus, trouble swallowing and voice change.    Respiratory: Positive for cough, chest tightness, shortness of breath and wheezing. Negative for apnea, choking and stridor.    Cardiovascular: Negative for chest pain.   Neurological: Positive for headaches. Negative for dizziness.   All other systems reviewed and are negative.      Objective   Physical Exam   Constitutional: He is oriented to person, place, and time.   Neurological: He is alert and oriented to person, place, and time.   Psychiatric: He has a normal mood and affect. His behavior is normal. Judgment and thought content  normal.       Assessment/Plan   Ryley was seen today for sinusitis and asthma.    Diagnoses and all orders for this visit:    Moderate persistent asthmatic bronchitis with acute exacerbation    COPD exacerbation (CMS/[facility])    Acute maxillary sinusitis, recurrence not specified    Seasonal allergic rhinitis, unspecified trigger    Essential hypertension    Tobacco abuse    Rheumatoid arthritis, involving unspecified site, unspecified rheumatoid factor presence (CMS/[facility])    Other orders  -     methylPREDNISolone (MEDROL, COREY,) 4 MG tablet; Take as directed on package instructions.  -     benzonatate (Tessalon Perles) 100 MG capsule; Take 1 capsule by mouth 3 (Three) Times a Day As Needed for Cough.  -     doxycycline (VIBRAMYICN) 100 MG tablet; Take 1 tablet by mouth 2 (Two) Times a Day.     You have chosen to receive care through a telephone visit. Do you consent to use a telephone visit for your medical care today? Yes spent 12 minutes on phone with patient. Unable to have full physical exam d/t telephone encounter. Stop augmentin and start doxycycline 100 mg BID x 10 days, erx medrol dose pack use as directed, erx tessalon perles 100 mg TID Prn cough, increase H20 and rest, call or RTC if sx persist or worsen, cont dulera, albuterol, singulair and mucinex, hold methotrexate as instructed by Rheuma, check BP at home

## 2020-05-07 NOTE — TELEPHONE ENCOUNTER
called to request his July nerve conduction test be moved to an earlier date. I spoke with the  and was told that a message would be sent to  to determine scheduling. Patient has been informed. Thankyou!

## 2020-05-07 NOTE — TELEPHONE ENCOUNTER
PATIENT WOULD LIKE A RETURN CALL ABOUT HIS CONDITION, HE SAID HE CANNOT WALK AND HE SAID IT'S TIME TO DO SOMETHING.     PATIENT NUMBER [phone]

## 2020-05-08 NOTE — TELEPHONE ENCOUNTER
Pt is upset that he can not get MRI with sedation, stating he is getting worse by the day. Pt says he is about to get to ED so he can get MRI with sedation since right now hospital is still no doing that right now, per hospital and patient. Is there anything that can be done for pt?    I moved EMG to 5- at 10am

## 2020-05-08 NOTE — TELEPHONE ENCOUNTER
I called the patient at [phone] which is the only number I see listed.  It went to [facility].  I told him I agreed he should go to the emergency room.    SHANIKA    5/8/2020  6:48 PM

## 2020-05-11 NOTE — TELEPHONE ENCOUNTER
I communicated with Dr. Sanchez'  office who told me to have him call their office in the morning first thing to get an appointment to see 1 of the neurosurgeons in the office and they would consider doing a video evaluation and schedule a myelogram.    I called the patient's cell phone number and his wife's cell phone number both of which went to voicemail.  I left the same message which is to call Dr. Sanchez' office in the morning about 8:00 at [phone] and they would set him up to be seen in the office.    SHANIKA

## 2020-05-11 NOTE — TELEPHONE ENCOUNTER
I called pt's spouse, pts phone was turned off. I talked with spouse about pt going to ED. Pt would like to speak with you.  Pt has turned phone back on.

## 2020-05-12 PROBLEM — M47.12 CERVICAL SPONDYLOSIS WITH MYELOPATHY: Status: ACTIVE | Noted: 2020-01-01

## 2020-05-12 NOTE — PROGRESS NOTES
Subjective   History of Present Illness: Ryley Vazquez is a 65 y.o. male is being seen for consultation today at the request of Kalia Doll MD for left arm and hand numbness with weakness.    You have chosen to receive care through a telephone visit. Do you consent to use a telephone visit for your medical care today? Yes    We did a telephone visit with the patient at home and me in the office.  We talked for 11 minutes.    History of Present Illness    This patient has been having increasing trouble with his neck and his left arm and hand.  He says that he has no use of his left arm at all.  This is been going on for several weeks.  He says he does not have a lot of pain.  He has some weakness in his right arm but not a lot of weakness in his legs.  He has no difficulty with bowel bladder control or other associated symptoms.  He has had no specific treatment so far.    The following portions of the patient's history were reviewed and updated as appropriate: allergies, current medications, past family history, past medical history, past social history, past surgical history and problem list.    Review of Systems   Constitutional: Positive for activity change.   HENT: Negative.    Eyes: Negative.    Respiratory: Negative for chest tightness and shortness of breath.    Cardiovascular: Negative for chest pain.   Gastrointestinal: Negative.    Endocrine: Negative.    Genitourinary: Negative.    Musculoskeletal: Positive for myalgias and neck stiffness.   Skin: Negative.    Allergic/Immunologic: Negative.    Neurological: Positive for weakness and numbness.        Positive for tingling   Psychiatric/Behavioral: Negative.    All other systems reviewed and are negative.      Objective         Physical Exam   Constitutional: He is oriented to person, place, and time.   Neurological: He is oriented to person, place, and time.     Neurologic Exam     Mental Status   Oriented to person, place, and time.            Assessment/Plan   Independent Review of Radiographic Studies:      I personally reviewed the images from the following studies.    There is no imaging of his spine at this point.  He cannot lie flat for an MRI because he cannot breathe.    Medical Decision Making:      I told the patient I see little option at this point but to proceed with a total spine myelogram.  He cannot do an MRI and we cannot get one done under general anesthesia during this pandemic.  I told the patient what a myelogram involves.  I explained that there is a 50% chance of developing a bad headache and nausea as a result of the test.  I explained that there is also a very small chance of infection, seizures, and bleeding.  I explained how we would treat a post myelogram headache including bedrest, caffeinated fluids, steroids, and blood patch.  The patient does ask to proceed.    Ryley was seen today for numbness and extremity weakness.    Diagnoses and all orders for this visit:    Cervical spondylosis with myelopathy  -     IR Myelogram 2 or More Areas; Future  -     CT Cervical Spine With Intrathecal Contrast; Future  -     Ct Thoracic Spine With Intrathecal Contrast; Future  -     CT Lumbar Spine With Intrathecal Contrast; Future  -     XR Spine Cervical Complete With Flex Ext; Future  -     XR Spine Lumbar Complete With Flex & Ext; Future  -     dexamethasone (DECADRON) 4 MG tablet; Take 2 tablets by mouth Take As Directed. Take both tablets by mouth 2 hours before myelogram      Return for After radiology test.

## 2020-05-12 NOTE — TELEPHONE ENCOUNTER
I spoke with the patient.  He had called neurosurgery this morning and he is to discuss further at 2 PM this afternoon.  Since MRI with anesthesia is not available due to COVID-19 issues I am told, myelogram with post myelogram CT is the alternative imaging study.    SHANIKA

## 2020-05-12 NOTE — TELEPHONE ENCOUNTER
Pt called this morning and understand to call Dr. Sanchez' office but he is still wanting to know about having an MRI.    please advise

## 2020-05-12 NOTE — PROGRESS NOTES
Subjective   History of Present Illness: Ryley Vazquez is a 65 y.o. male is here today for follow-up with a new Total Spine Myelogram that was ordered at his office visit 5/12/20 for neck and left arm and hand pain with weakness in his hand. He has no use of his left hand at all.    Today his symptoms are unchanged from his previous visit yesterday    Patient is wearing a mask in our office today. His wife is also wearing a mask in the office.    History of Present Illness    This patient continues with severe weakness in his left arm.  He cannot move his shoulder his biceps his triceps or his hand.  He has similar weakness in his right arm but he does have a little bit of shoulder movement on the right.    The following portions of the patient's history were reviewed and updated as appropriate: allergies, current medications, past family history, past medical history, past social history, past surgical history and problem list.    Review of Systems   Constitutional: Positive for activity change.   Respiratory: Negative for chest tightness and shortness of breath.    Cardiovascular: Negative for chest pain.   Musculoskeletal: Positive for gait problem, myalgias, neck pain and neck stiffness.   Neurological: Positive for weakness and numbness.   All other systems reviewed and are negative.      Objective     Vitals:    05/14/20 1026   BP: 141/77   Pulse: 81   Temp: 98.2 °F (36.8 °C)     There is no height or weight on file to calculate BMI.      Physical Exam   Constitutional: He appears well-developed and well-nourished.     Neurologic Exam     Motor Exam     Strength   Strength 5/5 except as noted.   Right deltoid: 3/5  Left deltoid: 1/5  Right biceps: 1/5  Left biceps: 1/5  Right triceps: 1/5  Left triceps: 1/5  Right wrist flexion: 1/5  Left wrist flexion: 1/5  Right wrist extension: 1/5  Left wrist extension: 1/5  Right interossei: 1/5  Left interossei: 1/5    Sensory Exam   Right arm light touch: decreased  from elbow  Left arm light touch: decreased from elbow          Assessment/Plan   Independent Review of Radiographic Studies:      I personally reviewed the images from the following studies.    I reviewed his 1 myelogram film which does show some lateral recess stenosis on the left at L3-4 but no severe spinal stenosis in his lumbar spine.  The patient was having severe difficulty breathing and so we had to abort the remainder of the plain film myelogram and go straight to a CT.  On the post myelographic CT scan L5-S1 shows some left-sided foraminal stenosis that is fairly severe.  There is also some lateral recess stenosis and some central stenosis at L4-5.  L3-4 also shows some central stenosis and lateral recess stenosis.  L2-3 is widely open as is L1-2.  The entire thoracic spine shows multilevel degenerative change in some areas where there is some central stenosis but there is no compression of the spinal cord or the neural elements at any level.  On the 6 post myelographic CT scan of the cervical spine C7-T1 looks fine.  C6-7 shows some anterior spurring but the patient has had a complete laminectomy from C3 all the way down to the top of C7.  There is no significant compression of the spinal cord.  The neural foramina at C6-7 are actually fairly open.  C5-6 also shows some posterior vertebral body spurring causing some flattening of the thecal sac but no severe compression of the cord.  The neural foramina at that level are also fairly open.  C4-5 show some flattening of the cord and a little narrowing of the foramina but not severe.  C3-4 is widely open as are the foramina and C2-3 is also open.  We do not yet have an official report on this test which was done earlier today.    Medical Decision Making:      I told the patient and his wife about the imaging.  I told him that I do not see anything here that would account for the severe weakness he has in his left arm.  He also has weakness in his right arm.   I do not see any significant cord compression because of the previous laminectomy.  He does have some foraminal stenosis but I cannot explain how he would have a multilevel nerve involvement this severely if it were from that.  I believe he needs an EMG to better differentiate the issue here.  He says he is scheduled for 1 on May 22.  We will schedule him for a telephone visit to discuss those results when they become available.  I am not sure if he may have some type of degenerative disease causing this.    Ryley was seen today for neck pain.    Diagnoses and all orders for this visit:    Cervical spondylosis with myelopathy      Return for Recheck and call after treatment or consultation.

## 2020-05-14 NOTE — NURSING NOTE
Pt reports he can lay flat on his side so pt laid on his left side and placed in trendelenburg. Pt HOB at 10 degrees and so far is tolerating laying flat for now. VSS.

## 2020-05-14 NOTE — NURSING NOTE
Pt complains of being unable to catch his breath. Sats checked - 96% on room air with pulse of 90. Pt is anxious and assisted pt with talking him through slow deep breaths. Pt is not normally on oxygen but placed on oxygen per NC at 2L for comfort only. Pt assured that his oxygen level is good as well as his heart rate. Pt did try to use his inhaler from home but could not get a deep enough breath to reach his lungs. Wheezing noted all over and pt has a history of COPD.    Dr Sanchez office called and order received for Duo neb. RT paged and informed. Pt rechecked and Sats remain 96% on the 2L per NC. Pt reports chest heaviness and again we discussed his being anxious and needing to slow his breathing down. Assured pt the breathing treatment will help open his lungs so he can get better air movement. Spouse and pt verbalize understanding.

## 2020-05-14 NOTE — NURSING NOTE
"After breathing treatment, pt still complains of not being able to breath and wanting to sit at the bedside. Sats are 93% on room air and pt is anxious. Again, I discussed with pt and his spouse needing him to lay as flat as he could possibly tolerate so the contrast could get to his neck for the CT. Pt reports \"I just can't do it\". Pt assured his oxygen level and HR are normal. Pt encouraged again to take slow, deep breaths. Pt assisted to the bathroom and sats upon his return are 93%. Pt HOB is 25% per his insistence so he can breath. Pt placed in trendelenburg again. Wife aware of what we are needing to achieve at this time so she is assisting to keep pt calm and cooperative.  Pt placed back on oxygen for comfort only. Will continue to monitor. CT to be done in 1 hr.   RT stated that his wheezing has decreased after the Duo neb. Pt made aware. Consistent reassurance provided to pt.  "

## 2020-05-14 NOTE — DISCHARGE INSTRUCTIONS
EDUCATION /DISCHARGE INSTRUCTIONS:    A myelogram is a special radiology procedure of the spinal cord, spinal nerves and other related structures.  You will be awake during the examination.  An area of your lower back will be cleansed with an antiseptic solution.  The physician will inject a numbing medication in your lower back.  While your back is numb, a needle will be placed in the lower back area.  A small amount of spinal fluid may be withdrawn and sent to the lab if ordered by your physician. While the needle is in the back, an injection of a contrast material (xray dye) will be given through the needle.  The contrast material will allow the physician to see the spinal cord and spinal nerves.  Once injected, the needle will be removed and a band aid will be placed over the injection site.  The table will be tilted during the process to allow the contrast material to flow to particular areas in the spine.  Following the injection and xrays, you will be taken to the CT scan where more pictures will be taken. After the procedure is finished, the contrast material will be absorbed by your body and eliminated through your kidneys.  The radiologist will study and interpret your myelogram and send the results to your physician.  Procedure risks of a myelogram include, but are not limited to:  *  Bleeding   *  seizure  *  Infection   *  Headache, possibly severe requiring  *  Contrast reaction      a blood patch  *  Nerve or cord injury  *  Paralysis and death  Benefits of the procedure:  *  Best examination for delineating pathology related to spinal cord compression from a    disc and/or nerve root compression  Alternatives to the procedure:  MRI - a non invasive procedure requiring intravenous contrast injection.  Cannot be done on patients with certain pacemakers or metal in the body.  MRI risks include possible reaction to the contrast material, movement of metal located in the body.Benefit to MRI:  Non-invasive  and usually painless procedure.  THIS EDUCATION INFORMATION WAS REVIEWED PRIOR TO PROCEDURE AND CONSENT. Patient initials____LM____________Time______0645____________    24 hour rest period ends tomorrow, May 15th after 1000 am.  Important information following your myelogram:  * ACTIVITY:   *  Lie down with your head elevated no more than 2 pillows high today & tonight  *  Sit up to eat your meals and use the restroom, otherwise, lie down.  *  Remain less active for two to three days.  *  Do not drive for 48 hours following a myelogram  *  You may remove the bandage and shower in the morning  *  Increase your fluids for the next 24 hours.  Caffeinated drinks are encouraged.  Resume taking blood thinner or aspirin on tomorrow, May 15th after 1000 am.    CALL YOUR PHYSICIAN FOR THE FOLLOWING:  * Pain at the injection site  * Reddness, swelling, bruising or drainage at the injection site.  * A fever by mouth of 101.0 or any new symptoms  Headaches are a common side effect after a myelogram.  If you get a headache, you should stay flat in bed and drink plenty of fluids. If the headache persist and does not go away with rest/medication, CALL Dr. Sanchez at [phone]

## 2020-05-14 NOTE — TELEPHONE ENCOUNTER
Patient is aware of message from Dr. Sanchez. He also mentioned some tremors in his arms and legs after his Myelogram. This was discussed verbally with Dr. Sanchez. He recommended we give him a MDP.  Patient is aware that Dr. Sorto office will be in touch to potentially get further imaging of his lungs.    Called and spoke with Alondra at Dr. Sorto office. Gave her the information and she will get it to Dr. Sorto.        ----- Message from Perfecto Sanchez MD sent at 5/14/2020  3:01 PM EDT -----  Radiologist called and there was a a suspicious area in his lung.  Please let the patient know this and recommend that he get in touch with his PCP about any further evaluation.  Please send a copy of the CT report once we get it to his PCP and let the PCPs office know specifically.

## 2020-05-14 NOTE — NURSING NOTE
Pt dressed then assisted via wheelchair by Geraldine with spouse to Dr Sanchez' office for office visit.   Pt reports he is feeling better. No respiratory distress upon discharge.

## 2020-05-19 NOTE — TELEPHONE ENCOUNTER
[facility] SCHEDULING CALLED STATING THERE WAS AN ORDER FOR A CT SCAN WITH CONTRAST. PT TOLD STAFF HE CAN NOT LAY DOWN FLAT BECAUSE WHEN HE LAYS ON HIS BACK HE FEELS LIKE HE STOPS BREATHING. PT STATES THERE WAS SOMETHING DR SWIFT GAVE HIM SOMETHING TO RELAXING. PT IS IN A LOT OF PAIN AND JUST WANTS SOMETHING TO HELP HIM RELAX TO BE ABLE TO COMPLETE THE CT SCAN.     PLEASE ADVISE    CALL BACK  [phone]

## 2020-05-19 NOTE — TELEPHONE ENCOUNTER
PATIENT CALLED IN AND STATED HE IS RETURNING A CALL TO [facility] . HUB ATTEMPTED TO REACH OUT TO PRACTICE NO ANSWER . PATIENT CALL BACK   [phone].

## 2020-05-19 NOTE — TELEPHONE ENCOUNTER
You have had previous CAT scans.  Did you stop breathing with him.?  I can give you something to help relax but if it would make you stop breathing that would be dangerous.  Do you have a sleep apnea doctor?  If so he may be able to advise on the situation.

## 2020-05-22 NOTE — PROGRESS NOTES
EMG and Nerve Conduction Studies    I.      Instrument used: Neuromax 1002  II.     Please see data sheets for tabular summary of NCS and details on methods, temperatures and lab standards.   III.    EMG muscles tested for upper extremity studies include the deltoid, biceps, triceps, pronator teres, extensor digitorum communis, first dorsal interosseous and abductor pollicis brevis.    IV.   EMG muscles tested for lower extremity studies include the vastus lateralis, tibialis anterior, peroneus longus, medial gastrocnemius and extensor digitorum brevis.    V.    Additional muscles tested as needed.  Paraspinal muscles tested as needed.   VI.   Please see data sheets for tabular summary of EMG findings.   VII. The complete report includes the data sheets.      Indication: Peripheral neuropathy versus motor neuron disease  History: 65-year-old male with history of unroofing procedure from C3-C7 for a cervical myelopathy who has progressive weakness in both arms left earlier in greater than right as well as some numbness in the arms and legs which had been present ever since he had his surgery on his neck a number of years ago.  Recent myelogram with post myelogram CT shows a thin cervical cord consistent with myelomalacia and moderate stenosis of the neuroforamina at several levels.  Although it was read out as severe it looks more moderate.  Question of motor neuron disease given the severe weakness in both arms      Ht: 170.2 cm  Wt: 117 kg; BMI 40.3  HbA1C:   Lab Results   Component Value Date    HGBA1C 5.51 02/21/2020     TSH:   Lab Results   Component Value Date    TSH 1.580 02/21/2020       Technical summary:  Nerve conduction studies were obtained in both arms.  Skin temperatures were at least 32 °C measured on the palms.  Needle examination was obtained on selected muscles in both arms.  Study was technically difficult to perform since the patient cannot lie flat and it was done while him seated in a  wheelchair.  Paraspinals were not studied due to previous posterior cervical spine surgery and inability to position him for thoracic paraspinals    Results:  1.  Mildly prolonged median sensory latencies bilaterally at 3.8 ms on the left and 3.7 ms on the right with low amplitudes of 14.7 µV on the left and 60.7 µV on the right.  2.  Normal left ulnar sensory latency with low amplitude of 11.7 µV.  Prolonged left ulnar sensory latency at 3.8 ms with low amplitude of 8.2 µV.  3.  Normal left radial sensory latency with low amplitude of 8 µV.  4.  Prolonged left median motor latency at 5.6 ms with slow conduction velocity of 41.9 m/s and very low amplitude of 1.3 mV from wrist stimulation and no significant evidence of conduction block from proximal stimulation.  Prolonged right median motor latency at 4.6 ms with slow velocity of 46.9 m/s.  The amplitudes were low at 2 mV without evidence of motor conduction block proximally.  5.  Normal left ulnar motor conduction velocities below the elbow and in the short segment across the elbow with normal distal latency but very low amplitude of 1.6 mV and no evidence of conduction block proximally.  Normal right ulnar motor conduction velocities below the elbow and in the short segment across the elbow with a normal distal latency but low amplitude of 3.0 mV from wrist stimulation and no convincing evidence of conduction block proximally.  6.  Needle examination of selected muscles of both arms showed multiple abnormalities.  There were fibrillations and/or positive sharp waves in every muscle tested in both arms.  There was an increased number of large motor units increased firing rates and reduced interference patterns globally.  Cervical paraspinals were not studied due to previous unroofing procedure in the posterior spine.  Thoracic paraspinals were not performed since the patient cannot lie down due to shortness of air    Impression:  Complex abnormal study.  There is  evidence of peripheral neuropathy with all sensory studies being abnormal but recordable.  The motor studies show rather significant reductions of amplitude suggestive of significant axonal involvement.  The study could instead be consistent with mild peripheral neuropathy with superimposed motor neuron disease or other cause of multiple radiculopathies with both acute and chronic changes on needle exam noted.  Clinical correlation suggested.  Study results were discussed with the patient.    Kalia Doll M.D.      Addendum: Reviewed his labs.  They had already been reviewed with him 2 months ago.  His B12 level is low and so he started B12 oral supplementation.  We will proceed with rechecking the B12 level (he had gastric bypass surgery previously and had been on parenteral B12 previously) and a motor and sensory neuropathy panel for antibodies.  We discussed second opinion at a tertiary referral center.  We will send him to see Dr. Krsi Price at the [facility] ALS clinic        Dictated utilizing Dragon dictation.

## 2020-05-22 NOTE — TELEPHONE ENCOUNTER
Dr Sorto [facility] said a low dose of Ativan and have patient use his Cpap if he naps following C/T scan

## 2020-05-22 NOTE — TELEPHONE ENCOUNTER
Dementia,  I am sending the patient to see Dr. Kris Price at the [facility] for potential diagnosis of ALS.  I would like you to troubleshoot the information we have obtained since Dr. Price will need all of the information including a disc of the myelogram and post myelogram CT scans, EMG and data sheets    Thanks  SHANIKA

## 2020-05-26 NOTE — TELEPHONE ENCOUNTER
PATIENT CALLED IN AND REQUESTED TO LEAVE MESSAGE FOR YI PATIENT STATED HE GOT HER MESSAGE AND PATIENT SAYS THANK YOU VERY MUCH . PATIENT CALL BACK [phone].

## 2020-05-26 NOTE — PROGRESS NOTES
SUBJECTIVE:  The patient is a 65-year-old white male.  Because of the COVID-19 pandemic telephone visit has been arranged.  Permission/consent obtained for patient the patient feels like he has a prostate infection.  He has had these in the past.  Over the last week he has had urinary urgency.  He suffers from other medical illnesses as well.  He has neuropathy and autoimmune disease.  He has become quite immobile over the last month.  He has painful feet, legs, and has difficulty ambulating and getting in and out of bed.  He has COPD and sleep apnea.  Due to shortness of breath and pain within his abdomen and chest he is unable to change positions and sleep properly or change positions while in the bed.  He needs a head of his bed elevated more than 30 degrees most of the time higher thant 30 degrees due to difficulty breathing from COPD and lung mass.  He will benefit from trapeze also to help facilitate transport from bed to wheelchair.    PAST MEDICAL HISTORY:  Reviewed.    REVIEW OF SYSTEMS:  Please see above; all others reviewed and are negative.      OBJECTIVE:       ASSESSMENT:    Prostatitis  Immobility  Neuropathy  Dyspnea  COPD  Polyarthralgia    PLAN: I spent 15 minutes in the care of this patient.  He is prescribed Cipro 500 twice daily.  He will follow-up with me if not improved in a couple days.  He will try to get by the office and leave a urine specimen.  He will need a hospital bed for home use and a wheelchair for transportation other activities of daily living.  Because of the multiple medical problems this patient is experiencing at this time he needs a wheelchair and he is unable to safely use a cane or a walker.  He is at a risk for falls.  He does have the mental capacity and strength to propel a standard wheelchair inside the home.  You also need a hospital bed.  His medical condition requires positioning of the body in ways not feasible with an ordinary bed.  Bed wedges have been considered  and ruled out.    Dictated utilizing Dragon dictation.

## 2020-05-27 NOTE — PROGRESS NOTES
Subjective   History of Present Illness: Ryley Vazquez is a 65 y.o. male is here today for follow-up via Telephone Visit with a new EMG that was ordered at his last office visit for weakness.    You have chosen to receive care through a telephone visit. Do you consent to use a telephone visit for your medical care today? Yes    I talked to this patient today on the phone.  He was at home and I was in the office.  We talked for 3 minutes.    History of Present Illness    The following portions of the patient's history were reviewed and updated as appropriate: allergies, current medications, past family history, past medical history, past social history, past surgical history and problem list.    Review of Systems   Constitutional: Positive for activity change.   Respiratory: Negative for chest tightness and shortness of breath.    Cardiovascular: Negative for chest pain.   Musculoskeletal: Positive for gait problem, myalgias, neck pain and neck stiffness.   Neurological: Positive for weakness.       Objective         Physical Exam   Constitutional: He is oriented to person, place, and time.   Neurological: He is oriented to person, place, and time.     Neurologic Exam     Mental Status   Oriented to person, place, and time.           Assessment/Plan   Independent Review of Radiographic Studies:      I personally reviewed the images from the following studies.    I reviewed his EMG which shows a peripheral neuropathy but also the possibility of motor neuron disease.    Medical Decision Making:      The patient says that he is scheduled for blood work through Dr. Doll to be done tomorrow.  There was a plan after that to get him into see another neurologist.  I will leave that up to Dr. Doll but I did tell the patient that if he does not hear anything within a week or 2 after the blood work let me know and we will help move things along if necessary.  Otherwise I think this substantiates the fact that there is no  surgery treatment available at this point.  Ryley was seen today for follow-up.    Diagnoses and all orders for this visit:    Cervical spondylosis with myelopathy      Return if symptoms worsen or fail to improve.

## 2020-05-28 NOTE — TELEPHONE ENCOUNTER
WIFE REQUESTED TO SEE IF  COULD WRITE ORDERS FOR PATIENT TO GET A WHEELCHAIR AND HOSPITAL BED, WIFE REQUESTED FOR THE ORDERS TO BE SENT TO [facility] [place].     BEST CALL BACK  [phone]    PLEASE ADVISE

## 2020-05-29 NOTE — TELEPHONE ENCOUNTER
PATIENT CALLED ABOUT THE B-12 LABS AND HE WAS THOUGHT HE WAS TO HAVE ANOTHER TEST COMPLETED.     PLEASE CALL PATIENT -[phone]

## 2020-06-01 NOTE — TELEPHONE ENCOUNTER
Pt stated that the lab would not complete the motor sensory panel he stated he tried to call the office but didn't get to speak with anyone. He also said that he has an appt with Dr. Price on Friday but he can't get anyone to take him downtown to his appt. He wanted know if there's another doctor that is not located in [facility] he could be referred to? Please advise

## 2020-06-03 NOTE — TELEPHONE ENCOUNTER
Spoke with outpatient lab they stated that the motor sensory panel is a send out lab and since the patient came on a Friday it would be two days before the specimen reached the lab and the life of the specimen would be lost. So I called the patient to tell him the lab can be completed and to try to go between Monday and Thursday.

## 2020-06-03 NOTE — TELEPHONE ENCOUNTER
Spoke with patient informed him that Dr. Ponce is the only physician locally to see him for his diagnosis.

## 2020-06-03 NOTE — TELEPHONE ENCOUNTER
Pt called wanting to know if Dr Sanchez could call him with the results of his xrays?, which were done on 5/28. Please call and advise

## 2020-06-03 NOTE — TELEPHONE ENCOUNTER
Patient is referring to XR that should have been done with his Myelogram which was 5/14/2020 however they were not done until 5/29/2020.

## 2020-06-04 NOTE — TELEPHONE ENCOUNTER
Let him know that the plain film x-rays look exactly the same as the myelogram films except without the contrast.  On the bending films there is no evidence of movement.  These do not change any of my recommendations.  We can schedule him for another visit if he wishes.

## 2020-06-05 NOTE — TELEPHONE ENCOUNTER
Patients wife informed we have sent over orders for wheelchair and bed she will check with [facility].

## 2020-06-05 NOTE — TELEPHONE ENCOUNTER
PTS WIFE CALLED IN ASKING IF DR SWIFT COULD ORDER A WHEELCHAIR LIGHT ENOUGH FOR HER TO  FOR HER .  WIFE(ANIRUDH) SAYS THAT THEY ARE USING A WHEELCHAIR AT THE MOMENT BUT SHE REALLY NEEDS ONE ORDERED FOR PT.     CALL BACK   [phone]

## 2020-06-09 NOTE — TELEPHONE ENCOUNTER
PT CALLED REGARDING HIS NEUROPATHY BLOOD PANEL RESULTS THAT HE HAD DONE LAST Thursday. HE WOULD LIKE A CALL BACK WITH THOSE RESULTS          BEST CALL BACK- [phone]

## 2020-06-10 NOTE — TELEPHONE ENCOUNTER
Spoke with the pt and discussed his Motor and sensory neuropathy panel with abnormal GM1 antibodies    gns

## 2020-06-10 NOTE — TELEPHONE ENCOUNTER
Spoke with patient regarding lab result he asked what do the positive antibody mean he said he don't have an appt with Dr. Price until Monday 6/15. He said he it seem like he can't get any answers. Please advise

## 2020-06-11 PROBLEM — R91.8 LUNG MASS: Status: ACTIVE | Noted: 2020-01-01

## 2020-06-11 NOTE — H&P (VIEW-ONLY)
Subjective   Patient ID: Ryley Vazquez is a 65 y.o. male is being seen for consultation today at the request of Braulio Sorto MD    History of Present Illness  Dear Colleague,  Ryley Vazquez was seen in the lung care center at [facility] today June 11, 2020 as part of our multidisciplinary thoracic oncology clinic.  I have reviewed his history his x-rays and have examined him.  Thank you for asking us participate in the care of this pleasant gentleman.    The patient is a 65-year-old  male.  He is a lifelong smoker since age 13.  He has smoked 1 pack of cigarettes per day.  He has worked as a  all of his life.  Recently he has developed numbness and paresthesias in both upper extremities.  This is progressed to weakness and paralysis of the upper extremities.  He is now being evaluated for possible ALS.  As part of this evaluation he underwent a cervical and thoracic spine CT scan.  He was found to have a lesion in his right upper lobe.  He has had a formal CT of the chest which shows right upper lobe mass and mediastinal adenopathy.  He is here for further evaluation.    Patient has severe allergies and has been diagnosed with asthmatic bronchitis.  He is on shots, oral medications, and inhalers.  He has a chronic cough with intermittent wheezing.  He has had no hemoptysis.  He has had no hoarseness or change in his voice.  There has been no pleuritic pain.  He has had no unexplained weight loss.  He is very sedentary because of his current illness and it is difficult to assess whether or not he has shortness of breath on exertion.    The following portions of the patient's history were reviewed and updated as appropriate: allergies, current medications, past family history, past medical history, past social history, past surgical history and problem list.  Review of Systems   Constitution: Negative.   HENT: Negative.    Eyes: Negative.    Cardiovascular: Positive for irregular  heartbeat.   Respiratory: Positive for shortness of breath and wheezing.    Endocrine: Negative.    Hematologic/Lymphatic: Negative.    Skin: Negative.    Musculoskeletal: Negative.    Gastrointestinal: Negative.    Genitourinary: Negative.    Neurological: Positive for focal weakness, numbness and paresthesias.   Psychiatric/Behavioral: Negative.    Allergic/Immunologic: Negative.      Patient Active Problem List   Diagnosis   • CARLITOS (obstructive sleep apnea)   • Sinusitis   • Hypertension   • Asthmatic bronchitis   • Arthritis   • Osteoarthritis of multiple joints   • Class 3 severe obesity with body mass index (BMI) of 40.0 to 44.9 in adult (CMS/[facility])   • Cervical spondylosis with myelopathy   • Lung mass     Past Medical History:   Diagnosis Date   • Arthritis     rheumatoid   • Asthmatic bronchitis    • COPD (chronic obstructive pulmonary disease) (CMS/[facility])    • Difficulty walking    • Hypertension    • Movement disorder    • Sinusitis    • Sleep apnea      Past Surgical History:   Procedure Laterality Date   • CERVICAL FUSION N/A 2005   • COLONOSCOPY  2014    polyps   • GASTRIC BYPASS     • HERNIA REPAIR     • ROTATOR CUFF REPAIR Right 1985     Family History   Problem Relation Age of Onset   • Asthma Mother    • Cancer Father         COLON   • Arthritis Father    • Hypertension Father    • Diabetes Father    • Heart disease Brother    • Cancer Other    • Arthritis Other    • Alcohol abuse Other    • Hypertension Maternal Grandmother    • Diabetes Maternal Grandmother    • Stroke Paternal Grandmother    • Hypertension Paternal Grandmother    • Diabetes Paternal Grandmother    • Heart disease Paternal Grandfather      Social History     Socioeconomic History   • Marital status:      Spouse name: Not on file   • Number of children: Not on file   • Years of education: Not on file   • Highest education level: Not on file   Tobacco Use   • Smoking status: Current Every Day Smoker     Packs/day: 1.00     Years:  14.00     Pack years: 14.00   • Smokeless tobacco: Never Used   Substance and Sexual Activity   • Alcohol use: Yes     Comment: hx:alcohol abuse/ occasional   • Drug use: No       Current Outpatient Medications:   •  Acetaminophen (TYLENOL EXTRA STRENGTH PO), Take  by mouth 3 (Three) Times a Day As Needed., Disp: , Rfl:   •  flunisolide (NASALIDE) 25 MCG/ACT (0.025%) solution nasal spray, 2 SPRAYS, 2 TIMES PER DAY. IN EACH NOSTRIL, Disp: , Rfl: 10  •  fluticasone (FLONASE) 50 MCG/ACT nasal spray, 2 sprays into each nostril Daily., Disp: , Rfl:   •  folic acid (FOLVITE) 1 MG tablet, , Disp: , Rfl:   •  furosemide (LASIX) 20 MG tablet, TAKE 1 TABLET BY MOUTH EVERY DAY, Disp: 90 tablet, Rfl: 0  •  guaiFENesin (Mucinex) 600 MG 12 hr tablet, Take 2 tablets by mouth 2 (Two) Times a Day., Disp: 60 tablet, Rfl: 1  •  LORazepam (Ativan) 0.5 MG tablet, 1 pill po 1 hour prior to procedure, Disp: 5 tablet, Rfl: 0  •  methotrexate 2.5 MG tablet, , Disp: , Rfl:   •  mometasone-formoterol (DULERA 200) 200-5 MCG/ACT inhaler, Inhale 2 puffs 2 (Two) Times a Day., Disp: , Rfl:   •  montelukast (SINGULAIR) 10 MG tablet, Take 10 mg by mouth Every Night., Disp: , Rfl:   •  NIFEdipine XL (PROCARDIA XL) 60 MG 24 hr tablet, TAKE 1 TABLET BY MOUTH EVERY DAY, Disp: 90 tablet, Rfl: 0  •  VENTOLIN  (90 Base) MCG/ACT inhaler, INHALE TWO PUFFS BY MOUTH EVERY 4 HOURS AS NEEDED FOR WHEEZING, Disp: 1 inhaler, Rfl: 5  •  ciprofloxacin (Cipro) 500 MG tablet, Take 1 tablet by mouth 2 (Two) Times a Day., Disp: 28 tablet, Rfl: 0  •  NON FORMULARY, CPAP, Disp: , Rfl:   No Known Allergies     Objective   Vitals:    06/11/20 0855   BP: 147/71   Pulse: 60   Resp: 16   Temp: 97.6 °F (36.4 °C)   SpO2: 97%     Physical Exam   Constitutional: He is oriented to person, place, and time. He appears well-developed and well-nourished.   Patient is confined to a wheelchair.  He is quite obese.  He needs quite a bit of help to move around.   HENT:   Head:  Normocephalic.   Eyes: Pupils are equal, round, and reactive to light. Conjunctivae, EOM and lids are normal.   Neck: Trachea normal and normal range of motion. Neck supple. No hepatojugular reflux and no JVD present. Carotid bruit is not present. No thyroid mass and no thyromegaly present.   Cardiovascular: Normal rate, S1 normal, S2 normal, normal heart sounds and normal pulses. An irregular rhythm present.  No extrasystoles are present. PMI is not displaced.   Pulmonary/Chest: Effort normal and breath sounds normal.   Abdominal: Soft. Normal appearance and bowel sounds are normal. He exhibits no mass. There is no hepatosplenomegaly. There is no tenderness. No hernia.   Musculoskeletal: Normal range of motion.   Neurological: He is alert and oriented to person, place, and time. He has normal strength and normal reflexes. No cranial nerve deficit or sensory deficit. He displays a negative Romberg sign.   Skin: Skin is warm, dry and intact.   Psychiatric: He has a normal mood and affect. His speech is normal and behavior is normal. Judgment and thought content normal. Cognition and memory are normal.     Independent Review of Radiographic Studies:    CT of the chest with contrast performed May 29, 2020 was independently reviewed.  There is a mass in the medial apical aspect of the right upper lobe of the lung.  This appears to have 2 components.  One measures approximately 2-1/2 cm.  The other measures 26 x 23 mm.  There is hilar and mediastinal adenopathy.  There are no pleural effusions.  There is changes of diffuse emphysema and COPD.  Patient has had prior gastric bypass.  There is bilateral adrenal hyperplasia.    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  This is complicated by the fact that he has some sort of neuromuscular disorder that may well be ALS.  I have recommended a CT directed needle biopsy for tissue  diagnosis.  I have also ordered pulmonary function test to assess his breathing capacity.  I have ordered a CT PET scan to complete his staging.  Once this information is available his case will be discussed in our multidisciplinary thoracic oncology conference.  Of note the patient is scheduled for follow-up early next week with his neurologist and hopefully we will have a little better understanding of what his neuromuscular diseases.    I will keep you informed of his progress.  Thank you for allowing us to participate in the care of Mr. Vazquez.      Diagnoses and all orders for this visit:    Lung mass  -     CT Biopsy Lung Mediastinum Right; Future  -     Tissue Pathology Exam; Standing  -     Full Pulmonary Function Test With Bronchodilator & ABG; Future  -     NM Pet Skull Base To Mid Thigh; Future    Anxiety  -     LORazepam (Ativan) 0.5 MG tablet; 1 pill po 1 hour prior to procedure

## 2020-06-11 NOTE — PROGRESS NOTES
Subjective   Patient ID: Ryley Vazquez is a 65 y.o. male is being seen for consultation today at the request of Braulio Sorto MD    History of Present Illness  Dear Colleague,  Ryley Vazquez was seen in the lung care center at [facility] today June 11, 2020 as part of our multidisciplinary thoracic oncology clinic.  I have reviewed his history his x-rays and have examined him.  Thank you for asking us participate in the care of this pleasant gentleman.    The patient is a 65-year-old  male.  He is a lifelong smoker since age 13.  He has smoked 1 pack of cigarettes per day.  He has worked as a  all of his life.  Recently he has developed numbness and paresthesias in both upper extremities.  This is progressed to weakness and paralysis of the upper extremities.  He is now being evaluated for possible ALS.  As part of this evaluation he underwent a cervical and thoracic spine CT scan.  He was found to have a lesion in his right upper lobe.  He has had a formal CT of the chest which shows right upper lobe mass and mediastinal adenopathy.  He is here for further evaluation.    Patient has severe allergies and has been diagnosed with asthmatic bronchitis.  He is on shots, oral medications, and inhalers.  He has a chronic cough with intermittent wheezing.  He has had no hemoptysis.  He has had no hoarseness or change in his voice.  There has been no pleuritic pain.  He has had no unexplained weight loss.  He is very sedentary because of his current illness and it is difficult to assess whether or not he has shortness of breath on exertion.    The following portions of the patient's history were reviewed and updated as appropriate: allergies, current medications, past family history, past medical history, past social history, past surgical history and problem list.  Review of Systems   Constitution: Negative.   HENT: Negative.    Eyes: Negative.    Cardiovascular: Positive for irregular  heartbeat.   Respiratory: Positive for shortness of breath and wheezing.    Endocrine: Negative.    Hematologic/Lymphatic: Negative.    Skin: Negative.    Musculoskeletal: Negative.    Gastrointestinal: Negative.    Genitourinary: Negative.    Neurological: Positive for focal weakness, numbness and paresthesias.   Psychiatric/Behavioral: Negative.    Allergic/Immunologic: Negative.      Patient Active Problem List   Diagnosis   • CARLITOS (obstructive sleep apnea)   • Sinusitis   • Hypertension   • Asthmatic bronchitis   • Arthritis   • Osteoarthritis of multiple joints   • Class 3 severe obesity with body mass index (BMI) of 40.0 to 44.9 in adult (CMS/[facility])   • Cervical spondylosis with myelopathy   • Lung mass     Past Medical History:   Diagnosis Date   • Arthritis     rheumatoid   • Asthmatic bronchitis    • COPD (chronic obstructive pulmonary disease) (CMS/[facility])    • Difficulty walking    • Hypertension    • Movement disorder    • Sinusitis    • Sleep apnea      Past Surgical History:   Procedure Laterality Date   • CERVICAL FUSION N/A 2005   • COLONOSCOPY  2014    polyps   • GASTRIC BYPASS     • HERNIA REPAIR     • ROTATOR CUFF REPAIR Right 1985     Family History   Problem Relation Age of Onset   • Asthma Mother    • Cancer Father         COLON   • Arthritis Father    • Hypertension Father    • Diabetes Father    • Heart disease Brother    • Cancer Other    • Arthritis Other    • Alcohol abuse Other    • Hypertension Maternal Grandmother    • Diabetes Maternal Grandmother    • Stroke Paternal Grandmother    • Hypertension Paternal Grandmother    • Diabetes Paternal Grandmother    • Heart disease Paternal Grandfather      Social History     Socioeconomic History   • Marital status:      Spouse name: Not on file   • Number of children: Not on file   • Years of education: Not on file   • Highest education level: Not on file   Tobacco Use   • Smoking status: Current Every Day Smoker     Packs/day: 1.00     Years:  14.00     Pack years: 14.00   • Smokeless tobacco: Never Used   Substance and Sexual Activity   • Alcohol use: Yes     Comment: hx:alcohol abuse/ occasional   • Drug use: No       Current Outpatient Medications:   •  Acetaminophen (TYLENOL EXTRA STRENGTH PO), Take  by mouth 3 (Three) Times a Day As Needed., Disp: , Rfl:   •  flunisolide (NASALIDE) 25 MCG/ACT (0.025%) solution nasal spray, 2 SPRAYS, 2 TIMES PER DAY. IN EACH NOSTRIL, Disp: , Rfl: 10  •  fluticasone (FLONASE) 50 MCG/ACT nasal spray, 2 sprays into each nostril Daily., Disp: , Rfl:   •  folic acid (FOLVITE) 1 MG tablet, , Disp: , Rfl:   •  furosemide (LASIX) 20 MG tablet, TAKE 1 TABLET BY MOUTH EVERY DAY, Disp: 90 tablet, Rfl: 0  •  guaiFENesin (Mucinex) 600 MG 12 hr tablet, Take 2 tablets by mouth 2 (Two) Times a Day., Disp: 60 tablet, Rfl: 1  •  LORazepam (Ativan) 0.5 MG tablet, 1 pill po 1 hour prior to procedure, Disp: 5 tablet, Rfl: 0  •  methotrexate 2.5 MG tablet, , Disp: , Rfl:   •  mometasone-formoterol (DULERA 200) 200-5 MCG/ACT inhaler, Inhale 2 puffs 2 (Two) Times a Day., Disp: , Rfl:   •  montelukast (SINGULAIR) 10 MG tablet, Take 10 mg by mouth Every Night., Disp: , Rfl:   •  NIFEdipine XL (PROCARDIA XL) 60 MG 24 hr tablet, TAKE 1 TABLET BY MOUTH EVERY DAY, Disp: 90 tablet, Rfl: 0  •  VENTOLIN  (90 Base) MCG/ACT inhaler, INHALE TWO PUFFS BY MOUTH EVERY 4 HOURS AS NEEDED FOR WHEEZING, Disp: 1 inhaler, Rfl: 5  •  ciprofloxacin (Cipro) 500 MG tablet, Take 1 tablet by mouth 2 (Two) Times a Day., Disp: 28 tablet, Rfl: 0  •  NON FORMULARY, CPAP, Disp: , Rfl:   No Known Allergies     Objective   Vitals:    06/11/20 0855   BP: 147/71   Pulse: 60   Resp: 16   Temp: 97.6 °F (36.4 °C)   SpO2: 97%     Physical Exam   Constitutional: He is oriented to person, place, and time. He appears well-developed and well-nourished.   Patient is confined to a wheelchair.  He is quite obese.  He needs quite a bit of help to move around.   HENT:   Head:  Normocephalic.   Eyes: Pupils are equal, round, and reactive to light. Conjunctivae, EOM and lids are normal.   Neck: Trachea normal and normal range of motion. Neck supple. No hepatojugular reflux and no JVD present. Carotid bruit is not present. No thyroid mass and no thyromegaly present.   Cardiovascular: Normal rate, S1 normal, S2 normal, normal heart sounds and normal pulses. An irregular rhythm present.  No extrasystoles are present. PMI is not displaced.   Pulmonary/Chest: Effort normal and breath sounds normal.   Abdominal: Soft. Normal appearance and bowel sounds are normal. He exhibits no mass. There is no hepatosplenomegaly. There is no tenderness. No hernia.   Musculoskeletal: Normal range of motion.   Neurological: He is alert and oriented to person, place, and time. He has normal strength and normal reflexes. No cranial nerve deficit or sensory deficit. He displays a negative Romberg sign.   Skin: Skin is warm, dry and intact.   Psychiatric: He has a normal mood and affect. His speech is normal and behavior is normal. Judgment and thought content normal. Cognition and memory are normal.     Independent Review of Radiographic Studies:    CT of the chest with contrast performed May 29, 2020 was independently reviewed.  There is a mass in the medial apical aspect of the right upper lobe of the lung.  This appears to have 2 components.  One measures approximately 2-1/2 cm.  The other measures 26 x 23 mm.  There is hilar and mediastinal adenopathy.  There are no pleural effusions.  There is changes of diffuse emphysema and COPD.  Patient has had prior gastric bypass.  There is bilateral adrenal hyperplasia.    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  This is complicated by the fact that he has some sort of neuromuscular disorder that may well be ALS.  I have recommended a CT directed needle biopsy for tissue  diagnosis.  I have also ordered pulmonary function test to assess his breathing capacity.  I have ordered a CT PET scan to complete his staging.  Once this information is available his case will be discussed in our multidisciplinary thoracic oncology conference.  Of note the patient is scheduled for follow-up early next week with his neurologist and hopefully we will have a little better understanding of what his neuromuscular diseases.    I will keep you informed of his progress.  Thank you for allowing us to participate in the care of Mr. Vazquez.      Diagnoses and all orders for this visit:    Lung mass  -     CT Biopsy Lung Mediastinum Right; Future  -     Tissue Pathology Exam; Standing  -     Full Pulmonary Function Test With Bronchodilator & ABG; Future  -     NM Pet Skull Base To Mid Thigh; Future    Anxiety  -     LORazepam (Ativan) 0.5 MG tablet; 1 pill po 1 hour prior to procedure

## 2020-06-15 NOTE — H&P (VIEW-ONLY)
Subjective   Ryley Vazquez is a 65 y.o. male.     History of Present Illness   You have chosen to receive care through a telephone visit. Do you consent to use a telephone visit for your medical care today? Yes time spent during visit 10 minutes.   C/o congestion, sinus pressure, he has hx of sinus infections, started about 2 days ago, denies fever, he has drainage, HA, sinus pressure behind his eyes, he states he has PND, cough d/t drainage, did notice some wheezing, denies SOA, with seasonal allergies. Taking flunisolide, sees allergist Dr. Mclaughlin. Also taking singular, using albuterol inhaler as needed, currently using 2-3x per day, usually does not use at all, also using dulera. He denies ear pain or sore throat.         The following portions of the patient's history were reviewed and updated as appropriate: allergies, current medications, past family history, past medical history, past social history, past surgical history and problem list.    Review of Systems   Constitutional: Negative for chills, diaphoresis and fever.   HENT: Positive for congestion, postnasal drip, rhinorrhea and sinus pressure. Negative for ear pain and sore throat.    Respiratory: Positive for cough and wheezing. Negative for shortness of breath.    Cardiovascular: Negative for chest pain.   Musculoskeletal: Negative for arthralgias and myalgias.   Allergic/Immunologic: Positive for environmental allergies.   Neurological: Negative for dizziness, light-headedness and headache.   All other systems reviewed and are negative.      Objective   Physical Exam   Constitutional: He is oriented to person, place, and time.   Pulmonary/Chest: Effort normal.   Neurological: He is alert and oriented to person, place, and time.   physical exam limited d/t telephone visit.       Assessment/Plan   Ryley was seen today for sinusitis.    Diagnoses and all orders for this visit:    Acute maxillary sinusitis, recurrence not specified    Allergic  rhinitis, unspecified seasonality, unspecified trigger    Other orders  -     Discontinue: amoxicillin (AMOXIL) 875 MG tablet; Take 1 tablet by mouth 2 (Two) Times a Day for 7 days.  -     amoxicillin (AMOXIL) 875 MG tablet; Take 1 tablet by mouth 2 (Two) Times a Day for 10 days.        Start amoxicillin 875mg bid for 10 days.   Cont nasal spray and singular,   Cont albuterol and dulera as directed.   May try claritin OTC as needed for drainage.   If symptoms persist 5-7 days call office.   Increase fluid intake, get plenty of rest.   Patient agrees with plan of care and understands instructions. Call if worsening symptoms or any problems or concerns.

## 2020-06-25 NOTE — DISCHARGE INSTRUCTIONS
.EDUCATION /DISCHARGE INSTRUCTIONS  CT/US guided biopsy:  A biopsy is a procedure done to remove tissue for further analysis.  Before images are taken to locate the target area.  Images can be obtained using ultrasound, CT or MRI.  A physician will clean your skin with antiseptic soap, place a sterile towel around the site and administer a local anesthetic to numb the area.  The physician will then insert a special needle.  Sometimes images are taken of the needle after it is inserted to ensure the needle is in the correct area to be biopsied.   A sample is obtained and sent to the laboratory for study.  Occasionally the laboratory is unable to make a diagnosis from the sample and the procedure may need to be repeated.  Within a week the radiologist will send a report to your physician.  A pathologist will also examine the tissue and send a report.    Risks of the procedure include but are not limited to:   *  Bleeding    *  Infection   *  Puncture of surrounding organs *  Death     *  Lung collapse if the biopsy is near the chest which may require insertion of a      chest tube to re-inflate the lung if severe.    Benefits of the procedure:  Using x-ray helps to locate the area that requires a biopsy. The procedure is less invasive than a surgical procedure, there are no large incisions and it does not require anesthesia.    Alternatives to the procedure:  A biopsy can be performed surgically.  Risks of a surgical biopsy include exposure to anesthesia, infection, excessive bleeding and injury to abdominal organs.  A benefit of surgical biopsy is the ability to see the area to be biopsied and remove of a larger piece of tissue.    THIS EDUCATION INFORMATION WAS REVIEWED PRIOR TO PROCEDURE AND CONSENT. Patient initials__________________Time____1147_______________    Post Procedure:    *  Expect the biopsy site may be tender up to one week.    *  Rest today (no pushing pulling or straining).   *  Slowly increase  activity tomorrow.    *  If you received sedation do not drive for 24 hours.   *  Keep dressing clean and dry.   *  Leave dressing on puncture site for 24 hours.    *  You may shower when dressing removed.  Call your doctor if experiencing:   *  Signs of infection such as redness, swelling, excessive pain and / or foul        smelling drainage from the puncture site.   *  Chills or fever over 101 degrees (by mouth).   *  Unrelieved pain.   *  Any new or severe symptoms.   *  If experiencing sudden / severe shortness of breath or chest pain go to the       nearest emergency room.   Following the procedure:     Follow-up with the ordering physician as directed.    Continue to take other medications as directed by your physician unless    otherwise instructed.   If applicable, resume taking your blood thinners or Aspirin on _6/26/20 after 3 pm __________.    If you have any concerns please call the Radiology Nurses Desk at [phone].  You are the most important factor in your recovery.  Follow the above instructions carefully.

## 2020-06-25 NOTE — NURSING NOTE
Biopsy was not done.  Patient could not tolerate laying flat and he could not lay on his left side long enough to get the biopsy done.  He told us he could lay on his left side and not be SOA.  He c/o'd not being able to breathe when sitting on the stretcher and only felt like he could breath when in a chair with his feet hanging down. His IV was D/Adam and he was taken to the car by W/C.  His wife is driving him home.

## 2020-07-02 NOTE — H&P (VIEW-ONLY)
Subjective   Patient ID: Ryley Vazquez is a 65 y.o. male is here today for follow-up.    History of Present Illness  Dear Colleague,  Ryley Vazquez was seen in the lung care center at [facility] today 7/2/2020 as part of her multidisciplinary thoracic oncology clinic.  Since his initial evaluation he was seen at [facility] for a CT directed needle biopsy of his right upper lobe lung mass.  There were multiple problems with obtaining the biopsy and the whole procedure was aborted.  He has also been seen by neurology in they have ruled out ALS but believe that he has some sort of advanced neuropathy.  Follow-up with neurology is for next week.    The following portions of the patient's history were reviewed and updated as appropriate: allergies, current medications, past family history, past medical history, past social history, past surgical history and problem list.  Review of Systems   HENT: Negative.    Eyes: Negative.    Cardiovascular: Negative.    Respiratory: Negative.    Endocrine: Negative.    Hematologic/Lymphatic: Negative.    Skin: Negative.    Musculoskeletal: Positive for stiffness.   Gastrointestinal: Negative.    Genitourinary: Negative.    Neurological: Positive for weakness.   Psychiatric/Behavioral: Negative.    All other systems reviewed and are negative.    Patient Active Problem List   Diagnosis   • CARLITOS (obstructive sleep apnea)   • Sinusitis   • Hypertension   • Asthmatic bronchitis   • Arthritis   • Osteoarthritis of multiple joints   • Class 3 severe obesity with body mass index (BMI) of 40.0 to 44.9 in adult (CMS/[facility])   • Cervical spondylosis with myelopathy   • Lung mass     Past Medical History:   Diagnosis Date   • Arthritis     rheumatoid   • Asthmatic bronchitis    • COPD (chronic obstructive pulmonary disease) (CMS/[facility])    • Difficulty walking    • Hypertension    • Movement disorder    • Sinusitis    • Sleep apnea     cpap     Past Surgical History:    Procedure Laterality Date   • CERVICAL FUSION N/A 2005   • COLONOSCOPY  2014    polyps   • GASTRIC BYPASS     • HERNIA REPAIR     • ROTATOR CUFF REPAIR Right 1985     Family History   Problem Relation Age of Onset   • Asthma Mother    • Cancer Father         COLON   • Arthritis Father    • Hypertension Father    • Diabetes Father    • Heart disease Brother    • Cancer Other    • Arthritis Other    • Alcohol abuse Other    • Hypertension Maternal Grandmother    • Diabetes Maternal Grandmother    • Stroke Paternal Grandmother    • Hypertension Paternal Grandmother    • Diabetes Paternal Grandmother    • Heart disease Paternal Grandfather      Social History     Socioeconomic History   • Marital status:      Spouse name: Not on file   • Number of children: Not on file   • Years of education: Not on file   • Highest education level: Not on file   Occupational History   • Occupation:    Tobacco Use   • Smoking status: Current Every Day Smoker     Packs/day: 1.00     Types: Cigarettes     Start date: 1968   • Smokeless tobacco: Never Used   • Tobacco comment: Started smoking at age 13.   Substance and Sexual Activity   • Alcohol use: Yes     Comment: hx:alcohol abuse/ occasional   • Drug use: No       Current Outpatient Medications:   •  Acetaminophen (TYLENOL EXTRA STRENGTH PO), Take  by mouth 3 (Three) Times a Day As Needed., Disp: , Rfl:   •  flunisolide (NASALIDE) 25 MCG/ACT (0.025%) solution nasal spray, 2 SPRAYS, 2 TIMES PER DAY. IN EACH NOSTRIL, Disp: , Rfl: 10  •  folic acid (FOLVITE) 1 MG tablet, , Disp: , Rfl:   •  furosemide (LASIX) 20 MG tablet, TAKE 1 TABLET BY MOUTH EVERY DAY (Patient taking differently: 20 mg Every Other Day.), Disp: 90 tablet, Rfl: 0  •  guaiFENesin (Mucinex) 600 MG 12 hr tablet, Take 2 tablets by mouth 2 (Two) Times a Day., Disp: 60 tablet, Rfl: 1  •  LORazepam (Ativan) 0.5 MG tablet, 1 pill po 1 hour prior to procedure, Disp: 5 tablet, Rfl: 0  •  methotrexate 2.5 MG  tablet, Takes every Monday - not taking now - on Amoxicillin, Disp: , Rfl:   •  mometasone-formoterol (DULERA 200) 200-5 MCG/ACT inhaler, Inhale 2 puffs 2 (Two) Times a Day., Disp: , Rfl:   •  montelukast (SINGULAIR) 10 MG tablet, Take 10 mg by mouth Every Night., Disp: , Rfl:   •  NIFEdipine XL (PROCARDIA XL) 60 MG 24 hr tablet, TAKE 1 TABLET BY MOUTH EVERY DAY, Disp: 90 tablet, Rfl: 0  •  NON FORMULARY, CPAP, Disp: , Rfl:   •  VENTOLIN  (90 Base) MCG/ACT inhaler, INHALE TWO PUFFS BY MOUTH EVERY 4 HOURS AS NEEDED FOR WHEEZING, Disp: 1 inhaler, Rfl: 5  No Known Allergies     Objective   Vitals:    07/02/20 0904   BP: 138/67   Pulse: 82   Resp: 16   Temp: 98 °F (36.7 °C)   SpO2: 97%     Physical Exam   Constitutional: He is oriented to person, place, and time. He appears well-developed and well-nourished.   HENT:   Head: Normocephalic.   Eyes: Pupils are equal, round, and reactive to light. Conjunctivae, EOM and lids are normal.   Neck: Trachea normal and normal range of motion. Neck supple. No hepatojugular reflux and no JVD present. Carotid bruit is not present. No thyroid mass and no thyromegaly present.   Cardiovascular: Normal rate, regular rhythm, S1 normal, S2 normal, normal heart sounds and normal pulses.  No extrasystoles are present. PMI is not displaced.   Pulmonary/Chest: Effort normal and breath sounds normal.   Abdominal: Soft. Normal appearance and bowel sounds are normal. He exhibits no mass. There is no hepatosplenomegaly. There is no tenderness. No hernia.   Musculoskeletal: Normal range of motion.   Neurological: He is alert and oriented to person, place, and time. He has normal strength and normal reflexes. No cranial nerve deficit or sensory deficit. He displays a negative Romberg sign.   Generalized weakness   Skin: Skin is warm, dry and intact.   Psychiatric: He has a normal mood and affect. His speech is normal and behavior is normal. Judgment and thought content normal. Cognition and  memory are normal.     Assessment/Plan     Needle biopsy was aborted and patient refuses to go back for second attempt.  Lesion is in such a position that bronchoscopy would not be helpful in obtaining a biopsy.  Believe that the next step would be a mediastinoscopy with biopsy of the mediastinal lymph nodes.  I have explained this procedure to the patient and his wife.  We have discussed the risks and benefits.  He has requested that we proceed.    Patient will be scheduled for mediastinoscopy with biopsy at [facility] in the near future.    I will keep you informed of his progress.    Diagnoses and all orders for this visit:    Lung mass  -     Case Request

## 2020-07-02 NOTE — PATIENT INSTRUCTIONS
Pt seen by Dr. Jones and will be scheduled for a MEDE w/bx and will follow up with Dr. Jones. Ivy will call pt will surgery information. Pt given information regarding MEDE procedure.  Pt instructed to call nurse navigator with any questions or concerns. Pt given contact cards for Dr. Jones and nurse navigator.

## 2020-07-02 NOTE — PROGRESS NOTES
Subjective   Patient ID: Ryley Vazquez is a 65 y.o. male is here today for follow-up.    History of Present Illness  Dear Colleague,  Ryley Vazquez was seen in the lung care center at [facility] today 7/2/2020 as part of her multidisciplinary thoracic oncology clinic.  Since his initial evaluation he was seen at [facility] for a CT directed needle biopsy of his right upper lobe lung mass.  There were multiple problems with obtaining the biopsy and the whole procedure was aborted.  He has also been seen by neurology in they have ruled out ALS but believe that he has some sort of advanced neuropathy.  Follow-up with neurology is for next week.    The following portions of the patient's history were reviewed and updated as appropriate: allergies, current medications, past family history, past medical history, past social history, past surgical history and problem list.  Review of Systems   HENT: Negative.    Eyes: Negative.    Cardiovascular: Negative.    Respiratory: Negative.    Endocrine: Negative.    Hematologic/Lymphatic: Negative.    Skin: Negative.    Musculoskeletal: Positive for stiffness.   Gastrointestinal: Negative.    Genitourinary: Negative.    Neurological: Positive for weakness.   Psychiatric/Behavioral: Negative.    All other systems reviewed and are negative.    Patient Active Problem List   Diagnosis   • CARLITOS (obstructive sleep apnea)   • Sinusitis   • Hypertension   • Asthmatic bronchitis   • Arthritis   • Osteoarthritis of multiple joints   • Class 3 severe obesity with body mass index (BMI) of 40.0 to 44.9 in adult (CMS/Formerly [facility])   • Cervical spondylosis with myelopathy   • Lung mass     Past Medical History:   Diagnosis Date   • Arthritis     rheumatoid   • Asthmatic bronchitis    • COPD (chronic obstructive pulmonary disease) (CMS/Formerly [facility])    • Difficulty walking    • Hypertension    • Movement disorder    • Sinusitis    • Sleep apnea     cpap     Past Surgical History:    Procedure Laterality Date   • CERVICAL FUSION N/A 2005   • COLONOSCOPY  2014    polyps   • GASTRIC BYPASS     • HERNIA REPAIR     • ROTATOR CUFF REPAIR Right 1985     Family History   Problem Relation Age of Onset   • Asthma Mother    • Cancer Father         COLON   • Arthritis Father    • Hypertension Father    • Diabetes Father    • Heart disease Brother    • Cancer Other    • Arthritis Other    • Alcohol abuse Other    • Hypertension Maternal Grandmother    • Diabetes Maternal Grandmother    • Stroke Paternal Grandmother    • Hypertension Paternal Grandmother    • Diabetes Paternal Grandmother    • Heart disease Paternal Grandfather      Social History     Socioeconomic History   • Marital status:      Spouse name: Not on file   • Number of children: Not on file   • Years of education: Not on file   • Highest education level: Not on file   Occupational History   • Occupation:    Tobacco Use   • Smoking status: Current Every Day Smoker     Packs/day: 1.00     Types: Cigarettes     Start date: 1968   • Smokeless tobacco: Never Used   • Tobacco comment: Started smoking at age 13.   Substance and Sexual Activity   • Alcohol use: Yes     Comment: hx:alcohol abuse/ occasional   • Drug use: No       Current Outpatient Medications:   •  Acetaminophen (TYLENOL EXTRA STRENGTH PO), Take  by mouth 3 (Three) Times a Day As Needed., Disp: , Rfl:   •  flunisolide (NASALIDE) 25 MCG/ACT (0.025%) solution nasal spray, 2 SPRAYS, 2 TIMES PER DAY. IN EACH NOSTRIL, Disp: , Rfl: 10  •  folic acid (FOLVITE) 1 MG tablet, , Disp: , Rfl:   •  furosemide (LASIX) 20 MG tablet, TAKE 1 TABLET BY MOUTH EVERY DAY (Patient taking differently: 20 mg Every Other Day.), Disp: 90 tablet, Rfl: 0  •  guaiFENesin (Mucinex) 600 MG 12 hr tablet, Take 2 tablets by mouth 2 (Two) Times a Day., Disp: 60 tablet, Rfl: 1  •  LORazepam (Ativan) 0.5 MG tablet, 1 pill po 1 hour prior to procedure, Disp: 5 tablet, Rfl: 0  •  methotrexate 2.5 MG  tablet, Takes every Monday - not taking now - on Amoxicillin, Disp: , Rfl:   •  mometasone-formoterol (DULERA 200) 200-5 MCG/ACT inhaler, Inhale 2 puffs 2 (Two) Times a Day., Disp: , Rfl:   •  montelukast (SINGULAIR) 10 MG tablet, Take 10 mg by mouth Every Night., Disp: , Rfl:   •  NIFEdipine XL (PROCARDIA XL) 60 MG 24 hr tablet, TAKE 1 TABLET BY MOUTH EVERY DAY, Disp: 90 tablet, Rfl: 0  •  NON FORMULARY, CPAP, Disp: , Rfl:   •  VENTOLIN  (90 Base) MCG/ACT inhaler, INHALE TWO PUFFS BY MOUTH EVERY 4 HOURS AS NEEDED FOR WHEEZING, Disp: 1 inhaler, Rfl: 5  No Known Allergies     Objective   Vitals:    07/02/20 0904   BP: 138/67   Pulse: 82   Resp: 16   Temp: 98 °F (36.7 °C)   SpO2: 97%     Physical Exam   Constitutional: He is oriented to person, place, and time. He appears well-developed and well-nourished.   HENT:   Head: Normocephalic.   Eyes: Pupils are equal, round, and reactive to light. Conjunctivae, EOM and lids are normal.   Neck: Trachea normal and normal range of motion. Neck supple. No hepatojugular reflux and no JVD present. Carotid bruit is not present. No thyroid mass and no thyromegaly present.   Cardiovascular: Normal rate, regular rhythm, S1 normal, S2 normal, normal heart sounds and normal pulses.  No extrasystoles are present. PMI is not displaced.   Pulmonary/Chest: Effort normal and breath sounds normal.   Abdominal: Soft. Normal appearance and bowel sounds are normal. He exhibits no mass. There is no hepatosplenomegaly. There is no tenderness. No hernia.   Musculoskeletal: Normal range of motion.   Neurological: He is alert and oriented to person, place, and time. He has normal strength and normal reflexes. No cranial nerve deficit or sensory deficit. He displays a negative Romberg sign.   Generalized weakness   Skin: Skin is warm, dry and intact.   Psychiatric: He has a normal mood and affect. His speech is normal and behavior is normal. Judgment and thought content normal. Cognition and  memory are normal.     Assessment/Plan     Needle biopsy was aborted and patient refuses to go back for second attempt.  Lesion is in such a position that bronchoscopy would not be helpful in obtaining a biopsy.  Believe that the next step would be a mediastinoscopy with biopsy of the mediastinal lymph nodes.  I have explained this procedure to the patient and his wife.  We have discussed the risks and benefits.  He has requested that we proceed.    Patient will be scheduled for mediastinoscopy with biopsy at [facility] in the near future.    I will keep you informed of his progress.    Diagnoses and all orders for this visit:    Lung mass  -     Case Request

## 2020-07-08 NOTE — DISCHARGE INSTRUCTIONS
Take the following medications the morning of surgery:      IPRATROPIUM BROMIDE (ATROVENT)  INHALATION SOLUTION  VENTOLIN INHALER   MOMETASONE-FORMOTEROL (DULERA) INHALER  NIFEDIPINE (PROCARDIA)  LORAZEPAM (ATIVAN)    General Instructions:  • Do not eat solid food after midnight the night before surgery.  • You may drink clear liquids day of surgery but must stop at least one hour before your hospital arrival time. @ 0900 AM STOP DRINKING!!!  • It is beneficial for you to have a clear drink that contains carbohydrates the day of surgery.  We suggest a 12 to 20 ounce bottle of Gatorade or Powerade for non-diabetic patients or a 12 to 20 ounce bottle of G2 or Powerade Zero for diabetic patients.     Clear liquids are liquids you can see through.  Nothing red in color.     Plain water                               Sports drinks  Sodas                                   Gelatin (Jell-O)  Fruit juices without pulp such as white grape juice and apple juice  Popsicles that contain no fruit or yogurt  Tea or coffee (no cream or milk added)  Gatorade / Powerade  G2 / Powerade Zero    • Patients who avoid smoking, chewing tobacco and alcohol for 4 weeks prior to surgery have a reduced risk of post-operative complications.  Quit smoking as many days before surgery as you can.  • Do not smoke, use chewing tobacco or drink alcohol the day of surgery.   • If applicable bring your C-PAP/ BI-PAP machine.  • Bring any papers given to you in the doctor’s office.  • Wear clean comfortable clothes.  • Do not wear contact lenses, false eyelashes or make-up.  Bring a case for your glasses.   • Bring crutches or walker if applicable.  • Remove all piercings.  Leave jewelry and any other valuables at home.  • Hair extensions with metal clips must be removed prior to surgery.  • The Pre-Admission Testing nurse will instruct you to bring medications if unable to obtain an accurate list in Pre-Admission Testing.        Preventing a Surgical  Site Infection:  • For 2 to 3 days before surgery, avoid shaving with a razor because the razor can irritate skin and make it easier to develop an infection.    • Any areas of open skin can increase the risk of a post-operative wound infection by allowing bacteria to enter and travel throughout the body.  Notify your surgeon if you have any skin wounds / rashes even if it is not near the expected surgical site.  The area will need assessed to determine if surgery should be delayed until it is healed.  • The night prior to surgery shower using a fresh bar of anti-bacterial soap (such as Dial) and clean washcloth.  Sleep in a clean bed with clean clothing.  Do not allow pets to sleep with you.  • Shower on the morning of surgery using a fresh bar of anti-bacterial soap (such as Dial) and clean washcloth.  Dry with a clean towel and dress in clean clothing.  • Ask your surgeon if you will be receiving antibiotics prior to surgery.  • Make sure you, your family, and all healthcare providers clean their hands with soap and water or an alcohol based hand  before caring for you or your wound.    Day of surgery:07- ARRIVE @ 1000 AM REPORT TO THE MAIN OR   Your arrival time is approximately two hours before your scheduled surgery time.  Upon arrival, a Pre-op nurse and Anesthesiologist will review your health history, obtain vital signs, and answer questions you may have.  The only belongings needed at this time will be a list of your home medications and if applicable your C-PAP/BI-PAP machine.  If you are staying overnight your family can leave the rest of your belongings in the car and bring them to your room later.  A Pre-op nurse will start an IV and you may receive medication in preparation for surgery, including something to help you relax.  Your family will be able to see you in the Pre-op area.  Two visitors at a time will be allowed in the Pre-op room.  While you are in surgery your family should  notify the waiting room  if they leave the waiting room area and provide a contact phone number.    Please be aware that surgery does come with discomfort.  We want to make every effort to control your discomfort so please discuss any uncontrolled symptoms with your nurse.   Your doctor will most likely have prescribed pain medications.      If you are going home after surgery you will receive individualized written care instructions before being discharged.  A responsible adult must drive you to and from the hospital on the day of your surgery and stay with you for 24 hours.    If you are staying overnight following surgery, you will be transported to your hospital room following the recovery period.  [facility] has all private rooms.    If you have any questions please call Pre-Admission Testing at [phone].  Deductibles and co-payments are collected on the day of service. Please be prepared to pay the required co-pay, deductible or deposit on the day of service as defined by your plan.    Patient Education for Self-Quarantine Process    Following your COVID testing, we strongly recommend that you do not leave your home after you have been tested for COVID except to get medical care. This includes not going to work, school or to public areas.  If this is not possible for you to do please limit your activities to only required outings.  Be sure to wear a mask when you are with other people, practice social distancing and wash your hands frequently.      The following items provide additional details to keep you safe.  • Wash your hands with soap and water frequently for at least 20 seconds.   • Avoid touching your eyes, nose and mouth with unwashed hands.  • Do not share anything - utensils, towels, food from the same bowl.   • Have your own utensils, drinking glass, dishes, towels and bedding.   • Do not have visitors.   • Do use FaceTime to stay in touch with family and  friends.  • You should stay in a specific room away from others if possible.   • Stay at least 6 feet away from others in the home if you cannot have a dedicated room to yourself.   • Do not snuggle with your pet. While the CDC says there is no evidence that pets can spread COVID-19 or be infected from humans, it is probably best to avoid “petting, snuggling, being kissed or licked and sharing food (during self-quarantine)”, according to the CDC.   • Sanitize household surfaces daily. Include all high touch areas (door handles, light switches, phones, countertops, etc.)  • Do not share a bathroom with others, if possible.   • Wear a mask around others in your home if you are unable to stay in a separate room or 6 feet apart. If  you are unable to wear a mask, have your family member wear a mask if they must be within 6 feet of you.       Call your surgeon immediately if you experience any of the following symptoms:  • Sore Throat  • Shortness of Breath or difficulty breathing  • Cough  • Chills  • Body soreness or muscle pain  • Headache  • Fever  • New loss of taste or smell  • Do not arrive for your surgery ill.  Your procedure will need to be rescheduled to another time.  You will need to call your physician before the day of surgery to avoid any unnecessary exposure to hospital staff as well as other patients.        CHLORHEXIDINE CLOTH INSTRUCTIONS  The morning of surgery follow these instructions using the Chlorhexidine cloths you've been given.  These steps reduce bacteria on the body.  Do not use the cloths near your eyes, ears mouth, genitalia or on open wounds.  Throw the cloths away after use but do not try to flush them down a toilet.      • Open and remove one cloth at a time from the package.    • Leave the cloth unfolded and begin the bathing.  • Massage the skin with the cloths using gentle pressure to remove bacteria.  Do not scrub harshly.   • Follow the steps below with one 2% CHG cloth per area  (6 total cloths).  • One cloth for neck, shoulders and chest.  • One cloth for both arms, hands, fingers and underarms (do underarms last).  • One cloth for the abdomen followed by groin.  • One cloth for right leg and foot including between the toes.  • One cloth for left leg and foot including between the toes.  • The last cloth is to be used for the back of the neck, back and buttocks.    Allow the CHG to air dry 3 minutes on the skin which will give it time to work and decrease the chance of irritation.  The skin may feel sticky until it is dry.  Do not rinse with water or any other liquid or you will lose the beneficial effects of the CHG.  If mild skin irritation occurs, do rinse the skin to remove the CHG.  Report this to the nurse at time of admission.  Do not apply lotions, creams, ointments, deodorants or perfumes after using the clothes. Dress in clean clothes before coming to the hospital.

## 2020-07-10 NOTE — ANESTHESIA PREPROCEDURE EVALUATION
Anesthesia Evaluation                  Airway   Mallampati: II  TM distance: >3 FB  Neck ROM: full  No difficulty expected  Dental    (+) poor dentition    Pulmonary - normal exam   (+) a smoker Current, lung cancer, COPD, asthma,sleep apnea,   Cardiovascular - normal exam    (+) hypertension, hyperlipidemia,       Neuro/Psych  (+) numbness, psychiatric history,     GI/Hepatic/Renal/Endo    (+)  GERD,      Musculoskeletal     Abdominal    Substance History       Comment: Recovering alcoholic   OB/GYN          Other   arthritis,        Other Comment: Rheumatoid arthritis                  Anesthesia Plan    ASA 3     general     intravenous induction     Anesthetic plan, all risks, benefits, and alternatives have been provided, discussed and informed consent has been obtained with: patient.

## 2020-07-10 NOTE — ANESTHESIA POSTPROCEDURE EVALUATION
"Patient: Ryley Vazquez Jr.    Procedure Summary     Date:  07/10/20 Room / Location:  [facility] OR 08 / [facility] MAIN OR    Anesthesia Start:  1410 Anesthesia Stop:  1548    Procedure:  MEDIASTINOSCOPY WITH BIOPSY (N/A Chest) Diagnosis:       Lung mass      (Lung mass [R91.8])    Surgeon:  Bayron Jones III, MD Provider:  Nic Banks MD    Anesthesia Type:  general ASA Status:  3          Anesthesia Type: general    Vitals  Vitals Value Taken Time   /82 7/10/2020  4:30 PM   Temp 36.5 °C (97.7 °F) 7/10/2020  3:44 PM   Pulse 69 7/10/2020  4:40 PM   Resp 16 7/10/2020  4:30 PM   SpO2 96 % 7/10/2020  4:40 PM   Vitals shown include unvalidated device data.        Post Anesthesia Care and Evaluation    Patient location during evaluation: bedside  Pain management: adequate  Airway patency: patent  Anesthetic complications: No anesthetic complications    Cardiovascular status: acceptable  Respiratory status: acceptable  Hydration status: acceptable    Comments: /82   Pulse 73   Temp 36.5 °C (97.7 °F) (Oral)   Resp 16   Ht 170.2 cm (67\")   Wt 111 kg (245 lb)   SpO2 96%   BMI 38.37 kg/m²         "

## 2020-07-10 NOTE — ANESTHESIA PROCEDURE NOTES
Airway  Urgency: elective    Date/Time: 7/10/2020 2:30 PM  Airway not difficult    General Information and Staff    Patient location during procedure: OR  Anesthesiologist: Nic Banks MD  CRNA: Nicole Morrow CRNA    Indications and Patient Condition  Indications for airway management: airway protection    Preoxygenated: yes  MILS maintained throughout  Mask difficulty assessment: 2 - vent by mask + OA or adjuvant +/- NMBA    Final Airway Details  Final airway type: endotracheal airway      Successful airway: ETT  Cuffed: yes   Successful intubation technique: direct laryngoscopy  Facilitating devices/methods: intubating stylet  Endotracheal tube insertion site: oral  Blade: Karel  Blade size: 4  ETT size (mm): 7.5  Cormack-Lehane Classification: grade IIa - partial view of glottis  Placement verified by: chest auscultation and capnometry   Cuff volume (mL): 10  Measured from: lips  ETT/EBT  to lips (cm): 22  Number of attempts at approach: 1  Assessment: lips, teeth, and gum same as pre-op and atraumatic intubation    Additional Comments  Atraumatic ET Tube placement.  Teeth as pre-op. BLEBS.  -ABD sounds.  +ET CO2.  Secured to face

## 2020-07-10 NOTE — PERIOPERATIVE NURSING NOTE
RN updated pt on call to Dr Jones & asked if he wanted or felt he needed a nebulizer treatment prior to discharge.  Pt states that he is able to breathe easier sitting on the side of the bed & he is able to cough easier.  Pt declined need or desire for nebulizer treatment & prefers to do at home prior to bed to help him rest better tonight.  RN auscultated lungs & pt did not have wheezing.

## 2020-07-10 NOTE — OP NOTE
MEDIASTINOSCOPY  Progress Note    Ryley Vazquez Jr.  7/10/2020    Pre-op Diagnosis:   Lung mass [R91.8]       Post-Op Diagnosis Codes:     * Lung mass [R91.8]    Procedure/CPT® Codes:      Procedure(s):  MEDIASTINOSCOPY WITH BIOPSY    Surgeon(s):  Bayron Jones III, MD    Anesthesia: General    Staff:   Circulator: Arnie Serrano RN  Scrub Person: Danna Oropeza Raymond  Orientee: Desiree hWaley PCT    Estimated Blood Loss: minimal    Urine Voided: * No values recorded between 7/10/2020  2:10 PM and 7/10/2020  3:43 PM *    Specimens:                Specimens     ID Source Type Tests Collected By Collected At Frozen?      A Lymph Node Tissue · TISSUE PATHOLOGY EXAM   Bayron Jones III, MD 7/10/20 5160 Yes     Description: R2 LYMPH NODES FOR DIAGNOSIS, OR ROOM 8 PHONE# [phone]    B Lymph Node Tissue · TISSUE PATHOLOGY EXAM   Bayron Jones III, MD 7/10/20 8123 No     Description: R2 LYMPH NODES          Findings: Extensive lymphadenopathy throughout the mediastinum.  Or to lymph nodes on frozen section consistent with malignancy suspected small cell cancer of the lung.    Complications: none    Summary of procedure: Mr Vazquez was brought to the operating room and placed on the operating table in the supine position.  Following the induction of adequate general endotracheal anesthesia a roll was placed behind the shoulders to extend the neck.  Neck chest and shoulders were prepped and draped in usual sterile manner.  An area 1 fingerbreadth above the sternal notch was infiltrated with quarter percent Marcaine.  A transverse incision was made and carried down through the skin and the subcutaneous tissues and through the platysma muscle.  The strap muscles were split longitudinally.  Pretracheal fascia was entered.  Blunt dissection was carried out into the mediastinum.  The video mediastinoscope was introduced into the mediastinum.  Dissection revealed extensive lymph nodes throughout the  R2 and are for stations.  Multiple biopsies were taken.  Frozen section showed malignancy most consistent with small cell cancer.  Hemostasis was obtained.  The wound was closed in layers with 3-0 Vicryl and 4-0 Vicryl.  Sterile dressing was applied.  Patient was awakened and extubated in the operating room and transported to the recovery room in satisfactory condition having tolerated procedure well.  Sponge instrument and needle counts were correct.    .        Dictated utilizing Dragon dictation      Bayron Jones III, MD     Date: 7/10/2020  Time: 15:54

## 2020-07-10 NOTE — PERIOPERATIVE NURSING NOTE
RN notified Dr Jones of pt discomfort being in bed.  Bedrest is ordered until 1845, but pt requests to sit on the side of the bed.  Dr Jones confirms that this would be okay to do.  Dr Jones also confirmed it would be okay to get pt into his wheelchair or a chair if he would be more comfortable.  Pt is to stay in Phase II until 1845 for monitoring.  Should pt need a nebulizer treatment, okay to give a treatment of nebulizer pt takes at home.

## 2020-07-14 PROBLEM — C34.11 SMALL CELL LUNG CANCER, RIGHT UPPER LOBE (HCC): Status: ACTIVE | Noted: 2020-01-01

## 2020-07-14 NOTE — PROGRESS NOTES
Subjective   Patient ID: Ryley Vazquez Jr. is a 65 y.o. male is here today for follow-up.    History of Present Illness  Dear Colleague,  Ryley Vazquez Jr. was seen in our office today for follow-up of a recent mediastinoscopy with lymph node biopsy.  Patient tolerated the procedure well.  He was discharged home the same day.  Since discharge she has been doing well.  He reports breath which is similar to what it was prior to biopsy.  He has a soreness in his chest.  It is painful to swallow.  The symptoms are improving.  The patient has had no fever chills or night sweats.  He has had no hemoptysis.  There has been no drainage from his incision.    The following portions of the patient's history were reviewed and updated as appropriate: allergies, current medications, past family history, past medical history, past social history, past surgical history and problem list.  Review of Systems   Constitution: Positive for decreased appetite.   HENT: Negative.    Eyes: Negative.    Cardiovascular: Positive for chest pain.   Respiratory: Negative.    Endocrine: Negative.    Hematologic/Lymphatic: Negative.    Skin: Negative.    Musculoskeletal: Negative.    Gastrointestinal: Negative.    Genitourinary: Negative.    Neurological: Negative.    Psychiatric/Behavioral: Negative.    Allergic/Immunologic: Negative.      Patient Active Problem List   Diagnosis   • CARLITOS (obstructive sleep apnea)   • Sinusitis   • Hypertension   • Asthmatic bronchitis   • Arthritis   • Osteoarthritis of multiple joints   • Class 3 severe obesity with body mass index (BMI) of 40.0 to 44.9 in adult (CMS/[facility])   • Cervical spondylosis with myelopathy   • Small cell lung cancer, right upper lobe (CMS/[facility])     Past Medical History:   Diagnosis Date   • Arthritis     rheumatoid   • Asthmatic bronchitis    • Cancer (CMS/[facility])    • COPD (chronic obstructive pulmonary disease) (CMS/[facility])    • Difficulty walking    • GERD (gastroesophageal reflux disease)     • Hyperlipidemia    • Hypertension    • Lung cancer (CMS/HCC)     STAGE III   • Movement disorder    • 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HCC)    • Numbness and tingling of upper and lower extremities of both sides    • Recovering alcoholic (CMS/HCC) 07/22/2019    LAST DRINK 07-   • Sinusitis    • Sleep apnea     cpap     Past Surgical History:   Procedure Laterality Date   • CERVICAL FUSION N/A 2005   • COLONOSCOPY  2014    polyps   • GASTRIC BYPASS     • HERNIA REPAIR     • MEDIASTINOSCOPY N/A 7/10/2020    Procedure: MEDIASTINOSCOPY WITH BIOPSY;  Surgeon: Bayron Jones III, MD;  Location: [facility] OR;  Service: Thoracic;  Laterality: N/A;   • ROTATOR CUFF REPAIR Right 1985     Family History   Problem Relation Age of Onset   • Asthma Mother    • Cancer Father         COLON   • Arthritis Father    • Hypertension Father    • Diabetes Father    • Heart disease Brother    • Cancer Other    • Arthritis Other    • Alcohol abuse Other    • Hypertension Maternal Grandmother    • Diabetes Maternal Grandmother    • Stroke Paternal Grandmother    • Hypertension Paternal Grandmother    • Diabetes Paternal Grandmother    • Heart disease Paternal Grandfather    • Malig Hyperthermia Neg Hx      Social History     Socioeconomic History   • Marital status:      Spouse name: Not on file   • Number of children: Not on file   • Years of education: Not on file   • Highest education level: Not on file   Occupational History   • Occupation:    Tobacco Use   • Smoking status: Current Every Day Smoker     Packs/day: 1.00     Years: 50.00     Pack years: 50.00     Types: Cigarettes     Start date: 1968   • Smokeless tobacco: Never Used   • Tobacco comment: Started smoking at age 13.   Substance and Sexual Activity   • Alcohol use: Yes     Comment: RECOVERING ALCOHOLIC 07-   • Drug use: No   • Sexual activity: Defer       Current Outpatient Medications:   •  Acetaminophen (TYLENOL EXTRA STRENGTH PO),  Take  by mouth 3 (Three) Times a Day As Needed., Disp: , Rfl:   •  flunisolide (NASALIDE) 25 MCG/ACT (0.025%) solution nasal spray, 1 spray Every 12 (Twelve) Hours., Disp: , Rfl: 10  •  folic acid (FOLVITE) 1 MG tablet, Take 1 mg by mouth Daily., Disp: , Rfl:   •  furosemide (LASIX) 20 MG tablet, TAKE 1 TABLET BY MOUTH EVERY DAY (Patient taking differently: Take 20 mg by mouth Every Other Day.), Disp: 90 tablet, Rfl: 0  •  guaiFENesin (Mucinex) 600 MG 12 hr tablet, Take 2 tablets by mouth 2 (Two) Times a Day., Disp: 60 tablet, Rfl: 1  •  ipratropium (ATROVENT) 0.02 % nebulizer solution, Take 500 mcg by nebulization 4 (Four) Times a Day., Disp: , Rfl:   •  LORazepam (Ativan) 0.5 MG tablet, 1 pill po 1 hour prior to procedure, Disp: 5 tablet, Rfl: 0  •  methotrexate 2.5 MG tablet, Take 17.5 mg by mouth 1 (One) Time Per Week. FRIDAY, Disp: , Rfl:   •  mometasone-formoterol (DULERA 200) 200-5 MCG/ACT inhaler, Inhale 2 puffs 2 (Two) Times a Day., Disp: , Rfl:   •  montelukast (SINGULAIR) 10 MG tablet, Take 10 mg by mouth Every Night., Disp: , Rfl:   •  NIFEdipine XL (PROCARDIA XL) 60 MG 24 hr tablet, TAKE 1 TABLET BY MOUTH EVERY DAY (Patient taking differently: Take 60 mg by mouth 2 (two) times a day.), Disp: 90 tablet, Rfl: 0  •  VENTOLIN  (90 Base) MCG/ACT inhaler, INHALE TWO PUFFS BY MOUTH EVERY 4 HOURS AS NEEDED FOR WHEEZING (Patient taking differently: Inhale 1 puff Every 4 (Four) Hours As Needed.), Disp: 1 inhaler, Rfl: 5  •  acetaminophen (TYLENOL) 500 MG tablet, Take 1,000 mg by mouth Every 6 (Six) Hours As Needed for Mild Pain ., Disp: , Rfl:   •  cephalexin (Keflex) 250 MG capsule, Take 1 capsule by mouth 4 (Four) Times a Day for 10 days., Disp: 40 capsule, Rfl: 0  No Known Allergies     Objective   Vitals:    07/14/20 1444   BP: 132/78   Pulse: 72   Temp: 97.3 °F (36.3 °C)   SpO2: 98%     Physical Exam   Constitutional: He is oriented to person, place, and time. He appears well-developed and  well-nourished.   HENT:   Head: Normocephalic.   Eyes: Pupils are equal, round, and reactive to light. Conjunctivae, EOM and lids are normal.   Neck: Trachea normal and normal range of motion. Neck supple. No hepatojugular reflux and no JVD present. Carotid bruit is not present. No thyroid mass and no thyromegaly present.   Incision is healing as expected.  No redness tenderness or drainage.  No other signs of infection.   Cardiovascular: Normal rate, regular rhythm, S1 normal, S2 normal, normal heart sounds and normal pulses.  No extrasystoles are present. PMI is not displaced.   Pulmonary/Chest: Effort normal and breath sounds normal.   Abdominal: Soft. Normal appearance and bowel sounds are normal. He exhibits no mass. There is no hepatosplenomegaly. There is no tenderness. No hernia.   Musculoskeletal: Normal range of motion.   Neurological: He is alert and oriented to person, place, and time. He has normal strength and normal reflexes. No cranial nerve deficit or sensory deficit. He displays a negative Romberg sign.   Skin: Skin is warm, dry and intact.   Psychiatric: He has a normal mood and affect. His speech is normal and behavior is normal. Judgment and thought content normal. Cognition and memory are normal.     Pathology:    Final Diagnosis   1.  Lymph Node, R2 Lymph Nodes, Biopsy:                 A.  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     2. Lymph Nodes, R2 Lymph Nodes, Biopsy:                A.  High grade neuroendocrine carcinoma, (small cell carcinoma)     radha/brb        Assessment/Plan   Assessment:  Patient has at least stage III small cell carcinoma of the right upper lobe of the lung.  His case was discussed in our multidisciplinary thoracic oncology conference.  He has already had an MRI of the brain which showed no evidence of metastatic disease.  He will need a CT PET scan to complete his staging.  We have made an appointment for him to see the medical oncology group.   I have discussed with the patient placement of a right subclavian venous access port.  I have explained the procedure as well as the risks and benefits.  I have answered all of his questions.  He has requested that we proceed.  Case request and preoperative orders have been placed in epic.    Plan:    Refer to Dr. Sandoval of medical oncology.  CT PET scan for staging.  Case request and preoperative orders placed in epic for port placement    Diagnoses and all orders for this visit:    Small cell lung cancer, right upper lobe (CMS/HCC)  -     NM Pet Skull Base To Mid Thigh; Future  -     Ambulatory Referral to Oncology  -     Case Request    Other orders  -     cephalexin (Keflex) 250 MG capsule; Take 1 capsule by mouth 4 (Four) Times a Day for 10 days.

## 2020-07-14 NOTE — TELEPHONE ENCOUNTER
PATIENTS WIFE IS CALLING SHE STATES SHE NEEDS DR. SWIFT NURSE TO CALL [facility] AND GIVE THEM SOME NUMBERS, SHE IS TRYING TO GET A BED FOR HER  AND THEY ARE REQUESTING MORE INFORMATION SO SHE CAN GET IT.    CALLBACK NUMBER FOR HER IS:  [phone]

## 2020-07-21 NOTE — TELEPHONE ENCOUNTER
----- Message from Lisa Borden MA sent at 7/21/2020  1:39 PM EDT -----  appt needs rescheduled to next week. Mid morning .  Any day.   He has fever 101 and prostitis.

## 2020-07-21 NOTE — ADDENDUM NOTE
Addendum  created 07/21/20 1024 by Bull Titus MD    Attestation recorded in Intraprocedure, Intraprocedure Attestations filed

## 2020-07-21 NOTE — TELEPHONE ENCOUNTER
PT IS RUNNING A FEVER AND CANNOT MAKE THIS APPT HE WANTED TO BE RESCHEDULED RESCHEDULED PT OUT TO 8/6/20 - AT PT REQUEST SO THAT HE CAN BE WITH DR RODRIGUEZ

## 2020-08-04 NOTE — TELEPHONE ENCOUNTER
"PTS WIFE ADVISED THAT [facility] TOLD THE PT THAT THE OFFICE NEEDS TO SEND \"NUMBERS\" TO THEM SO THAT HE CAN GET HIS HOSPITAL BED. PT WASN'T SURE WHAT THAT MEANT EXACTLY.   "

## 2020-08-05 NOTE — TELEPHONE ENCOUNTER
Called [facility] I think maybe they are re: to his weight/height asked them to send request/form/call back

## 2020-08-06 NOTE — PROGRESS NOTES
Subjective Discussed patient's issues with him by telephone after he did not present for appointment    REASON FOR CONSULTATION: Small cell lung cancer  Provide an opinion on any further workup or treatment                             REQUESTING PHYSICIAN: Braulio Sorto MD, Bayron Jones MD    RECORDS OBTAINED:  Records of the patients history including those obtained from the referring provider were reviewed and summarized in detail.    HISTORY OF PRESENT ILLNESS:  The patient is a 65 y.o. year old male who is here for an opinion about the above issue.    History of Present Illness   The patient is a 65 year-old male  with a history including rheumatoid arthritis in [id number].  He was reviewed April 15, 2020 by rheumatology recovering from a recurrent urinary tract and prostatic infection requiring multiple antibiotic therapies.  At this point he has began to have weakness and left upper extremity and undergoing assessment by neurology.  Visits with neurology had been hindered by the COVID-19 epidemic though he was seen May 11 by Dr. Kalia Doll with the patient's left arm weakness developing over a year to the point that his function had been severely compromised and now is developing bilateral leg weakness.  His history includes a previous posterior laminectomy for spinal stenosis 20 years previous at [facility] with numbness in the hands and feet subsequent to that though his recent symptoms were more severe.  Plans were made for radiologic tests including EMG, laboratory studies and MRI of the cervical and thoracic spine.  The patient was seen by neurosurgery May 14 having had these procedures done but having difficulty breathing and a portion of the plain films aborted.  He did have evidence of L5-S1 foraminal stenosis, central stenosis L4-5, L3-4 with central stenosis and lateral recess stenosis.  CT scan cervical spine looked normal and there was concern of possible ALS with the patient being sent  to Dr. Kris Price at [facility].     His symptoms worsen however now respiratorily as well as the patient becoming considerably immobile with difficulty ambulating.     A follow-up CT scan of the chest revealed nodular lesions extending from the medial aspect of the right apex to the right hilum with right hilar lymphadenopathy and consideration of likely lung cancer.  A CT-guided biopsy was attempted though there were problems obtaining this and the procedure was aborted.  The patient's case was discussed at thoracic conference and biopsy via mediastinoscopy was planned.At this point additional tests including MAG antibodies, ST PG antibodies, Purkinje cell/neuronal nuclear IgG and GM 1 antibodies were obtained with the GM 1 antibodies IgG/IgM quite elevated at 207.  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     The patient was to be seen in office August 6, 2020 and, unfortunately, has missed 2 appointments July 22 and now today August 6.  When he did not arrive I contacted him to discuss his circumstances in particular to make him aware that treatment for his small cell lung cancer would very likely improve his neurologic status.  In contacting him later in the morning he is fairly certain that he does not wish chemotherapy but was surprised about the possibility of improving his neuropathy.  As result he wishes to discuss this at a later appointment.  He is, however, deteriorating and would require hospice care now.  Past Medical History:   Diagnosis Date   • Arthritis     rheumatoid   • Asthmatic bronchitis    • Cancer (CMS/HCC)    • COPD (chronic obstructive pulmonary disease) (CMS/HCC)    • Difficulty walking    • GERD (gastroesophageal reflux disease)    • History of gastritis    • Hyperlipidemia    • Hypertension    • Lung cancer (CMS/HCC)     STAGE III   • Movement disorder    • Multifocal  motor neuropathy (CMS/HCC)    • Numbness and tingling of upper and lower extremities of both sides    • Recovering alcoholic (CMS/HCC) 07/22/2019    LAST DRINK 07-   • Sinusitis    • Sleep apnea     cpap        Past Surgical History:   Procedure Laterality Date   • ABDOMINAL SURGERY     • ABDOMINAL SURGERY     • CERVICAL FUSION N/A 2005   • CERVICAL LAMINECTOMY     • COLONOSCOPY  2014    polyps   • GASTRIC BYPASS     • HERNIA REPAIR     • MEDIASTINOSCOPY N/A 7/10/2020    Procedure: MEDIASTINOSCOPY WITH BIOPSY;  Surgeon: Bayron Jones III, MD;  Location: [facility] MAIN OR;  Service: Thoracic;  Laterality: N/A;   • ROTATOR CUFF REPAIR Right 1985        Current Outpatient Medications on File Prior to Visit   Medication Sig Dispense Refill   • Acetaminophen (TYLENOL EXTRA STRENGTH PO) Take  by mouth 3 (Three) Times a Day As Needed.     • acetaminophen (TYLENOL) 500 MG tablet Take 1,000 mg by mouth Every 6 (Six) Hours As Needed for Mild Pain .     • flunisolide (NASALIDE) 25 MCG/ACT (0.025%) solution nasal spray 1 spray Every 12 (Twelve) Hours.  10   • folic acid (FOLVITE) 1 MG tablet Take 1 mg by mouth Daily.     • furosemide (LASIX) 20 MG tablet TAKE 1 TABLET BY MOUTH EVERY DAY (Patient taking differently: Take 20 mg by mouth Every Other Day.) 90 tablet 0   • guaiFENesin (Mucinex) 600 MG 12 hr tablet Take 2 tablets by mouth 2 (Two) Times a Day. 60 tablet 1   • ipratropium (ATROVENT) 0.02 % nebulizer solution Take 500 mcg by nebulization 4 (Four) Times a Day.     • LORazepam (Ativan) 0.5 MG tablet 1 pill po 1 hour prior to procedure 5 tablet 0   • methotrexate 2.5 MG tablet Take 17.5 mg by mouth 1 (One) Time Per Week. FRIDAY     • mometasone-formoterol (DULERA 200) 200-5 MCG/ACT inhaler Inhale 2 puffs 2 (Two) Times a Day.     • montelukast (SINGULAIR) 10 MG tablet Take 10 mg by mouth Every Night.     • NIFEdipine XL (PROCARDIA XL) 60 MG 24 hr tablet TAKE 1 TABLET BY MOUTH EVERY DAY (Patient taking differently:  Take 60 mg by mouth 2 (two) times a day.) 90 tablet 0   • VENTOLIN  (90 Base) MCG/ACT inhaler INHALE TWO PUFFS BY MOUTH EVERY 4 HOURS AS NEEDED FOR WHEEZING (Patient taking differently: Inhale 1 puff Every 4 (Four) Hours As Needed.) 1 inhaler 5     No current facility-administered medications on file prior to visit.         ALLERGIES:  No Known Allergies     Social History     Socioeconomic History   • Marital status:      Spouse name: Yin   • Number of children: Not on file   • Years of education: Not on file   • Highest education level: Not on file   Occupational History   • Occupation:      Employer: RETIRED   Tobacco Use   • Smoking status: Current Every Day Smoker     Packs/day: 1.00     Years: 50.00     Pack years: 50.00     Types: Cigarettes     Start date: 1968   • Smokeless tobacco: Never Used   • Tobacco comment: Started smoking at age 13.   Substance and Sexual Activity   • Alcohol use: Yes     Comment: RECOVERING ALCOHOLIC 07-   • Drug use: No   • Sexual activity: Defer        Family History   Problem Relation Age of Onset   • Asthma Mother    • Cancer Father         COLON   • Arthritis Father    • Hypertension Father    • Diabetes Father    • Heart disease Brother    • Cancer Other    • Arthritis Other    • Alcohol abuse Other    • Hypertension Maternal Grandmother    • Diabetes Maternal Grandmother    • Stroke Paternal Grandmother    • Hypertension Paternal Grandmother    • Diabetes Paternal Grandmother    • Heart disease Paternal Grandfather    • Malig Hyperthermia Neg Hx         Review of Systems   Constitutional: Positive for activity change, appetite change, fatigue and unexpected weight change.   HENT: Negative.    Eyes: Negative.    Respiratory: Positive for shortness of breath.    Cardiovascular: Negative.    Gastrointestinal: Negative.    Genitourinary: Positive for difficulty urinating.   Musculoskeletal: Positive for gait problem.   Skin: Negative.     Neurological: Positive for tremors, weakness and numbness.   Psychiatric/Behavioral: Negative.          Objective     There were no vitals filed for this visit.  No flowsheet data found.    Physical Exam      RECENT LABS:  Hematology WBC   Date Value Ref Range Status   07/08/2020 5.69 3.40 - 10.80 10*3/mm3 Final     RBC   Date Value Ref Range Status   07/08/2020 4.72 4.14 - 5.80 10*6/mm3 Final     Hemoglobin   Date Value Ref Range Status   07/08/2020 14.7 13.0 - 17.7 g/dL Final     Hematocrit   Date Value Ref Range Status   07/08/2020 42.6 37.5 - 51.0 % Final     Platelets   Date Value Ref Range Status   07/08/2020 164 140 - 450 10*3/mm3 Final          Assessment/Plan      65-year-old male with a history of rheumatoid arthritis on therapy, recurrent urinary tract infections and more recent development into the spring of progressive neurologic dysfunction initially involving his left arm and, shortly thereafter, bilateral lower extremities.  This is undergone studies for his progressive neurologic deterioration he has been reviewed by neurology and neurosurgery and there has been concern about ALS.  Recently were a follow-up CT scan of the chest revealed lesions in the medial aspect of the right hilum with right hilar lymphadenopathy and consideration of lung carcinoma.  CT-guided biopsy was aborted as result of his inability to position properly secondary to general discomfort and his case was discussed in conference leading to mediastinoscopy and the eventual diagnosis of high-grade small cell neuroendocrine carcinoma by mediastinoscopy July 10, 2020.     He has not been able to attend now to appointments and he is contacted by home to determine his status and informed him that he might actually improve neurologically if we treat his small cell lung cancer.  We have proceeded with the least a portion of paraneoplastic testing with additional tests including MAG antibodies, ST PG antibodies, Purkinje cell/neuronal  nuclear IgG and GM 1 antibodies were obtained with the GM 1 antibodies IgG/IgM quite elevated at 207.  After discussion with the patient by telephone plan:      *Hospice consultation- called about this and consultation requested    *Follow-up appointment in the next 3 to 4 weeks    You have chosen to receive care through a telephone visit today. Do you consent to use a telephone visit for your medical care today? Yes     This visit has been rescheduled as a phone visit to comply with patient safety concerns in accordance with CDC recommendations. Total time of discussion was 23 minutes.    00657 is 5-10 minutes  58211 is 11-20 minutes  43221 is 21 or more minutes

## 2020-08-06 NOTE — TELEPHONE ENCOUNTER
Called patient to see why he missed his appt, the wife answered and stated that Mr. Vazquez is too sick to come to the appt. I offered to have the appt rescheduled and she stated that he does not want to reschedule his appt and he is not interested in doing any kind of treatment.

## 2020-08-06 NOTE — PROGRESS NOTES
CSW spoke with Kaitlynn at [facility] and referred patient per Dr. Sandoval's request. CSW sent in-basket message to MA pool requesting orders, med, list, and most recent note be faxed to [phone].

## 2020-08-06 NOTE — TELEPHONE ENCOUNTER
CSW contacted patient to let him know a hospice referral has been made for him, and they will be contacting him within the next 24 hours.  Patient had a few questions about how hospice works, and CSW provided education/answers to his satisfaction.      CSW provided patient with contact information in case he should have additional questions or concerns, and remains available to assist as needed.

## 2020-08-06 NOTE — TELEPHONE ENCOUNTER
"Patient would like to speak to Dr. Sandoval about possibly writing him a prescription for \"anxiety, depression, and general aggravation\".     Callback# [phone]  "

## 2020-08-06 NOTE — TELEPHONE ENCOUNTER
"I called pt back , spoke with wife , she stated \"pt was sick and couldn't come to phone\"  Per message below , hospice is to contact pt in next 24 hrs.  Pt wife said patient would call us back.  "

## 2020-08-07 NOTE — TELEPHONE ENCOUNTER
----- Message from Christopher Sandoval MD sent at 8/6/2020  5:50 PM EDT -----  Yes, MIHAELA  ----- Message -----  From: Priyanka Renae RN  Sent: 8/6/2020   3:30 PM EDT  To: Christopher Sandoval MD    Hospice wants to know if you want to be the attending.

## 2020-08-18 PROBLEM — N39.0 RECURRENT UTI: Status: ACTIVE | Noted: 2020-01-01

## 2020-08-18 PROBLEM — R33.8 ACUTE URINARY RETENTION: Status: ACTIVE | Noted: 2020-01-01

## 2020-08-18 PROBLEM — J44.9 COPD (CHRONIC OBSTRUCTIVE PULMONARY DISEASE) (HCC): Status: ACTIVE | Noted: 2020-01-01

## 2020-08-18 PROBLEM — E87.1 HYPONATREMIA: Status: ACTIVE | Noted: 2020-01-01

## 2020-08-18 PROBLEM — Z66 DNR (DO NOT RESUSCITATE): Status: ACTIVE | Noted: 2020-01-01

## 2020-08-18 PROBLEM — Z74.09 IMMOBILITY: Status: ACTIVE | Noted: 2020-01-01

## 2020-08-18 PROBLEM — R79.89 ELEVATED TROPONIN: Status: ACTIVE | Noted: 2020-01-01

## 2020-08-18 PROBLEM — F10.21 RECOVERING ALCOHOLIC (HCC): Status: ACTIVE | Noted: 2019-07-22

## 2020-08-18 PROBLEM — G12.21 ALS (AMYOTROPHIC LATERAL SCLEROSIS) (HCC): Status: ACTIVE | Noted: 2020-01-01

## 2020-08-18 PROBLEM — C7A.8 NEUROENDOCRINE CARCINOMA OF LUNG (HCC): Status: ACTIVE | Noted: 2020-01-01

## 2020-08-18 PROBLEM — R77.8 ELEVATED TROPONIN: Status: ACTIVE | Noted: 2020-01-01

## 2020-08-18 PROBLEM — M06.9 RHEUMATOID ARTHRITIS INVOLVING MULTIPLE SITES (HCC): Status: ACTIVE | Noted: 2020-01-01

## 2020-08-18 NOTE — CONSULTS
"Adult Nutrition  Assessment/PES    Patient Name:  Ryley Vazquez Jr.  YOB: 1955  MRN: [id number]  Admit Date:  8/17/2020    Assessment Date:  8/18/2020    Comments:  Nurse admission screen consult. Pt is hopsice status at home. Admitted for urine retention. Indicates poor intake/appetite, and difficulty chewing. Adjusted diet to [facility] soft/ground meats. Will monitor intake & honor prefs.    Reason for Assessment     Row Name 08/18/20 1013          Reason for Assessment    Reason For Assessment  nurse/nurse practitioner consult     Diagnosis  cancer diagnosis/related complications;neurologic conditions;cardiac disease urine retention; hx lung cancer, ALS, ^troponin     Identified At Risk by Screening Criteria  MST SCORE 2+         Nutrition/Diet History     Row Name 08/18/20 1019          Nutrition/Diet History    Typical Food/Fluid Intake  Reportedly hasn't eaten or drank much over past few days; no BM in days. Is hospice status at home. Still smokes     Factors Affecting Nutritional Intake  anorexia;chewing difficulties/inability to chew food         Anthropometrics     Row Name 08/18/20 1029 08/18/20 0613       Anthropometrics    Height  170.2 cm (67\")  --    Weight  --  101 kg (222 lb 6.4 oz)       Admit Weight    Admit Weight  101 kg (222 lb)  --       Ideal Body Weight (IBW)    Ideal Body Weight (IBW) (kg)  68.1  --       Usual Body Weight (UBW)    Usual Body Weight  123 kg (272 lb)  --    % of Usual Body Weight Assessment  75-84% - moderate deficit 81  --    Weight Loss  unintentional  --        Labs/Tests/Procedures/Meds     Row Name 08/18/20 1031          Labs/Procedures/Meds    Lab Results Reviewed  reviewed        Diagnostic Tests/Procedures    Diagnostic Test/Procedure Reviewed  reviewed        Medications    Pertinent Medications Reviewed  reviewed         Physical Findings     Row Name 08/18/20 1031          Physical Findings    Overall Physical Appearance  edematous     " "    Estimated/Assessed Needs     Row Name 08/18/20 1029          Calculation Measurements    Height  170.2 cm (67\")         Nutrition Prescription Ordered     Row Name 08/18/20 1033          Nutrition Prescription PO    Current PO Diet  Regular         Evaluation of Received Nutrient/Fluid Intake     Row Name 08/18/20 1035          PO Evaluation    % PO Intake  very little intake x 2-3 days               Problem/Interventions:  Problem 1     Row Name 08/18/20 1036          Nutrition Diagnoses Problem 1    Problem 1  Inadequate Intake/Infusion     Inadequate Intake Type  Oral     Etiology (related to)  Medical Diagnosis     Oncology  Lung cancer     Signs/Symptoms (evidenced by)  Report of Mnimal PO Intake               Intervention Goal     Row Name 08/18/20 1050          Intervention Goal    General  Hospice Care;Reduce/improve symptoms     PO  Tolerate PO         Nutrition Intervention     Row Name 08/18/20 1050          Nutrition Intervention    RD/Tech Action  Recommend/ordered;Follow Tx progress     Recommended/Ordered  Diet         Nutrition Prescription     Row Name 08/18/20 1050          Nutrition Prescription PO    PO Prescription  Begin/change diet     Begin/Change Diet to  Soft Texture     Texture  Ground         Education/Evaluation     Row Name 08/18/20 1056          Education    Education  Will Instruct as appropriate        Monitor/Evaluation    Monitor  Per protocol           Electronically signed by:  Vivi Ferrara RD  08/18/20 10:57  "

## 2020-08-18 NOTE — ED NOTES
Nursing report ED to floor  Ryley Vazquez Jr.  65 y.o.  male    HPI (triage note):   Chief Complaint   Patient presents with   • Shortness of Breath       Admitting doctor:   Dm Calle MD    Admitting diagnosis:   The primary encounter diagnosis was Acute urinary retention. Diagnoses of Elevated troponin and Neuroendocrine carcinoma of lung (CMS/HCC) were also pertinent to this visit.    Code status:   Current Code Status     Date Active Code Status Order ID Comments User Context       8/18/2020 0026 CPR [id number]  Nancy Norman APRN ED       Questions for Current Code Status     Question Answer Comment    Code Status CPR     Medical Interventions (Level of Support Prior to Arrest) Full           Allergies:   Patient has no known allergies.    Weight:   There were no vitals filed for this visit.    Most recent vitals:   Vitals:    08/17/20 2300 08/17/20 2340 08/18/20 0000 08/18/20 0037   BP: 114/74 100/73 101/63 96/66   BP Location:  Left arm     Patient Position:  Lying     Pulse: 86 90 72 82   Resp:       Temp:       TempSrc:       SpO2: 95% 98% 97% 96%   Height:           Active LDAs/IV Access:   Lines, Drains & Airways    Active LDAs     Name:   Placement date:   Placement time:   Site:   Days:    Peripheral IV 08/17/20 2212 Right;Lower Forearm   08/17/20 2212    Forearm   less than 1    Urethral Catheter Silicone 16 Fr.   08/17/20 2347     less than 1                Labs (abnormal labs have a star):   Labs Reviewed   COMPREHENSIVE METABOLIC PANEL - Abnormal; Notable for the following components:       Result Value    Glucose 104 (*)     BUN 31 (*)     Sodium 128 (*)     Chloride 87 (*)     ALT (SGPT) 44 (*)     AST (SGOT) 67 (*)     Alkaline Phosphatase 119 (*)     Total Bilirubin 2.3 (*)     BUN/Creatinine Ratio 33.0 (*)     All other components within normal limits    Narrative:     GFR Normal >60  Chronic Kidney Disease <60  Kidney Failure <15     TROPONIN (IN-HOUSE) - Abnormal;  "Notable for the following components:    Troponin T 0.073 (*)     All other components within normal limits    Narrative:     Troponin T Reference Range:  <= 0.03 ng/mL-   Negative for AMI  >0.03 ng/mL-     Abnormal for myocardial necrosis.  Clinicians would have to utilize clinical acumen, EKG, Troponin and serial changes to determine if it is an Acute Myocardial Infarction or myocardial injury due to an underlying chronic condition.       Results may be falsely decreased if patient taking Biotin.     PROCALCITONIN - Abnormal; Notable for the following components:    Procalcitonin 3.39 (*)     All other components within normal limits    Narrative:     As a Marker for Sepsis (Non-Neonates):   1. <0.5 ng/mL represents a low risk of severe sepsis and/or septic shock.  1. >2 ng/mL represents a high risk of severe sepsis and/or septic shock.    As a Marker for Lower Respiratory Tract Infections that require antibiotic therapy:  PCT on Admission     Antibiotic Therapy             6-12 Hrs later  > 0.5                Strongly Recommended            >0.25 - <0.5         Recommended  0.1 - 0.25           Discouraged                   Remeasure/reassess PCT  <0.1                 Strongly Discouraged          Remeasure/reassess PCT      As 28 day mortality risk marker: \"Change in Procalcitonin Result\" (> 80 % or <=80 %) if Day 0 (or Day 1) and Day 4 values are available. Refer to http://www.[employer]-pct-calculator.com/   Change in PCT <=80 %   A decrease of PCT levels below or equal to 80 % defines a positive change in PCT test result representing a higher risk for 28-day all-cause mortality of patients diagnosed with severe sepsis or septic shock.  Change in PCT > 80 %   A decrease of PCT levels of more than 80 % defines a negative change in PCT result representing a lower risk for 28-day all-cause mortality of patients diagnosed with severe sepsis or septic shock.                Results may be falsely decreased if patient " taking Biotin.    URINALYSIS W/ CULTURE IF INDICATED - Abnormal; Notable for the following components:    Color, UA Dark Yellow (*)     Leuk Esterase, UA Trace (*)     All other components within normal limits   CBC WITH AUTO DIFFERENTIAL - Abnormal; Notable for the following components:    Platelets 129 (*)     Neutrophil % 83.0 (*)     Lymphocyte % 7.9 (*)     Eosinophil % 0.2 (*)     Lymphocytes, Absolute 0.66 (*)     All other components within normal limits   BNP (IN-HOUSE) - Normal    Narrative:     Among patients with dyspnea, NT-proBNP is highly sensitive for the detection of acute congestive heart failure. In addition NT-proBNP of <300 pg/ml effectively rules out acute congestive heart failure with 99% negative predictive value.    Results may be falsely decreased if patient taking Biotin.     LACTIC ACID, PLASMA - Normal   CK - Normal   MAGNESIUM - Normal   COVID PRE-OP / PRE-PROCEDURE SCREENING ORDER (NO ISOLATION)    Narrative:     The following orders were created for panel order COVID PRE-OP / PRE-PROCEDURE SCREENING ORDER (NO ISOLATION) - Swab, Nasopharynx.  Procedure                               Abnormality         Status                     ---------                               -----------         ------                     COVID-19,BIOTAP, NP/OP S...[[id number]]                      In process                   Please view results for these tests on the individual orders.   COVID-19,BIOTAP, NP/OP SWAB IN TRANSPORT MEDIA OR SALINE 24-36 HR TAT   URINALYSIS, MICROSCOPIC ONLY   BASIC METABOLIC PANEL   CBC WITH AUTO DIFFERENTIAL   OSMOLALITY, URINE   SODIUM, URINE, RANDOM   CHLORIDE, URINE, RANDOM   TROPONIN (IN-HOUSE)   TROPONIN (IN-HOUSE)   CBC AND DIFFERENTIAL    Narrative:     The following orders were created for panel order CBC & Differential.  Procedure                               Abnormality         Status                     ---------                               -----------          ------                     CBC Auto Differential[[id number]]        Abnormal            Final result                 Please view results for these tests on the individual orders.       EKG:   ECG 12 Lead   Preliminary Result   HEART RATE= 100  bpm   RR Interval= 656  ms   CO Interval= 159  ms   P Horizontal Axis= 23  deg   P Front Axis= 107  deg   QRSD Interval= 92  ms   QT Interval= 338  ms   QRS Axis= 24  deg   T Wave Axis= 47  deg   - ABNORMAL ECG -   Sinus rhythm   Multiple premature complexes, vent & supraven   Low voltage, extremity leads   Minimal ST elevation, inferior leads   Electronically Signed By:    Date and Time of Study: 2020-08-17 22:23:28          Meds given in ED:   Medications   sodium chloride 0.9 % flush 10 mL (10 mL Intravenous Given 8/17/20 2251)   sodium chloride 0.9 % flush 10 mL (has no administration in time range)   sodium chloride 0.9 % flush 10 mL (has no administration in time range)   acetaminophen (TYLENOL) tablet 650 mg (has no administration in time range)     Or   acetaminophen (TYLENOL) 160 MG/5ML solution 650 mg (has no administration in time range)     Or   acetaminophen (TYLENOL) suppository 650 mg (has no administration in time range)   bisacodyl (DULCOLAX) EC tablet 5 mg (has no administration in time range)   ondansetron (ZOFRAN) tablet 4 mg (has no administration in time range)     Or   ondansetron (ZOFRAN) injection 4 mg (has no administration in time range)   calcium carbonate (TUMS) chewable tablet 500 mg (200 mg elemental) (has no administration in time range)   HYDROcodone-acetaminophen (NORCO) 7.5-325 MG per tablet 1 tablet (1 tablet Oral Given 8/17/20 2250)   ondansetron (ZOFRAN) injection 4 mg (4 mg Intravenous Given 8/17/20 2250)   furosemide (LASIX) injection 40 mg (40 mg Intravenous Given 8/18/20 0025)       Imaging results:  Xr Chest 1 View    Result Date: 8/17/2020  Mild increased density of the right lung base may represent mild atelectasis or developing  pneumonia. 2. Increased soft tissue in the right suprahilar region may represent the known soft tissue mass seen on the CT scan of 5/29/2020. 3. Please see dictation for the CT of the chest from 5/29/2020.  This report was finalized on 8/17/2020 11:04 PM by Dr. Jordy Little M.D.        Ambulatory status:   - bedrest    Social issues:   Social History     Socioeconomic History   • Marital status:      Spouse name: Yin   • Number of children: Not on file   • Years of education: Not on file   • Highest education level: Not on file   Occupational History   • Occupation:      Employer: RETIRED   Tobacco Use   • Smoking status: Current Every Day Smoker     Packs/day: 1.00     Years: 50.00     Pack years: 50.00     Types: Cigarettes     Start date: 1968   • Smokeless tobacco: Never Used   • Tobacco comment: Started smoking at age 13.   Substance and Sexual Activity   • Alcohol use: Yes     Comment: RECOVERING ALCOHOLIC 07-   • Drug use: No   • Sexual activity: Flip Miller RN  08/18/20 0123

## 2020-08-18 NOTE — SIGNIFICANT NOTE
08/18/20 1416   Rehab Time/Intention   Evaluation Not Performed other (see comments)  (Met with pt and wife at bedside. Pt dependent for all ADL's and mobility. No skilled inpatient OT services indiated.)   Rehab Treatment   Discipline occupational therapist

## 2020-08-18 NOTE — SIGNIFICANT NOTE
08/18/20 1337   Rehab Time/Intention   Evaluation Not Performed other (see comments)  (not appropriate for skilled PT at this time. Discussed with family and RN. Pt has total care at home, his wife is able to assist. They are not interested in therapy.  Will sign off.)   Rehab Treatment   Discipline physical therapist

## 2020-08-18 NOTE — CONSULTS
"[facility]  Travis Schwab MD    Patient Care Team:  Braulio Sorto MD as PCP - General  Braulio Sorto MD as PCP - Claims Attributed  Erik Mclaughlin MD as Consulting Physician (Allergy and Immunology)  Bayron Jones III, MD as Referring Physician (Thoracic Surgery)  Christopher Sandoval MD as Consulting Physician (Hematology and Oncology)    Chief complaint: Urinary retention    Subjective .     History of present illness: This 65-year-old male was initially seen in November 2019 for an elevated PSA of 5.73.  Subsequently his PSA decreased to 2.17.  At that time he was voiding fine and had very little residual of about 1 ounce.  He was placed on Flomax.  He was last seen on 7/27/2020.  At that time he was taking Flomax 0.8 mg nightly.  He relates that his stream was pretty good but he had a little bit urgency to void.  No pushing or straining.  PSA was 1.33.  He had Hesham been diagnosed with \"stage III\" lung cancer and evidently had a elected no treatment.    He relates that he quit urinating over the last few days.  He says this was a progressive problem.  His urine became very dark.  He relates that once he came into the emergency room, a bladder scan was performed and did not show any residual.  However when a catheter was finally placed, it drained 3-1/2 \"urinals\" immediately.  He was having no pain or discomfort prior to catheterization.    He relates that his bowels would not empty well because \"nothing in-nothing out\".  His appetite had decreased and he was not eating much.    Review of Systems  All systems were reviewed and were negative except for decreased ambulatory ability.  Anasarca development with extreme swelling of his lower extremities and feet.  Progressive difficulty voiding with minimal urine output recently.  Dark urine noted.  No burning.  No gross hematuria that he knew.    History  Past Medical History:   Diagnosis Date   • Arthritis     rheumatoid   • Asthmatic bronchitis    • " Cancer (CMS/HCC)    • COPD (chronic obstructive pulmonary disease) (CMS/HCC)    • Difficulty walking    • GERD (gastroesophageal reflux disease)    • History of gastritis    • Hyperlipidemia    • Hypertension    • Lung cancer (CMS/HCC)     STAGE III   • Movement disorder    • 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HCC)    • Numbness and tingling of upper and lower extremities of both sides    • Recovering alcoholic (CMS/HCC) 07/22/2019    LAST DRINK 07-   • Sinusitis    • Sleep apnea     cpap   , Past Surgical History:   Procedure Laterality Date   • ABDOMINAL SURGERY     • ABDOMINAL SURGERY     • CERVICAL FUSION N/A 2005   • CERVICAL LAMINECTOMY     • COLONOSCOPY  2014    polyps   • GASTRIC BYPASS     • HERNIA REPAIR     • MEDIASTINOSCOPY N/A 7/10/2020    Procedure: MEDIASTINOSCOPY WITH BIOPSY;  Surgeon: Bayron Jones III, MD;  Location: [facility];  Service: Thoracic;  Laterality: N/A;   • ROTATOR CUFF REPAIR Right 1985   , Family History   Problem Relation Age of Onset   • Asthma Mother    • Cancer Father         COLON   • Arthritis Father    • Hypertension Father    • Diabetes Father    • Heart disease Brother    • Cancer Other    • Arthritis Other    • Alcohol abuse Other    • Hypertension Maternal Grandmother    • Diabetes Maternal Grandmother    • Stroke Paternal Grandmother    • Hypertension Paternal Grandmother    • Diabetes Paternal Grandmother    • Heart disease Paternal Grandfather    • Malig Hyperthermia Neg Hx    , Social History     Tobacco Use   • Smoking status: Current Every Day Smoker     Packs/day: 1.00     Years: 50.00     Pack years: 50.00     Types: Cigarettes     Start date: 1968   • Smokeless tobacco: Never Used   • Tobacco comment: Started smoking at age 13.   Substance Use Topics   • Alcohol use: Yes     Comment: RECOVERING ALCOHOLIC 07-   • Drug use: No   , Medications Prior to Admission   Medication Sig Dispense Refill Last Dose   • Acetaminophen (TYLENOL EXTRA STRENGTH  PO) Take  by mouth 3 (Three) Times a Day As Needed.   8/17/2020 at 1800   • acetaminophen (TYLENOL) 500 MG tablet Take 1,000 mg by mouth Every 6 (Six) Hours As Needed for Mild Pain .   8/17/2020 at 1800   • flunisolide (NASALIDE) 25 MCG/ACT (0.025%) solution nasal spray 1 spray Every 12 (Twelve) Hours.  10 8/17/2020 at Unknown time   • folic acid (FOLVITE) 1 MG tablet Take 1 mg by mouth Daily.   8/17/2020 at Unknown time   • furosemide (LASIX) 20 MG tablet TAKE 1 TABLET BY MOUTH EVERY DAY (Patient taking differently: Take 20 mg by mouth Every Other Day.) 90 tablet 0 Past Week at Unknown time   • guaiFENesin (Mucinex) 600 MG 12 hr tablet Take 2 tablets by mouth 2 (Two) Times a Day. 60 tablet 1 Past Month at Unknown time   • ipratropium (ATROVENT) 0.02 % nebulizer solution Take 500 mcg by nebulization 4 (Four) Times a Day.   8/17/2020 at Unknown time   • LORazepam (Ativan) 0.5 MG tablet 1 pill po 1 hour prior to procedure 5 tablet 0 Past Week at Unknown time   • mometasone-formoterol (DULERA 200) 200-5 MCG/ACT inhaler Inhale 2 puffs 2 (Two) Times a Day.   8/17/2020 at Unknown time   • montelukast (SINGULAIR) 10 MG tablet Take 10 mg by mouth Every Night.   8/17/2020 at Unknown time   • NIFEdipine XL (PROCARDIA XL) 60 MG 24 hr tablet TAKE 1 TABLET BY MOUTH EVERY DAY (Patient taking differently: Take 60 mg by mouth 2 (two) times a day.) 90 tablet 0 8/17/2020 at Unknown time   • VENTOLIN  (90 Base) MCG/ACT inhaler INHALE TWO PUFFS BY MOUTH EVERY 4 HOURS AS NEEDED FOR WHEEZING (Patient taking differently: Inhale 1 puff Every 4 (Four) Hours As Needed.) 1 inhaler 5 8/17/2020 at Unknown time   , Scheduled Meds:    budesonide-formoterol 2 puff Inhalation BID - RT   enoxaparin 40 mg Subcutaneous Q24H   fluticasone 1 spray Each Nare Daily   folic acid 1 mg Oral Daily   furosemide 40 mg Intravenous Q12H   guaiFENesin 1,200 mg Oral BID   ipratropium-albuterol 3 mL Nebulization 4x Daily - RT   montelukast 10 mg Oral Nightly    , Continuous Infusions:    Pharmacy to Dose enoxaparin (LOVENOX)    , PRN Meds:  •  acetaminophen **OR** acetaminophen **OR** acetaminophen  •  bisacodyl  •  calcium carbonate  •  HYDROcodone-acetaminophen  •  ipratropium-albuterol  •  Morphine  •  ondansetron **OR** ondansetron  •  Pharmacy to Dose enoxaparin (LOVENOX)  •  [COMPLETED] Insert peripheral IV **AND** sodium chloride, Allergies:  Patient has no known allergies. and     Objective     Vital Signs   Temp:  [96.6 °F (35.9 °C)-97.1 °F (36.2 °C)] 97.1 °F (36.2 °C)  Heart Rate:  [58-93] 80  Resp:  [18-22] 18  BP: ()/(63-89) 108/71    Physical Exam:     General Appearance:    Alert, cooperative, in no acute distress   Head:    Normocephalic, without obvious abnormality, atraumatic   Eyes:            Lids and lashes normal, conjunctivae and sclerae normal, no   icterus, no pallor, corneas clear, PERRLA   Ears:    Ears appear intact with no abnormalities noted   Throat:   No oral lesions, no thrush, oral mucosa moist   Neck:   No adenopathy, supple, trachea midline,    Back:     No kyphosis present, no scoliosis present, no skin lesions,      erythema or scars, no tenderness to percussion or                   palpation,   range of motion normal   Lungs:     Clear to auscultation,respirations regular, even and                  unlabored    Heart:    Regular rhythm and normal rate, normal S1 and S2, no            murmur, no gallop, no rub, no click   Chest Wall:    No abnormalities observed   Abdomen:     Normal bowel sounds, no masses, no organomegaly, soft        non-tender, non-distended, no guarding, no rebound                tenderness   Genital/Rectal:    Penis and testicles normal.  Catheter in place.  Urine output clear.   Extremities:   Moves all extremities well, bilateral significant edema, no cyanosis, no             redness       Skin:   No bleeding, bruising or rash       Neurologic:   Cranial nerves 2 - 12 grossly intact, sensation intact,        Results Review:   I reviewed the patient's new clinical results.  Discussed with The patient and his wife.  His creatinine has remained normal.      Assessment/Plan       Acute urinary retention    CARLITOS (obstructive sleep apnea)    Hypertension    Neuroendocrine carcinoma of lung (CMS/HCC)    ALS (amyotrophic lateral sclerosis) (CMS/HCC)    COPD (chronic obstructive pulmonary disease) (CMS/HCC)    Recovering alcoholic (CMS/HCC)    Recurrent UTI    Immobility    Rheumatoid arthritis involving multiple sites (CMS/HCC)    Elevated troponin    DNR (do not resuscitate)    Hyponatremia      Probable flaccid neurogenic bladder-based on no discomfort or sensation of severe bladder distention.    I discussed the patients findings and my recommendations with patient, family and At this point I certainly would not remove his catheter.  I will see him tomorrow.  He would have to improve significantly to consider a voiding trial.  Thank you this consultation I will continue to follow.    Travis Schwab MD  08/18/20  18:10    Time: 40 minutes reviewing x-rays, history and physical, reviewing records, discussion of plans.

## 2020-08-18 NOTE — THERAPY EVALUATION
Acute Care - Speech Language Pathology   Swallow Initial Evaluation [facility]     Patient Name: Ryley Vazquez Jr.  : 1955  MRN: [id number]  Today's Date: 2020               Admit Date: 2020    Visit Dx:     ICD-10-CM ICD-9-CM   1. Acute urinary retention R33.8 788.29   2. Elevated troponin R79.89 790.6   3. Neuroendocrine carcinoma of lung (CMS/HCC) C7A.8 209.21     Patient Active Problem List   Diagnosis   • CARLITOS (obstructive sleep apnea)   • Sinusitis   • Hypertension   • Asthmatic bronchitis   • Arthritis   • Osteoarthritis of multiple joints   • Class 3 severe obesity with body mass index (BMI) of 40.0 to 44.9 in adult (CMS/HCC)   • Cervical spondylosis with myelopathy   • Neuroendocrine carcinoma of lung (CMS/HCC)   • Acute urinary retention   • ALS (amyotrophic lateral sclerosis) (CMS/HCC)   • COPD (chronic obstructive pulmonary disease) (CMS/HCC)   • Recovering alcoholic (CMS/HCC)   • Recurrent UTI   • Immobility   • Rheumatoid arthritis involving multiple sites (CMS/HCC)   • Elevated troponin   • DNR (do not resuscitate)   • Hyponatremia     Past Medical History:   Diagnosis Date   • Arthritis     rheumatoid   • Asthmatic bronchitis    • Cancer (CMS/HCC)    • COPD (chronic obstructive pulmonary disease) (CMS/HCC)    • Difficulty walking    • GERD (gastroesophageal reflux disease)    • History of gastritis    • Hyperlipidemia    • Hypertension    • Lung cancer (CMS/HCC)     STAGE III   • Movement disorder    • 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HCC)    • Numbness and tingling of upper and lower extremities of both sides    • Recovering alcoholic (CMS/HCC) 2019    LAST DRINK 2019   • Sinusitis    • Sleep apnea     cpap     Past Surgical History:   Procedure Laterality Date   • ABDOMINAL SURGERY     • ABDOMINAL SURGERY     • CERVICAL FUSION N/A    • CERVICAL LAMINECTOMY     • COLONOSCOPY      polyps   • GASTRIC BYPASS     • HERNIA REPAIR     • MEDIASTINOSCOPY N/A  "7/10/2020    Procedure: MEDIASTINOSCOPY WITH BIOPSY;  Surgeon: Bayron Jones III, MD;  Location: [facility]IN OR;  Service: Thoracic;  Laterality: N/A;   • ROTATOR CUFF REPAIR Right 1985        SWALLOW EVALUATION (last 72 hours)      SLP Adult Swallow Evaluation     Row Name 08/18/20 1000                   Rehab Evaluation    Document Type  evaluation  -SH        Patient Effort  excellent  -SH           General Information    Patient Profile Reviewed  yes  -SH        Pertinent History Of Current Problem  Urinary retention, probable ALS, lung CA  -SH        Current Method of Nutrition  regular textures;thin liquids  -        Precautions/Limitations, Vision  WFL;for purposes of eval  -SH        Precautions/Limitations, Hearing  WFL;for purposes of eval  -SH        Prior Level of Function-Communication  WFL  -        Prior Level of Function-Swallowing  other (see comments) trouble with mastication  -        Plans/Goals Discussed with  patient;spouse/S.O.;agreed upon;other (see comments) wife via phone  -        Barriers to Rehab  previous functional deficit hospice  -        Patient's Goals for Discharge  patient did not state  -        Family Goals for Discharge  family did not state  -           Oral Motor and Function    Dentition Assessment  missing teeth  -SH        Volitional Swallow  unable to elicit  -        Volitional Cough  weak  -           Oral Musculature and Cranial Nerve Assessment    Oral Motor General Assessment  generalized oral motor weakness  -SH           Clinical Swallow Eval    Clinical Swallow Evaluation Summary  Patient seen for clinical swallow assessment. Pt with known lung cancer and probable ALS per chart, unable to feed himself. Despite hospice status, patient was agreeable to swallow assessment. Wife indicated the patient has no difficulty with liquids, but will masticate meat and \"spit it out\". Pt reports difficulty with mastication d/t missing dentition. Patient " was on regular and thins during assessment. Baseline cough. Laryngeal elevation appeared appropriate. No overt s/s of aspiration with ice or thins via spoon. Belching and suspected backflow observed with thins and nectar via straw with delayed cough. Wet voice also appreciated with thins. Belching persisted with honey via straw, but no coughing or voice change appreciated. Adequate AP transit with puree. Rotary and munching mastication with mech soft and regular. No oral residue post swallow. SLP recs regular, ground meats, and honey. Pt refused diet modifications until results from VFSS. Of note, RD change patient's diet to mech soft, ground meats while SLP was evaluating the patient. Will follow for VFSS next date per patient request.  -           Clinical Impression    SLP Swallowing Diagnosis  suspected pharyngeal dysfunction  -        Functional Impact  risk of aspiration/pneumonia  -        Rehab Potential/Prognosis, Swallowing  good, to achieve stated therapy goals  -        Swallow Criteria for Skilled Therapeutic Interventions Met  demonstrates skilled criteria  -           Recommendations    Therapy Frequency (Swallow)  PRN  -        Predicted Duration Therapy Intervention (Days)  until discharge  -        SLP Diet Recommendation  honey thick liquids;regular textures;other (see comments) Ground meats, pt refused until VFSS  -        Recommended Diagnostics  reassess via [place]  -        Recommended Precautions and Strategies  upright posture during/after eating;volitional throat clear  -        SLP Rec. for Method of Medication Administration  meds whole;as tolerated  -        Monitor for Signs of Aspiration  yes;notify SLP if any concerns  -        Anticipated Dischage Disposition (SLP)  other (see comments) HOME WITH HOSPICE  -           Swallow Goals (SLP)    Oral Nutrition/Hydration Goal Selection (SLP)  oral nutrition/hydration, SLP goal 1  -           Oral  "Nutrition/Hydration Goal 1 (SLP)    Oral Nutrition/Hydration Goal 1, SLP  Pt will alec PO without overt s/s of asp.  -        Time Frame (Oral Nutrition/Hydration Goal 1, SLP)  by discharge  -          User Key  (r) = Recorded By, (t) = Taken By, (c) = Cosigned By    Initials Name Effective Dates     Shannon Arenas, MS CCC-SLP 03/07/18 -           EDUCATION  The patient has been educated in the following areas:   Dysphagia (Swallowing Impairment).    SLP Recommendation and Plan  SLP Swallowing Diagnosis: suspected pharyngeal dysfunction  SLP Diet Recommendation: honey thick liquids, regular textures, other (see comments)(Ground meats, pt refused until VFSS)  Recommended Precautions and Strategies: upright posture during/after eating, volitional throat clear  SLP Rec. for Method of Medication Administration: meds whole, as tolerated     Monitor for Signs of Aspiration: yes, notify SLP if any concerns  Recommended Diagnostics: reassess via VFSS ([facility])  Swallow Criteria for Skilled Therapeutic Interventions Met: demonstrates skilled criteria  Anticipated Dischage Disposition (SLP): other (see comments)(HOME WITH HOSPICE)  Rehab Potential/Prognosis, Swallowing: good, to achieve stated therapy goals  Therapy Frequency (Swallow): PRN  Predicted Duration Therapy Intervention (Days): until discharge       Plan of Care Reviewed With: patient  Progress: declining  Outcome Summary: Patient seen for clinical swallow assessment. Pt with known lung cancer and probable ALS per chart, unable to feed himself. Despite hospice status, patient was agreeable to swallow assessment. Wife indicated the patient has no difficulty with liquids, but will masticate meat and \"spit it out\". Pt reports difficulty with mastication d/t missing dentition. Patient was on regular and thins during assessment. Laryngeal elevation appeared appropriate. No overt s/s of aspiration with ice or thins via spoon. Belching and suspected backflow observed with " thins and nectar via straw with delayed cough. Wet voice also appreciated with thins. Belching persisted with honey via straw, but no coughing or voice change appreciated. Adequate AP transit with puree. Rotary and munching mastication with mech soft and regular. No oral residue post swallow. SLP recs regular, ground meats, and honey. Pt refused diet modifications until results from VFSS. Of note, RD change patient's diet to mech soft, ground meats while SLP was evaluating the patient. Will follow for VFSS next date per patient request.    SLP GOALS     Row Name 08/18/20 1000             Oral Nutrition/Hydration Goal 1 (SLP)    Oral Nutrition/Hydration Goal 1, SLP  Pt will alec PO without overt s/s of asp.  -      Time Frame (Oral Nutrition/Hydration Goal 1, SLP)  by discharge  -        User Key  (r) = Recorded By, (t) = Taken By, (c) = Cosigned By    Initials Name Provider Type     Shannon Arenas MS CCC-SLP Speech and Language Pathologist           SLP Outcome Measures (last 72 hours)      SLP Outcome Measures     Row Name 08/18/20 1100             SLP Outcome Measures    Outcome Measure Used?  Adult NOMS  -         Adult FCM Scores    FCM Chosen  Swallowing  -      Swallowing FCM Score  3  -        User Key  (r) = Recorded By, (t) = Taken By, (c) = Cosigned By    Initials Name Effective Dates     Shannon Arenas MS CCC-SLP 03/07/18 -            Time Calculation:   Time Calculation- SLP     Row Name 08/18/20 1126             Time Calculation- [facility]    SLP Start Time  1000  -      SLP Received On  08/18/20  -        User Key  (r) = Recorded By, (t) = Taken By, (c) = Cosigned By    Initials Name Provider Type     Shannon Arenas MS CCC-SLP Speech and Language Pathologist          Therapy Charges for Today     Code Description Service Date Service Provider Modifiers Qty    [id number]  ST EVAL ORAL PHARYNG SWALLOW 4 8/18/2020 Shannon Arenas MS CCC-SLP GN 1               Shannon Arenas MS  CCC-SLP  8/18/2020

## 2020-08-18 NOTE — ED TRIAGE NOTES
Pt was brought in by ems for soa that started today. Denies fever. Reports decreased urine output and has not been taking lasix. Pt has 4+ pitting edema    Pt placed in mask on arrival. Staff wearing mask and goggles at time of triage.

## 2020-08-18 NOTE — PROGRESS NOTES
[facility] Visit Report    Ryley Vazquez Jr.  [id number]  8/18/2020    Admission R/T [facility] Dx: Yes    Reason for [facility] Admission: Lung Cancer    Symptom  Management: Pain/Dyspnea    Nursing/Medication Recommendations: Schedule Morphine for dyspnea    Psychosocial Issues and Recommendations:    Spiritual Concerns and Recommendations:    [facility] Discharge Plans:  None.  Pt meets GIP criteria with goal of comfort.    Review of Visit :  Pt is a pps of 20% and is pleasantly alert and oriented x3 with complaints of dyspnea and has some new wet coughing present during visit.  Pt states that he feels very tired and just wants to be comfortable at this time with goal of potentially going home.  RN placed a call to Spouse to update her on his care and request to go home and she states that the pt is not going home and states that as EMS was loading him in Pt states goodbye to Spouse and Son.  I spent time going over condition decline and pt states that he understands and wants to be comfortable.  RN spoke with Melissa Rogel to determine that stay is related/covered and Spouse states that she would like for pt to remain at [facility] for comfort being main goal and pt is not eating at this time.  RN reported findings to Kat WISDOM regarding pt current condition.        Nino Andersen RN

## 2020-08-18 NOTE — ED NOTES
"Pt complains of increasing SOA for the past 2-3 days. Pt reports slight back pain only but knows this from lying on his back. (8/10) Pt reports he is unable to get up so he states \"Why take Lasix when I can't get up and go to the bathroom.\" I asked if he has a urinal at home. He will tell staff when they enter that he can't use his arms or legs. He told me I would have to help him with all urinary needs as he is immobile. Pt reports I have also had nasal congestion recently as well. Dr Quinteros placed pt on oxygen per [place] at 2L - 97%.     Lisa Serrano, RN  08/17/20 3132    "

## 2020-08-18 NOTE — PLAN OF CARE
"  Problem: Patient Care Overview  Goal: Plan of Care Review  Outcome: Ongoing (interventions implemented as appropriate)  Flowsheets (Taken 8/18/2020 7631)  Progress: improving  Plan of Care Reviewed With: patient  Note:   Oriented to unit, room, staff.  Sensitive call light used because of limited use of hands/arms.  Very calm and cooperative.  VSS, Pt has been bedridden \"for at least six months\", Depends on wife for everything.  Has hospice staff coming to house regularly. Pt has no use of arms/hands.  Limited movement of legs.  BLE edema that pt says has improved since lasix in ER and F/C placed.  Urine dark yellow.   Complains of back pain.  On call notified.       "

## 2020-08-18 NOTE — ED PROVIDER NOTES
" EMERGENCY DEPARTMENT ENCOUNTER    Room Number:  21/21  Date of encounter:  8/18/2020  PCP: Braulio Sorto MD  Historian: Patient      HPI:  Chief Complaint: Dyspnea, lower extremity edema  A complete HPI/ROS/PMH/PSH/SH/FH are unobtainable due to: N/A    Context: Ryley Vazquez Jr. is a 65 y.o. male who presents to the ED c/o increasing dyspnea and lower extremity edema.  He states his feet have been swelling more for the past several weeks, he has been more dyspneic for the past few days.  He is also noticed decreased urine output and that his urine looks like \"Dr. Pepper\".  Because of this, he stopped taking Lasix.  He has had a mild, nonproductive cough.  He has had no fevers or chills.  He has had no nausea or vomiting.  No diarrhea-he has not had a bowel movement in several days because he has not been eating.  His symptoms are worsened by any sort of exertion or coughing.  There are no alleviating factors.  No known exposure to COVID-19.    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  We discussed his advanced directive, and he declines any sort of heroic measures.  He specifically declined intubation, CPR, chest compressions and defibrillations.  He stated, \"I have two fatal diseases that are in a race to kill me\" and was fairly adamant about his DNR wishes.      The patient was placed in a mask in triage, hand hygiene was performed before and after my interaction with the patient.  I wore a mask, safety glasses and gloves during my entire interaction with the patient.    PAST MEDICAL HISTORY  Active Ambulatory Problems     Diagnosis Date Noted   • CARLITOS (obstructive sleep apnea) 12/05/2019   • Sinusitis    • Hypertension    • Asthmatic bronchitis    • Arthritis    • Osteoarthritis of multiple joints 07/08/2019   • Class 3 severe obesity with body mass index (BMI) of 40.0 to 44.9 in adult (CMS/[facility]) 12/05/2019   • Cervical spondylosis with myelopathy 05/12/2020   • " Small cell lung cancer, right upper lobe (CMS/[facility]) 06/11/2020     Resolved Ambulatory Problems     Diagnosis Date Noted   • No Resolved Ambulatory Problems     Past Medical History:   Diagnosis Date   • Cancer (CMS/[facility])    • COPD (chronic obstructive pulmonary disease) (CMS/[facility])    • Difficulty walking    • GERD (gastroesophageal reflux disease)    • History of gastritis    • Hyperlipidemia    • Lung cancer (CMS/[facility])    • Movement disorder    • 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[facility])    • Numbness and tingling of upper and lower extremities of both sides    • Recovering alcoholic (CMS/[facility]) 07/22/2019   • Sleep apnea          PAST SURGICAL HISTORY  Past Surgical History:   Procedure Laterality Date   • ABDOMINAL SURGERY     • ABDOMINAL SURGERY     • CERVICAL FUSION N/A 2005   • CERVICAL LAMINECTOMY     • COLONOSCOPY  2014    polyps   • GASTRIC BYPASS     • HERNIA REPAIR     • MEDIASTINOSCOPY N/A 7/10/2020    Procedure: MEDIASTINOSCOPY WITH BIOPSY;  Surgeon: Bayron Jones III, MD;  Location: [facility];  Service: Thoracic;  Laterality: N/A;   • ROTATOR CUFF REPAIR Right 1985         FAMILY HISTORY  Family History   Problem Relation Age of Onset   • Asthma Mother    • Cancer Father         COLON   • Arthritis Father    • Hypertension Father    • Diabetes Father    • Heart disease Brother    • Cancer Other    • Arthritis Other    • Alcohol abuse Other    • Hypertension Maternal Grandmother    • Diabetes Maternal Grandmother    • Stroke Paternal Grandmother    • Hypertension Paternal Grandmother    • Diabetes Paternal Grandmother    • Heart disease Paternal Grandfather    • Malig Hyperthermia Neg Hx          SOCIAL HISTORY  Social History     Socioeconomic History   • Marital status:      Spouse name: Yin   • Number of children: Not on file   • Years of education: Not on file   • Highest education level: Not on file   Occupational History   • Occupation:      Employer: RETIRED   Tobacco Use   •  Smoking status: Current Every Day Smoker     Packs/day: 1.00     Years: 50.00     Pack years: 50.00     Types: Cigarettes     Start date: 1968   • Smokeless tobacco: Never Used   • Tobacco comment: Started smoking at age 13.   Substance and Sexual Activity   • Alcohol use: Yes     Comment: RECOVERING ALCOHOLIC 07-   • Drug use: No   • Sexual activity: Defer         ALLERGIES  Patient has no known allergies.        REVIEW OF SYSTEMS  Review of Systems   Constitutional: Positive for fatigue. Negative for activity change, appetite change and fever.   HENT: Negative for congestion and sore throat.    Eyes: Negative.    Respiratory: Positive for shortness of breath. Negative for cough.    Cardiovascular: Positive for leg swelling. Negative for chest pain.   Gastrointestinal: Positive for constipation. Negative for abdominal pain, diarrhea and vomiting.   Endocrine: Negative.    Genitourinary: Positive for decreased urine volume, difficulty urinating, hematuria and urgency. Negative for dysuria.   Musculoskeletal: Positive for arthralgias. Negative for neck pain.   Skin: Negative for rash and wound.   Allergic/Immunologic: Negative.    Neurological: Negative for weakness, numbness and headaches.   Hematological: Negative.    Psychiatric/Behavioral: Negative.    All other systems reviewed and are negative.       All systems reviewed and negative except for those discussed in HPI.       PHYSICAL EXAM    I have reviewed the triage vital signs and nursing notes.    ED Triage Vitals   Temp Heart Rate Resp BP SpO2   08/17/20 2152 08/17/20 2151 08/17/20 2151 08/17/20 2151 08/17/20 2151   96.6 °F (35.9 °C) 58 22 128/85 98 %      Temp src Heart Rate Source Patient Position BP Location FiO2 (%)   08/17/20 2152 -- -- -- --   Tympanic           Physical Exam   Constitutional: Pt. is awake and alert.  He is chronically ill-appearing.  He is oriented x3.  He is very pleasant and cooperative.  HENT: Normocephalic and atraumatic,   EOM are normal. Pupils are equal, round, and reactive to light. Oropharynx moist/nonerythematous.  Neck: Normal range of motion. Neck supple.  Minimal JVD present. No tracheal deviation present. No thyromegaly present.   Cardiovascular: Normal rate, regular rhythm and normal heart sounds. Exam reveals no gallop and no friction rub.   No murmur heard.  Pulmonary/Chest: Effort normal and breath sounds normal. No stridor. No respiratory distress. No wheezes, no rales.   Abdominal: Soft. Bowel sounds are normal. No distension. There is no tenderness. There is no rebound and no guarding.   Musculoskeletal: Decreased range of motion.  He has severe pitting edema of his lower extremities to his mid thigh.     Neurological: Pt. is alert and oriented to person, place, and time.  Face is symmetric.  He is bedridden.    Skin: Skin is warm and dry. No rash noted. Pt. is not diaphoretic. No erythema.   Psychiatric: Mood, affect and judgment normal.   Nursing note and vitals reviewed.        LAB RESULTS  Recent Results (from the past 24 hour(s))   Comprehensive Metabolic Panel    Collection Time: 08/17/20 10:15 PM   Result Value Ref Range    Glucose 104 (H) 65 - 99 mg/dL    BUN 31 (H) 8 - 23 mg/dL    Creatinine 0.94 0.76 - 1.27 mg/dL    Sodium 128 (L) 136 - 145 mmol/L    Potassium 4.5 3.5 - 5.2 mmol/L    Chloride 87 (L) 98 - 107 mmol/L    CO2 26.9 22.0 - 29.0 mmol/L    Calcium 9.9 8.6 - 10.5 mg/dL    Total Protein 6.8 6.0 - 8.5 g/dL    Albumin 4.30 3.50 - 5.20 g/dL    ALT (SGPT) 44 (H) 1 - 41 U/L    AST (SGOT) 67 (H) 1 - 40 U/L    Alkaline Phosphatase 119 (H) 39 - 117 U/L    Total Bilirubin 2.3 (H) 0.0 - 1.2 mg/dL    eGFR Non African Amer 80 >60 mL/min/1.73    Globulin 2.5 gm/dL    A/G Ratio 1.7 g/dL    BUN/Creatinine Ratio 33.0 (H) 7.0 - 25.0    Anion Gap 14.1 5.0 - 15.0 mmol/L   BNP    Collection Time: 08/17/20 10:15 PM   Result Value Ref Range    proBNP 611.9 0.0 - 900.0 pg/mL   Troponin    Collection Time: 08/17/20 10:15 PM    Result Value Ref Range    Troponin T 0.073 (C) 0.000 - 0.030 ng/mL   Lactic Acid, Plasma    Collection Time: 08/17/20 10:15 PM   Result Value Ref Range    Lactate 1.5 0.5 - 2.0 mmol/L   Procalcitonin    Collection Time: 08/17/20 10:15 PM   Result Value Ref Range    Procalcitonin 3.39 (H) 0.00 - 0.25 ng/mL   CK    Collection Time: 08/17/20 10:15 PM   Result Value Ref Range    Creatine Kinase 196 20 - 200 U/L   Magnesium    Collection Time: 08/17/20 10:15 PM   Result Value Ref Range    Magnesium 1.8 1.6 - 2.4 mg/dL   CBC Auto Differential    Collection Time: 08/17/20 10:15 PM   Result Value Ref Range    WBC 8.38 3.40 - 10.80 10*3/mm3    RBC 5.06 4.14 - 5.80 10*6/mm3    Hemoglobin 15.5 13.0 - 17.7 g/dL    Hematocrit 44.8 37.5 - 51.0 %    MCV 88.5 79.0 - 97.0 fL    MCH 30.6 26.6 - 33.0 pg    MCHC 34.6 31.5 - 35.7 g/dL    RDW 14.4 12.3 - 15.4 %    RDW-SD 46.3 37.0 - 54.0 fl    MPV 10.5 6.0 - 12.0 fL    Platelets 129 (L) 140 - 450 10*3/mm3    Neutrophil % 83.0 (H) 42.7 - 76.0 %    Lymphocyte % 7.9 (L) 19.6 - 45.3 %    Monocyte % 6.4 5.0 - 12.0 %    Eosinophil % 0.2 (L) 0.3 - 6.2 %    Basophil % 0.5 0.0 - 1.5 %    Neutrophils, Absolute 6.95 1.70 - 7.00 10*3/mm3    Lymphocytes, Absolute 0.66 (L) 0.70 - 3.10 10*3/mm3    Monocytes, Absolute 0.54 0.10 - 0.90 10*3/mm3    Eosinophils, Absolute 0.02 0.00 - 0.40 10*3/mm3    Basophils, Absolute 0.04 0.00 - 0.20 10*3/mm3   Urinalysis With Culture If Indicated - Urine, Catheter    Collection Time: 08/17/20 11:46 PM   Result Value Ref Range    Color, UA Dark Yellow (A) Yellow, Straw    Appearance, UA Clear Clear    pH, UA <=5.0 5.0 - 8.0    Specific Gravity, UA 1.017 1.005 - 1.030    Glucose, UA Negative Negative    Ketones, UA Negative Negative    Bilirubin, UA Negative Negative    Blood, UA Negative Negative    Protein, UA Negative Negative    Leuk Esterase, UA Trace (A) Negative    Nitrite, UA Negative Negative    Urobilinogen, UA 1.0 E.U./dL 0.2 - 1.0 E.U./dL   Urinalysis,  Microscopic Only - Urine, Catheter    Collection Time: 08/17/20 11:46 PM   Result Value Ref Range    RBC, UA 0-2 None Seen, 0-2 /HPF    WBC, UA 0-2 None Seen, 0-2 /HPF    Bacteria, UA None Seen None Seen /HPF    Squamous Epithelial Cells, UA 0-2 None Seen, 0-2 /HPF    Hyaline Casts, UA 0-2 None Seen /LPF    Methodology Automated Microscopy        Ordered the above labs and independently reviewed the results.        RADIOLOGY  Xr Chest 1 View    Result Date: 8/17/2020  XR CHEST 1 VW-  8/17/2020  HISTORY: Shortness of breath.  Heart size is within normal limits. There are some mild haziness of the right lung base which may represent some mild atelectasis or developing infiltrate. There is increased soft tissue in the right suprahilar region which may represent the ill-defined mass seen on the CT scan of 5/29/2020.  Left lung appears relatively clear.      Mild increased density of the right lung base may represent mild atelectasis or developing pneumonia. 2. Increased soft tissue in the right suprahilar region may represent the known soft tissue mass seen on the CT scan of 5/29/2020. 3. Please see dictation for the CT of the chest from 5/29/2020.  This report was finalized on 8/17/2020 11:04 PM by Dr. Jordy Little M.D.        I ordered the above noted radiological studies. Reviewed by me and discussed with radiologist.  See dictation for official radiology interpretation.      PROCEDURES    Procedures      MEDICATIONS GIVEN IN ER    Medications   sodium chloride 0.9 % flush 10 mL (10 mL Intravenous Given 8/17/20 2251)   furosemide (LASIX) injection 40 mg (has no administration in time range)   HYDROcodone-acetaminophen (NORCO) 7.5-325 MG per tablet 1 tablet (1 tablet Oral Given 8/17/20 2250)   ondansetron (ZOFRAN) injection 4 mg (4 mg Intravenous Given 8/17/20 2250)         PROGRESS, DATA ANALYSIS, CONSULTS, AND MEDICAL DECISION MAKING    Any/all labs have been independently reviewed by me.  Any/all radiology  studies have been reviewed by me and discussed with radiologist dictating the report.   EKG's independently viewed and interpreted by me.  Discussion below represents my analysis of pertinent findings related to patient's condition, differential diagnosis, treatment plan and final disposition.      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  He also has progressive neurologic deterioration, suspicious for ALS.  He has been referred for hospice care, however he is still considering chemotherapy for his lung cancer in order to improve his neurologic status.    [WC]   2225 EKG performed at 2223 and interpreted by me shows normal sinus rhythm with frequent PACs.  MA intervals are normal.  There are low voltage in the extremity leads.  QRS complexes are otherwise unremarkable.  There are nonspecific ST-T changes.  There is no significant change when compared to 7/8/2020.    [WC]   2243 Down from 164 last month   Platelets(!): 129 [WC]   2348 Jordan catheter was placed with approximately 2300 cc of urine output.    [WC]   Tue Aug 18, 2020   0005 The patient feels much better after having had a Jordan catheter placed.  He is hesitant about going home with the amount of swelling and discomfort he is having.  It would not be unreasonable to admit him to the hospital overnight for IV diuretics and symptom control.    [WC]   0021 Discussed with HEATHER Miller (on call for [facility]).  She accepts the patient on behalf of Dr. Calle.  3 park bed requested.    [WC]      ED Course User Index  [WC] Jose Quinteros MD       AS OF 00:22 VITALS:    BP - 101/63  HR - 72  TEMP - 96.6 °F (35.9 °C) (Tympanic)  02 SATS - 97%        DIAGNOSIS  Final diagnoses:   Acute urinary retention   Elevated troponin   Neuroendocrine carcinoma of lung (CMS/HCC)          DISPOSITION  Admitted           Jose Quinteros MD  08/18/20 0039

## 2020-08-18 NOTE — CONSULTS
[facility] GROUP INITIAL INPATIENT CONSULTATION NOTE    REASON FOR CONSULTATION: Small cell lung carcinoma    HISTORY OF PRESENT ILLNESS:  Ryley Vazquez Jr. is a 65 y.o. male who we are asked to see today in consultation for his diagnosis of small cell lung carcinoma.  The patient is a 65 year-old male  with a history including rheumatoid arthritis in [id number].  He was reviewed April 15, 2020 by rheumatology recovering from a recurrent urinary tract and prostatic infection requiring multiple antibiotic therapies.  At this point he has began to have weakness and left upper extremity and undergoing assessment by neurology.  Visits with neurology had been hindered by the COVID-19 epidemic though he was seen May 11 by Dr. Kalia Doll with the patient's left arm weakness developing over a year to the point that his function had been severely compromised and now is developing bilateral leg weakness.  His history includes a previous posterior laminectomy for spinal stenosis 20 years previous at [facility] with numbness in the hands and feet subsequent to that though his recent symptoms were more severe.  Plans were made for radiologic tests including EMG, laboratory studies and MRI of the cervical and thoracic spine.  The patient was seen by neurosurgery May 14 having had these procedures done but having difficulty breathing and a portion of the plain films aborted.  He did have evidence of L5-S1 foraminal stenosis, central stenosis L4-5, L3-4 with central stenosis and lateral recess stenosis.  CT scan cervical spine looked normal and there was concern of possible ALS with the patient being sent to Dr. Kris Price at [facility].     His symptoms worsen however now respiratorily as well as the patient becoming considerably immobile with difficulty ambulating.     A follow-up CT scan of the chest revealed nodular lesions extending from the medial aspect of the right apex to the right hilum with  right hilar lymphadenopathy and consideration of likely lung cancer.  A CT-guided biopsy was attempted though there were problems obtaining this and the procedure was aborted.  The patient's case was discussed at thoracic conference and biopsy via mediastinoscopy was planned.At this point additional tests including MAG antibodies, ST PG antibodies, Purkinje cell/neuronal nuclear IgG and GM 1 antibodies were obtained with the GM 1 antibodies IgG/IgM quite elevated at 207.  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     The patient was to be seen in office August 6, 2020 and, unfortunately, has missed 2 appointments July 22 and now today August 6.  When he did not arrive I contacted him to discuss his circumstances in particular to make him aware that treatment for his small cell lung cancer would very likely improve his neurologic status.  In contacting him later in the morning he is fairly certain that he does not wish chemotherapy but was surprised about the possibility of improving his neuropathy.  As result he wishes to discuss this at a later appointment.  He is, however, deteriorating and would require hospice care now.  The patient was seen by telephone consultation August 18, 2020 and he indicated that he wished hospice consultation which was put into place that day.     The patient is now seen having been admitted August 18 presenting with increased swelling in the lower extremities, worsening shortness of breath, decreased urine output darkening in color.  Urinary catheter was placed and studies have gone on to demonstrate atelectasis in the right lower lobe with significant anasarca, elevated procalcitonin.  Patient admitted for additional palliative care and indicates that he is improved wonderfully with additional diuretic and Jordan catheter placement.        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[facility])    • Numbness and tingling of upper and lower extremities of both sides    • Recovering alcoholic (CMS/[facility]) 07/22/2019    LAST DRINK 07-   • Sinusitis    • Sleep apnea     cpap     Social History   Social History     Socioeconomic History   • Marital status:      Spouse name: Yin   • Number of children: Not on file   • Years of education: Not on file   • Highest education level: Not on file   Occupational History   • Occupation:      Employer: RETIRED   Tobacco Use   • Smoking status: Current Every Day Smoker     Packs/day: 1.00     Years: 50.00     Pack years: 50.00     Types: Cigarettes     Start date: 1968   • Smokeless tobacco: Never Used   • Tobacco comment: Started smoking at age 13.   Substance and Sexual Activity   • Alcohol use: Yes     Comment: RECOVERING ALCOHOLIC 07-   • Drug use: No   • Sexual activity: Defer     Family History  Family History   Problem Relation Age of Onset   • Asthma Mother    • Cancer Father         COLON   • Arthritis Father    • Hypertension Father    • Diabetes Father    • Heart disease Brother    • Cancer Other    • Arthritis Other    • Alcohol abuse Other    • Hypertension Maternal Grandmother    • Diabetes Maternal Grandmother    • Stroke Paternal Grandmother    • Hypertension Paternal Grandmother    • Diabetes Paternal Grandmother    • Heart disease Paternal Grandfather    • Malig Hyperthermia Neg Hx      Medications    Current Facility-Administered Medications:   •  acetaminophen (TYLENOL) tablet 650 mg, 650 mg, Oral, Q4H PRN **OR** acetaminophen (TYLENOL) 160  MG/5ML solution 650 mg, 650 mg, Oral, Q4H PRN **OR** acetaminophen (TYLENOL) suppository 650 mg, 650 mg, Rectal, Q4H PRN, Nancy Norman APRN  •  bisacodyl (DULCOLAX) EC tablet 5 mg, 5 mg, Oral, Daily PRN, Nancy Norman APRN  •  calcium carbonate (TUMS) chewable tablet 500 mg (200 mg elemental), 2 tablet, Oral, BID PRN, Nancy Norman APRN  •  fluticasone (FLONASE) 50 MCG/ACT nasal spray 1 spray, 1 spray, Each Nare, Daily, Flip Rendon MD  •  folic acid (FOLVITE) tablet 1 mg, 1 mg, Oral, Daily, Flip Rendon MD  •  furosemide (LASIX) tablet 20 mg, 20 mg, Oral, Daily, Flip Rendon MD  •  guaiFENesin (MUCINEX) 12 hr tablet 1,200 mg, 1,200 mg, Oral, BID, Flip Rendon MD  •  ipratropium-albuterol (DUO-NEB) nebulizer solution 3 mL, 3 mL, Nebulization, Q4H PRN, Flip Rendon MD  •  ipratropium-albuterol (DUO-NEB) nebulizer solution 3 mL, 3 mL, Nebulization, 4x Daily - RT, Flip Rendon MD  •  mometasone-formoterol (DULERA 200) inhaler 2 puff, 2 puff, Inhalation, BID - RT, Flip Rendon MD  •  montelukast (SINGULAIR) tablet 10 mg, 10 mg, Oral, Nightly, Flip Rendon MD  •  NIFEdipine XL (PROCARDIA XL) 24 hr tablet 60 mg, 60 mg, Oral, BID, Flip Rendon MD  •  ondansetron (ZOFRAN) tablet 4 mg, 4 mg, Oral, Q6H PRN **OR** ondansetron (ZOFRAN) injection 4 mg, 4 mg, Intravenous, Q6H PRN, Nancy Norman APRN  •  [COMPLETED] Insert peripheral IV, , , Once **AND** sodium chloride 0.9 % flush 10 mL, 10 mL, Intravenous, PRN, Jose Quinteros MD, 10 mL at 08/17/20 2416  •  sodium chloride 0.9 % flush 10 mL, 10 mL, Intravenous, Q12H, Nancy Norman APRN, 10 mL at 08/18/20 0516  •  sodium chloride 0.9 % flush 10 mL, 10 mL, Intravenous, PRN, Nancy Norman APRN  Allergies  No Known Allergies  Review of Systems  Constitutional: Positive for activity change, appetite change, fatigue and unexpected weight change.   HENT: Negative.    Eyes: Negative.    Respiratory: Positive for  shortness of breath.    Cardiovascular: Negative.    Gastrointestinal: Negative.    Genitourinary: Positive for difficulty urinating.   Musculoskeletal: Positive for gait problem.   Skin: Negative.    Neurological: Positive for tremors, weakness and numbness.   Psychiatric/Behavioral: Negative.      Objective:     Vitals:    08/18/20 0613   BP: 108/71   Pulse: 76   Resp: 18   Temp: 97.1 °F (36.2 °C)   SpO2: 98%     GENERAL:  Well-developed, well-nourished in no acute distress.   SKIN:  Warm, dry without rashes, purpura or petechiae.  HEAD:  Normocephalic.  EYES:  Pupils equal, round and reactive to light.  EOMs intact.  Conjunctivae normal.  EARS:  Hearing intact.  NOSE:  Septum midline.  No excoriations or nasal discharge.  MOUTH:  Tongue is well-papillated; no stomatitis or ulcers.  Lips normal.  THROAT:  Oropharynx without lesions or exudates.  NECK:  Supple with good range of motion; no thyromegaly or masses, no JVD.  LYMPHATICS:  No cervical, supraclavicular, axillary or inguinal adenopathy.  CHEST:  Lungs clear to percussion and auscultation. Good airflow.  CARDIAC:  Regular rate and rhythm without murmurs, rubs or gallops. Normal S1,S2.  ABDOMEN:  Soft, nontender with no organomegaly or masses.  EXTREMITIES: 3+ lower extremity edema (evidently improved after admission)  NEUROLOGICAL:  Cranial Nerves II-XII grossly intact.  No focal neurological deficits.  PSYCHIATRIC:  Normal affect and mood.        DIAGNOSTIC DATA:  Results from last 7 days   Lab Units 08/17/20  2215   WBC 10*3/mm3 8.38   HEMOGLOBIN g/dL 15.5   HEMATOCRIT % 44.8   PLATELETS 10*3/mm3 129*     Results from last 7 days   Lab Units 08/18/20  0639 08/17/20  2215   SODIUM mmol/L 129* 128*   POTASSIUM mmol/L 4.1 4.5   CHLORIDE mmol/L 88* 87*   CO2 mmol/L 27.2 26.9   BUN mg/dL 29* 31*   CREATININE mg/dL 0.67* 0.94   CALCIUM mg/dL 9.0 9.9   BILIRUBIN mg/dL  --  2.3*   ALK PHOS U/L  --  119*   ALT (SGPT) U/L  --  44*   AST (SGOT) U/L  --  67*    GLUCOSE mg/dL 94 104*       IMAGING:     Assessment/Plan   Assessment/Plan:   This is a 65 y.o. male with:    65-year-old male with a history of rheumatoid arthritis on therapy, recurrent urinary tract infections and more recent development into the spring of progressive neurologic dysfunction initially involving his left arm and, shortly thereafter, bilateral lower extremities.  This is undergone studies for his progressive neurologic deterioration he has been reviewed by neurology and neurosurgery and there has been concern about ALS.  Recently were a follow-up CT scan of the chest revealed lesions in the medial aspect of the right hilum with right hilar lymphadenopathy and consideration of lung carcinoma.  CT-guided biopsy was aborted as result of his inability to position properly secondary to general discomfort and his case was discussed in conference leading to mediastinoscopy and the eventual diagnosis of high-grade small cell neuroendocrine carcinoma by mediastinoscopy July 10, 2020.     He has not been able to attend now to appointments and he is contacted by home to determine his status and informed him that he might actually improve neurologically if we treat his small cell lung cancer.  We have proceeded with the least a portion of paraneoplastic testing with additional tests including MAG antibodies, ST PG antibodies, Purkinje cell/neuronal nuclear IgG and GM 1 antibodies were obtained with the GM 1 antibodies IgG/IgM quite elevated at 207.  After discussion with the patient by phone August 18 we went on to ask hospice to become involved in his care.     Fortunately he is now presented with generally worsening performance status including anasarca and urinary retention and increasing shortness of breath.  At present palliative care options have been discussed and will continue.  The patient has clearly improved with Jordan catheter placement and diuretic use.  At present he continues his current  palliative care options possibly returning home as possible.    Christopher Sandoval MD

## 2020-08-18 NOTE — PROGRESS NOTES
Spoke with Yin at [facility] and informed that pt admitted to room P496. Questioned if [facility] had a living will on file, Yin stated that all they had was a verbal DNR. Soco Bruce RN

## 2020-08-18 NOTE — PROGRESS NOTES
"Pharmacy Consult - Lovenox    Ryley Vazquez Jr. has been consulted for pharmacy to dose enoxaparin for VTE prophylaxis per AGUSTÍN Brink's request.       Relevant clinical data and objective history reviewed:  65 y.o. 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    Lab Results   Component Value Date    WBC 10.08 08/18/2020    HGB 13.9 08/18/2020    HCT 39.8 08/18/2020    MCV 87.1 08/18/2020     (L) 08/18/2020     Lab Results   Component Value Date    GLUCOSE 94 08/18/2020    CALCIUM 9.0 08/18/2020     (L) 08/18/2020    K 4.1 08/18/2020    CO2 27.2 08/18/2020    CL 88 (L) 08/18/2020    BUN 29 (H) 08/18/2020    CREATININE 0.67 (L) 08/18/2020    EGFRIFNONA 119 08/18/2020    BCR 43.3 (H) 08/18/2020    ANIONGAP 13.8 08/18/2020       Estimated Creatinine Clearance: 102.9 mL/min (A) (by C-G formula based on SCr of 0.67 mg/dL (L)).        Assessment/Plan    1. Start enoxaparin 40mg q24h per renal fx   Thank you for the consult,    Juan F Vega RPH    "

## 2020-08-18 NOTE — PLAN OF CARE
Pt got norco once this shift, can have morphine prn, pt talked with [facility] and wants to be full comfort care. Will cont to monitor

## 2020-08-18 NOTE — PROGRESS NOTES
Discharge Planning Assessment  [facility]     Patient Name: Ryley Vazquez Jr.  MRN: [id number]  Today's Date: 8/18/2020    Admit Date: 8/17/2020    Discharge Needs Assessment    No documentation.       Discharge Plan     Row Name 08/18/20 0946       Plan    Plan Comments  The patient was current with [facility] at home for lung cancer. Waiting on [facility]/RN to round and update CCP on coverage. NATANAEL rFederick RN, CCP.         Destination      Coordination has not been started for this encounter.      Durable Medical Equipment      Coordination has not been started for this encounter.      Dialysis/Infusion      Coordination has not been started for this encounter.      Home Medical Care      Coordination has not been started for this encounter.      Therapy      Coordination has not been started for this encounter.      Community Resources      Coordination has not been started for this encounter.          Demographic Summary    No documentation.       Functional Status    No documentation.       Psychosocial    No documentation.       Abuse/Neglect    No documentation.       Legal    No documentation.       Substance Abuse    No documentation.       Patient Forms    No documentation.           Ernestina Frederick RN

## 2020-08-18 NOTE — PLAN OF CARE
"  Problem: Patient Care Overview  Goal: Plan of Care Review  Outcome: Ongoing (interventions implemented as appropriate)  Flowsheets (Taken 8/18/2020 1112)  Progress: declining  Plan of Care Reviewed With: patient  Outcome Summary: Patient seen for clinical swallow assessment. Pt with known lung cancer and probable ALS per chart, unable to feed himself. Despite hospice status, patient was agreeable to swallow assessment. Wife indicated the patient has no difficulty with liquids, but will masticate meat and \"spit it out\". Pt reports difficulty with mastication d/t missing dentition. Patient was on regular and thins during assessment. Baseline cough. Laryngeal elevation appeared appropriate. No overt s/s of aspiration with ice or thins via spoon. Belching and suspected backflow observed with thins and nectar via straw with delayed cough. Wet voice also appreciated with thins. Belching persisted with honey via straw, but no coughing or voice change appreciated. Adequate AP transit with puree. Rotary and munching mastication with mech soft and regular. No oral residue post swallow. SLP recs regular, ground meats, and honey. Pt refused diet modifications until results from VFSS. Of note, RD change patient's diet to mech soft, ground meats while SLP was evaluating the patient. Will follow for VFSS next date per patient request.    Patient was not wearing a face mask during this therapy encounter. Therapist used appropriate personal protective equipment including mask, eye protection and gloves.  Mask used was standard procedure mask. Appropriate PPE was worn during the entire therapy session. Hand hygiene was completed before and after therapy session. Patient is not in enhanced droplet precautions.        "

## 2020-08-18 NOTE — H&P
"    Patient Name:  Ryley Vazquez Jr.  YOB: 1955  MRN:  [id number]  Admit Date:  8/17/2020  Patient Care Team:  Braulio Sorto MD as PCP - General  Braulio Sorto MD as PCP - Claims Attributed  Erik Mclaughlin MD as Consulting Physician (Allergy and Immunology)  Bayron Jones III, MD as Referring Physician (Thoracic Surgery)  Christopher Sandoval MD as Consulting Physician (Hematology and Oncology)      Subjective   History Present Illness     Chief Complaint   Patient presents with   • Shortness of Breath       Mr. Vazquez is a 65 y.o. smoker with a history of rheumatoid arthritis, recurrent UTI, prostate infection, COPD, high-grade neuroendocrine carcinoma of the lung diagnosed 7/10/2020 followed by [facility]. He also has probable ALS followed by [facility]/neurology with immobility and minimal use of his upper extremities as well.  He is DNR status and a hospice patient.  He presents to [facility] complaining of increased swelling in his legs that has been progressive in the last few weeks and he now has SOB in the last few days.  He has a mild dry cough.  He has chronic shortness of breath but states it is been significantly worse.  He has had decreased urine output and urine is dark like \"root beer.\"  Due to urine color and decreased output he stopped taking his Lasix in the last week which likely contributed to his increased shortness of breath. Minimal p.o. intake in the last few days with no bowel movement in several days.  He denies chest pain, fever, chills, palpitations, nausea, vomiting, or abdominal pain.   Patient lives at home with his wife who is his primary caregiver and assists with all ADLs.  She was helping him smoke 7 cigarettes a day but stopped 2 days ago.       History of Present Illness  Review of Systems   Constitutional: Positive for appetite change and fatigue. Negative for chills, fever and unexpected weight change.   HENT: Positive for trouble " swallowing. Negative for congestion.    Eyes: Negative for photophobia and visual disturbance.   Respiratory: Positive for cough and shortness of breath. Negative for choking and chest tightness.    Cardiovascular: Positive for leg swelling. Negative for chest pain.   Gastrointestinal: Positive for constipation. Negative for abdominal distention, abdominal pain, blood in stool, diarrhea, nausea, rectal pain and vomiting.   Endocrine: Negative for polydipsia, polyphagia and polyuria.   Genitourinary: Positive for decreased urine volume, difficulty urinating and dysuria.   Musculoskeletal:        Immobile   Skin: Negative for color change.   Neurological: Positive for numbness. Negative for dizziness and light-headedness.   Hematological: Does not bruise/bleed easily.   Psychiatric/Behavioral: Negative.  Negative for confusion.        Personal History     Past Medical History:   Diagnosis Date   • Arthritis     rheumatoid   • Asthmatic bronchitis    • Cancer (CMS/HCC)    • COPD (chronic obstructive pulmonary disease) (CMS/HCC)    • Difficulty walking    • GERD (gastroesophageal reflux disease)    • History of gastritis    • Hyperlipidemia    • Hypertension    • Lung cancer (CMS/HCC)     STAGE III   • Movement disorder    • 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HCC)    • Numbness and tingling of upper and lower extremities of both sides    • Recovering alcoholic (CMS/HCC) 07/22/2019    LAST DRINK 07-   • Sinusitis    • Sleep apnea     cpap     Past Surgical History:   Procedure Laterality Date   • ABDOMINAL SURGERY     • ABDOMINAL SURGERY     • CERVICAL FUSION N/A 2005   • CERVICAL LAMINECTOMY     • COLONOSCOPY  2014    polyps   • GASTRIC BYPASS     • HERNIA REPAIR     • MEDIASTINOSCOPY N/A 7/10/2020    Procedure: MEDIASTINOSCOPY WITH BIOPSY;  Surgeon: Bayron Jones III, MD;  Location: [facility];  Service: Thoracic;  Laterality: N/A;   • ROTATOR CUFF REPAIR Right 1985     Family History   Problem Relation  Age of Onset   • Asthma Mother    • Cancer Father         COLON   • Arthritis Father    • Hypertension Father    • Diabetes Father    • Heart disease Brother    • Cancer Other    • Arthritis Other    • Alcohol abuse Other    • Hypertension Maternal Grandmother    • Diabetes Maternal Grandmother    • Stroke Paternal Grandmother    • Hypertension Paternal Grandmother    • Diabetes Paternal Grandmother    • Heart disease Paternal Grandfather    • Malig Hyperthermia Neg Hx      Social History     Tobacco Use   • Smoking status: Current Every Day Smoker     Packs/day: 1.00     Years: 50.00     Pack years: 50.00     Types: Cigarettes     Start date: 1968   • Smokeless tobacco: Never Used   • Tobacco comment: Started smoking at age 13.   Substance Use Topics   • Alcohol use: Yes     Comment: RECOVERING ALCOHOLIC 07-   • Drug use: No     No current facility-administered medications on file prior to encounter.      Current Outpatient Medications on File Prior to Encounter   Medication Sig Dispense Refill   • Acetaminophen (TYLENOL EXTRA STRENGTH PO) Take  by mouth 3 (Three) Times a Day As Needed.     • acetaminophen (TYLENOL) 500 MG tablet Take 1,000 mg by mouth Every 6 (Six) Hours As Needed for Mild Pain .     • flunisolide (NASALIDE) 25 MCG/ACT (0.025%) solution nasal spray 1 spray Every 12 (Twelve) Hours.  10   • folic acid (FOLVITE) 1 MG tablet Take 1 mg by mouth Daily.     • furosemide (LASIX) 20 MG tablet TAKE 1 TABLET BY MOUTH EVERY DAY (Patient taking differently: Take 20 mg by mouth Every Other Day.) 90 tablet 0   • guaiFENesin (Mucinex) 600 MG 12 hr tablet Take 2 tablets by mouth 2 (Two) Times a Day. 60 tablet 1   • ipratropium (ATROVENT) 0.02 % nebulizer solution Take 500 mcg by nebulization 4 (Four) Times a Day.     • LORazepam (Ativan) 0.5 MG tablet 1 pill po 1 hour prior to procedure 5 tablet 0   • mometasone-formoterol (DULERA 200) 200-5 MCG/ACT inhaler Inhale 2 puffs 2 (Two) Times a Day.     •  montelukast (SINGULAIR) 10 MG tablet Take 10 mg by mouth Every Night.     • NIFEdipine XL (PROCARDIA XL) 60 MG 24 hr tablet TAKE 1 TABLET BY MOUTH EVERY DAY (Patient taking differently: Take 60 mg by mouth 2 (two) times a day.) 90 tablet 0   • VENTOLIN  (90 Base) MCG/ACT inhaler INHALE TWO PUFFS BY MOUTH EVERY 4 HOURS AS NEEDED FOR WHEEZING (Patient taking differently: Inhale 1 puff Every 4 (Four) Hours As Needed.) 1 inhaler 5   • [DISCONTINUED] methotrexate 2.5 MG tablet Take 17.5 mg by mouth 1 (One) Time Per Week. FRIDAY       No Known Allergies    Objective    Objective     Vital Signs  Temp:  [96.6 °F (35.9 °C)-97.1 °F (36.2 °C)] 97.1 °F (36.2 °C)  Heart Rate:  [58-93] 76  Resp:  [18-22] 18  BP: ()/(63-89) 108/71  SpO2:  [95 %-99 %] 98 %  on  Flow (L/min):  [2] 2;   Device (Oxygen Therapy): nasal cannula  Body mass index is 34.83 kg/m².    Physical Exam   Constitutional: He is oriented to person, place, and time. He appears well-developed and well-nourished.   Acute on chronic ill appearance, appears much older than stated age, conversational dyspnea   HENT:   Head: Normocephalic and atraumatic.   Mouth/Throat: Oropharynx is clear and moist.   Eyes: Conjunctivae and EOM are normal. No scleral icterus.   Neck: Normal range of motion. No JVD present. No tracheal deviation present.   Cardiovascular: Normal rate and regular rhythm.   Murmur heard.  Pulmonary/Chest: Effort normal. No respiratory distress. He has no wheezes. He has rales (Right lower lobe).   Decreased breath sounds throughout.  Mild conversational dyspnea   Abdominal: Soft. Bowel sounds are normal. He exhibits no distension and no mass. There is no tenderness. There is no rebound and no guarding.   Musculoskeletal: Normal range of motion. He exhibits edema (Severe pitting and edema of bilateral lower extremities extending to thighs). He exhibits no tenderness.   Neurological: He is alert and oriented to person, place, and time. No  cranial nerve deficit. Right Babinski's sign: decreased strength all 4 extremities    Bedridden   Skin: Skin is warm and dry. He is not diaphoretic.   Psychiatric: He has a normal mood and affect. Judgment normal.   Nursing note and vitals reviewed.      Results Review:  I reviewed the patient's new clinical results.  I reviewed the patient's new imaging results and agree with the interpretation.  I reviewed the patient's other test results and agree with the interpretation  I personally viewed and interpreted the patient's EKG/Telemetry data  Discussed with ED provider.    Lab Results (last 24 hours)     Procedure Component Value Units Date/Time    CBC & Differential [[id number]] Collected:  08/17/20 2215    Specimen:  Blood Updated:  08/17/20 2228    Narrative:       The following orders were created for panel order CBC & Differential.  Procedure                               Abnormality         Status                     ---------                               -----------         ------                     CBC Auto Differential[[id number]]        Abnormal            Final result                 Please view results for these tests on the individual orders.    Comprehensive Metabolic Panel [[id number]]  (Abnormal) Collected:  08/17/20 2215    Specimen:  Blood Updated:  08/17/20 2253     Glucose 104 mg/dL      BUN 31 mg/dL      Creatinine 0.94 mg/dL      Sodium 128 mmol/L      Potassium 4.5 mmol/L      Chloride 87 mmol/L      CO2 26.9 mmol/L      Calcium 9.9 mg/dL      Total Protein 6.8 g/dL      Albumin 4.30 g/dL      ALT (SGPT) 44 U/L      AST (SGOT) 67 U/L      Alkaline Phosphatase 119 U/L      Total Bilirubin 2.3 mg/dL      eGFR Non African Amer 80 mL/min/1.73      Globulin 2.5 gm/dL      A/G Ratio 1.7 g/dL      BUN/Creatinine Ratio 33.0     Anion Gap 14.1 mmol/L     Narrative:       GFR Normal >60  Chronic Kidney Disease <60  Kidney Failure <15      BNP [[id number]]  (Normal) Collected:  08/17/20 2215    Specimen:   "Blood Updated:  08/17/20 2249     proBNP 611.9 pg/mL     Narrative:       Among patients with dyspnea, NT-proBNP is highly sensitive for the detection of acute congestive heart failure. In addition NT-proBNP of <300 pg/ml effectively rules out acute congestive heart failure with 99% negative predictive value.    Results may be falsely decreased if patient taking Biotin.      Troponin [[id number]]  (Abnormal) Collected:  08/17/20 2215    Specimen:  Blood Updated:  08/17/20 2256     Troponin T 0.073 ng/mL     Narrative:       Troponin T Reference Range:  <= 0.03 ng/mL-   Negative for AMI  >0.03 ng/mL-     Abnormal for myocardial necrosis.  Clinicians would have to utilize clinical acumen, EKG, Troponin and serial changes to determine if it is an Acute Myocardial Infarction or myocardial injury due to an underlying chronic condition.       Results may be falsely decreased if patient taking Biotin.      Lactic Acid, Plasma [[id number]]  (Normal) Collected:  08/17/20 2215    Specimen:  Blood Updated:  08/17/20 2240     Lactate 1.5 mmol/L     Procalcitonin [[id number]]  (Abnormal) Collected:  08/17/20 2215    Specimen:  Blood Updated:  08/17/20 2258     Procalcitonin 3.39 ng/mL     Narrative:       As a Marker for Sepsis (Non-Neonates):   1. <0.5 ng/mL represents a low risk of severe sepsis and/or septic shock.  1. >2 ng/mL represents a high risk of severe sepsis and/or septic shock.    As a Marker for Lower Respiratory Tract Infections that require antibiotic therapy:  PCT on Admission     Antibiotic Therapy             6-12 Hrs later  > 0.5                Strongly Recommended            >0.25 - <0.5         Recommended  0.1 - 0.25           Discouraged                   Remeasure/reassess PCT  <0.1                 Strongly Discouraged          Remeasure/reassess PCT      As 28 day mortality risk marker: \"Change in Procalcitonin Result\" (> 80 % or <=80 %) if Day 0 (or Day 1) and Day 4 values are available. Refer to " [email or web address]-pct-[email or web address]/   Change in PCT <=80 %   A decrease of PCT levels below or equal to 80 % defines a positive change in PCT test result representing a higher risk for 28-day all-cause mortality of patients diagnosed with severe sepsis or septic shock.  Change in PCT > 80 %   A decrease of PCT levels of more than 80 % defines a negative change in PCT result representing a lower risk for 28-day all-cause mortality of patients diagnosed with severe sepsis or septic shock.                Results may be falsely decreased if patient taking Biotin.     CK [[id number]]  (Normal) Collected:  08/17/20 2215    Specimen:  Blood Updated:  08/17/20 2253     Creatine Kinase 196 U/L     Magnesium [[id number]]  (Normal) Collected:  08/17/20 2215    Specimen:  Blood Updated:  08/17/20 2253     Magnesium 1.8 mg/dL     CBC Auto Differential [[id number]]  (Abnormal) Collected:  08/17/20 2215    Specimen:  Blood Updated:  08/17/20 2228     WBC 8.38 10*3/mm3      RBC 5.06 10*6/mm3      Hemoglobin 15.5 g/dL      Hematocrit 44.8 %      MCV 88.5 fL      MCH 30.6 pg      MCHC 34.6 g/dL      RDW 14.4 %      RDW-SD 46.3 fl      MPV 10.5 fL      Platelets 129 10*3/mm3      Neutrophil % 83.0 %      Lymphocyte % 7.9 %      Monocyte % 6.4 %      Eosinophil % 0.2 %      Basophil % 0.5 %      Neutrophils, Absolute 6.95 10*3/mm3      Lymphocytes, Absolute 0.66 10*3/mm3      Monocytes, Absolute 0.54 10*3/mm3      Eosinophils, Absolute 0.02 10*3/mm3      Basophils, Absolute 0.04 10*3/mm3     Urinalysis With Culture If Indicated - Urine, Catheter [[id number]]  (Abnormal) Collected:  08/17/20 2346    Specimen:  Urine, Catheter Updated:  08/17/20 2359     Color, UA Dark Yellow     Appearance, UA Clear     pH, UA <=5.0     Specific Gravity, UA 1.017     Glucose, UA Negative     Ketones, UA Negative     Bilirubin, UA Negative     Blood, UA Negative     Protein, UA Negative     Leuk Esterase, UA Trace     Nitrite, UA Negative     Urobilinogen,  UA 1.0 E.U./dL    Urinalysis, Microscopic Only - Urine, Catheter [[id number]] Collected:  08/17/20 2346    Specimen:  Urine, Catheter Updated:  08/18/20 0000     RBC, UA 0-2 /HPF      WBC, UA 0-2 /HPF      Bacteria, UA None Seen /HPF      Squamous Epithelial Cells, UA 0-2 /HPF      Hyaline Casts, UA 0-2 /LPF      Methodology Automated Microscopy    COVID PRE-OP / PRE-PROCEDURE SCREENING ORDER (NO ISOLATION) - Swab, Nasopharynx [[id number]] Collected:  08/18/20 0038    Specimen:  Swab from Nasopharynx Updated:  08/18/20 0047    Narrative:       The following orders were created for panel order COVID PRE-OP / PRE-PROCEDURE SCREENING ORDER (NO ISOLATION) - Swab, Nasopharynx.  Procedure                               Abnormality         Status                     ---------                               -----------         ------                     COVID-19,BIOTAP, NP/OP S...[[id number]]                      In process                   Please view results for these tests on the individual orders.    COVID-19,BIOTAP, NP/OP SWAB IN TRANSPORT MEDIA OR SALINE 24-36 HR TAT - Swab, Nasopharynx [[id number]] Collected:  08/18/20 0038    Specimen:  Swab from Nasopharynx Updated:  08/18/20 0047    Basic Metabolic Panel [[id number]]  (Abnormal) Collected:  08/18/20 0639    Specimen:  Blood Updated:  08/18/20 0734     Glucose 94 mg/dL      BUN 29 mg/dL      Creatinine 0.67 mg/dL      Sodium 129 mmol/L      Potassium 4.1 mmol/L      Chloride 88 mmol/L      CO2 27.2 mmol/L      Calcium 9.0 mg/dL      eGFR Non African Amer 119 mL/min/1.73      BUN/Creatinine Ratio 43.3     Anion Gap 13.8 mmol/L     Narrative:       GFR Normal >60  Chronic Kidney Disease <60  Kidney Failure <15      CBC Auto Differential [[id number]]  (Abnormal) Collected:  08/18/20 0639    Specimen:  Blood Updated:  08/18/20 0814     WBC 10.08 10*3/mm3      RBC 4.57 10*6/mm3      Hemoglobin 13.9 g/dL      Hematocrit 39.8 %      MCV 87.1 fL      MCH 30.4 pg      MCHC 34.9  g/dL      RDW 14.0 %      RDW-SD 44.1 fl      MPV 10.6 fL      Platelets 105 10*3/mm3      Neutrophil % 84.7 %      Lymphocyte % 6.3 %      Monocyte % 6.5 %      Eosinophil % 0.2 %      Basophil % 0.2 %      Neutrophils, Absolute 8.54 10*3/mm3      Lymphocytes, Absolute 0.63 10*3/mm3      Monocytes, Absolute 0.66 10*3/mm3      Eosinophils, Absolute 0.02 10*3/mm3      Basophils, Absolute 0.02 10*3/mm3     Troponin [[id number]]  (Abnormal) Collected:  08/18/20 0639    Specimen:  Blood Updated:  08/18/20 0748     Troponin T 0.051 ng/mL     Narrative:       Troponin T Reference Range:  <= 0.03 ng/mL-   Negative for AMI  >0.03 ng/mL-     Abnormal for myocardial necrosis.  Clinicians would have to utilize clinical acumen, EKG, Troponin and serial changes to determine if it is an Acute Myocardial Infarction or myocardial injury due to an underlying chronic condition.       Results may be falsely decreased if patient taking Biotin.      Osmolality, Urine - Urine, Clean Catch [[id number]] Collected:  08/18/20 0711    Specimen:  Urine, Clean Catch Updated:  08/18/20 0758    Sodium, Urine, Random - Urine, Clean Catch [[id number]] Collected:  08/18/20 0711    Specimen:  Urine, Clean Catch Updated:  08/18/20 0758    Chloride, Urine, Random - Urine, Clean Catch [[id number]] Collected:  08/18/20 0711    Specimen:  Urine, Clean Catch Updated:  08/18/20 0758        Lab Results   Lab Value Date/Time    TROPONINT 0.051 (C) 08/18/2020 0639    TROPONINT 0.073 (C) 08/17/2020 2215     Results from last 7 days   Lab Units 08/18/20 0639 08/17/20 2215   SODIUM mmol/L 129* 128*   POTASSIUM mmol/L 4.1 4.5   CHLORIDE mmol/L 88* 87*   CO2 mmol/L 27.2 26.9   BUN mg/dL 29* 31*   CREATININE mg/dL 0.67* 0.94   CALCIUM mg/dL 9.0 9.9   BILIRUBIN mg/dL  --  2.3*   ALK PHOS U/L  --  119*   ALT (SGPT) U/L  --  44*   AST (SGOT) U/L  --  67*   GLUCOSE mg/dL 94 104*     Results from last 7 days   Lab Units 08/18/20  0639 08/17/20  2215   WBC 10*3/mm3 10.08  8.38   HEMOGLOBIN g/dL 13.9 15.5   HEMATOCRIT % 39.8 44.8   PLATELETS 10*3/mm3 105* 129*       Imaging Results (Last 24 Hours)     Procedure Component Value Units Date/Time    XR Chest 1 View [[id number]] Collected:  08/17/20 2302     Updated:  08/17/20 2307    Narrative:       XR CHEST 1 VW-  8/17/2020     HISTORY: Shortness of breath.     Heart size is within normal limits. There are some mild haziness of the  right lung base which may represent some mild atelectasis or developing  infiltrate. There is increased soft tissue in the right suprahilar  region which may represent the ill-defined mass seen on the CT scan of  5/29/2020.     Left lung appears relatively clear.       Impression:       Mild increased density of the right lung base may represent  mild atelectasis or developing pneumonia.  2. Increased soft tissue in the right suprahilar region may represent  the known soft tissue mass seen on the CT scan of 5/29/2020.  3. Please see dictation for the CT of the chest from 5/29/2020.     This report was finalized on 8/17/2020 11:04 PM by Dr. Jordy Little M.D.             Results for orders placed in visit on 10/24/16   Adult Transthoracic Echo Complete With Contrast    Narrative · Calculated EF = 67.5%. Estimated EF was in agreement with the calculated   EF. Normal left ventricular cavity size and wall thickness noted. All left   ventricular wall segments contract normally. Left ventricular diastolic   dysfunction is noted (grade II w/high LAP) consistent with   pseudonormalization.          ECG 12 Lead   Final Result   HEART RATE= 100  bpm   RR Interval= 656  ms   NJ Interval= 159  ms   P Horizontal Axis= 23  deg   P Front Axis= 107  deg   QRSD Interval= 92  ms   QT Interval= 338  ms   QRS Axis= 24  deg   T Wave Axis= 47  deg   - ABNORMAL ECG -   Sinus rhythm   Atrial premature complexes   Low voltage, extremity leads   When compared with ECG of 08-Jul-2020 17:01:21,   Significant rate increase otherwise  no change   Electronically Signed By: Christiano Calixto ([facility]) 18-Aug-2020 07:50:41   Date and Time of Study: 2020-08-17 22:23:28           Assessment/Plan     Active Hospital Problems    Diagnosis  POA   • **Acute urinary retention [R33.8]  Yes   • Elevated troponin [R79.89]  Yes     Priority: Medium   • ALS (amyotrophic lateral sclerosis) (CMS/[facility]) [G12.21]  Yes   • COPD (chronic obstructive pulmonary disease) (CMS/[facility]) [J44.9]  Yes   • Recurrent UTI [N39.0]  Yes   • Immobility [Z74.09]  Yes   • Rheumatoid arthritis involving multiple sites (CMS/[facility]) [M06.9]  Yes   • DNR (do not resuscitate) [Z66]  Yes   • Hyponatremia [E87.1]  Unknown   • Neuroendocrine carcinoma of lung (CMS/[facility]) [C7A.8]  Yes   • CARLITOS (obstructive sleep apnea) [G47.33]  Yes   • Recovering alcoholic (CMS/[facility]) [F10.21]  Yes   • Hypertension [I10]  Yes      Resolved Hospital Problems   No resolved problems to display.       Mr. Vazquez is a 65 y.o. with an extensive history as listed above and is a hospice patient.  He presents with anasarca, urinary retention, shortness of breath.  Jordan catheter placed in emergency room.  He has been given IV Lasix 40 mg x 1 and reports some improvement in edema/ dyspnea.    · Chest x-ray does demonstrate atelectasis of right lower lobe with significant anasarca. Elevated pro calcitonin but normal WBC, and afebrile. COVID19 is pending.    · Continue inhalers but change to nebulizers if negative COVID19 (low suspicion) Supplemental Oxygen. Hold on antibiotics at this time.   · Continue IV lasix, monitor renal function and volume status closely  · Continue Jordan catheter. Consult  his urologist. Monitor urine output.  · Urine Studies pending, no current infection. Monitor Na with diuresis   · Oncology consult, he is followed by Dr. Sandoval  · Hold Procardia given hypotension and aggressive diuresis  · Patient's troponin is elevated but he is asymptomatic. EKG reviewed. Given DNR/hospice status so trend troponin  but hold on cardiology consult.   · Speech therapy consult for likely dysphagia   · Patient request wife given a mission to spend the night since she is his primary caregiver and he feels more comfortable with her assisting him in all aspects of care   ·     · I discussed the patient's findings and my recommendations with patient, family, nursing staff and consulting provider.    VTE Prophylaxis - lovenox   Code Status -DNR status hospice patient       AGUSTÍN Jameson  [facility]  08/18/20  10:07

## 2020-08-19 PROBLEM — Z51.5 HOSPICE CARE PATIENT: Status: ACTIVE | Noted: 2020-01-01

## 2020-08-19 PROBLEM — G13.0: Status: ACTIVE | Noted: 2020-01-01

## 2020-08-19 NOTE — PLAN OF CARE
Problem: Patient Care Overview  Goal: Plan of Care Review  Outcome: Ongoing (interventions implemented as appropriate)  Flowsheets  Taken 8/19/2020 0407  Progress: no change  Outcome Summary: VSS, A&Ox4, c/o back pain, norco and morphine given, refuses to turn side to side, shifted weight frequently, c/o congestion, edema in BLE improving, will continue to monitor.  Taken 8/18/2020 2110  Plan of Care Reviewed With: patient

## 2020-08-19 NOTE — PLAN OF CARE
Problem: Patient Care Overview  Goal: Plan of Care Review  Flowsheets (Taken 8/19/2020 0936)  Plan of Care Reviewed With: patient  Outcome Summary: VFSS completed. Recommend mechanical soft no mixed consistencies and honey thick liquids via straw. Meds crushed with puree and honey thick liquid wash. Recommend upright, slow rate, alternate liquids/solids, and double swallow. Water protocol ok, 30 minutes after oral care between meals/meds. With negative lung changes or further concern for aspiration, recommend NPO with alternate means of nutrition. If patient refuses diet modifications, please allow diet per QOL. ST to follow for diet tolerance.    Patient was not wearing a face mask during this therapy encounter. Therapist used appropriate personal protective equipment including mask, eye protection and gloves.  Mask used was standard procedure mask. Appropriate PPE was worn during the entire therapy session. Hand hygiene was completed before and after therapy session. Patient is not in enhanced droplet precautions.

## 2020-08-19 NOTE — PROGRESS NOTES
[facility] GROUP INPATIENT PROGRESS NOTE  Subjective Patient continues to generally improve with urinary catheter in place    CC: : Small cell lung carcinoma    Admission for palliative care    Interval history:  Ryley Vazquez Jr. is a 65 y.o. male who we are asked to see today in consultation for his diagnosis of small cell lung carcinoma.  The patient is a 65 year-old male  with a history including rheumatoid arthritis in [id number].  He was reviewed April 15, 2020 by rheumatology recovering from a recurrent urinary tract and prostatic infection requiring multiple antibiotic therapies.  At this point he has began to have weakness and left upper extremity and undergoing assessment by neurology.  Visits with neurology had been hindered by the COVID-19 epidemic though he was seen May 11 by Dr. Kalia Doll with the patient's left arm weakness developing over a year to the point that his function had been severely compromised and now is developing bilateral leg weakness.  His history includes a previous posterior laminectomy for spinal stenosis 20 years previous at [facility] with numbness in the hands and feet subsequent to that though his recent symptoms were more severe.  Plans were made for radiologic tests including EMG, laboratory studies and MRI of the cervical and thoracic spine.  The patient was seen by neurosurgery May 14 having had these procedures done but having difficulty breathing and a portion of the plain films aborted.  He did have evidence of L5-S1 foraminal stenosis, central stenosis L4-5, L3-4 with central stenosis and lateral recess stenosis.  CT scan cervical spine looked normal and there was concern of possible ALS with the patient being sent to Dr. Kris Price at [facility].     His symptoms worsen however now respiratorily as well as the patient becoming considerably immobile with difficulty ambulating.     A follow-up CT scan of the chest revealed nodular lesions  extending from the medial aspect of the right apex to the right hilum with right hilar lymphadenopathy and consideration of likely lung cancer.  A CT-guided biopsy was attempted though there were problems obtaining this and the procedure was aborted.  The patient's case was discussed at thoracic conference and biopsy via mediastinoscopy was planned.At this point additional tests including MAG antibodies, ST PG antibodies, Purkinje cell/neuronal nuclear IgG and GM 1 antibodies were obtained with the GM 1 antibodies IgG/IgM quite elevated at 207.  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     The patient was to be seen in office August 6, 2020 and, unfortunately, has missed 2 appointments July 22 and now today August 6.  When he did not arrive I contacted him to discuss his circumstances in particular to make him aware that treatment for his small cell lung cancer would very likely improve his neurologic status.  In contacting him later in the morning he is fairly certain that he does not wish chemotherapy but was surprised about the possibility of improving his neuropathy.  As result he wishes to discuss this at a later appointment.  He is, however, deteriorating and would require hospice care now.  The patient was seen by telephone consultation August 18, 2020 and he indicated that he wished hospice consultation which was put into place that day.     The patient is now seen having been admitted August 18 presenting with increased swelling in the lower extremities, worsening shortness of breath, decreased urine output darkening in color.  Urinary catheter was placed and studies have gone on to demonstrate atelectasis in the right lower lobe with significant anasarca, elevated procalcitonin.  Patient admitted for additional palliative care and indicates that he is improved wonderfully with additional diuretic and  Jordan catheter placement.  The patient is seen August 19, 2020 further stabilized with the above approach.  Plans are to maintain the catheter and also have PCA placement after discussion with Dr. Ray Plaza.         Medications:  The current medication list was reviewed in the EMR.    Allergies:  No Known Allergies    Objective      Vitals:    08/19/20 0903   BP:    Pulse: 86   Resp: 20   Temp:    SpO2: 94%        Physical exam:   GENERAL:  Well-developed, well-nourished in no acute distress.  Patient is more sedated than previous  SKIN:  Warm, dry without rashes, purpura or petechiae.  HEAD:  Normocephalic.  EYES:  Pupils equal, round and reactive to light.  EOMs intact.  Conjunctivae normal.  EARS:  Hearing intact.  NOSE:  Septum midline.  No excoriations or nasal discharge.  MOUTH:  Tongue is well-papillated; no stomatitis or ulcers.  Lips normal.  THROAT:  Oropharynx without lesions or exudates.  NECK:  Supple with good range of motion; no thyromegaly or masses, no JVD.  LYMPHATICS:  No cervical, supraclavicular, axillary or inguinal adenopathy.  CHEST:  Lungs clear to percussion and auscultation. Good airflow.  CARDIAC:  Regular rate and rhythm without murmurs, rubs or gallops. Normal S1,S2.  ABDOMEN:  Soft, nontender with no organomegaly or masses.  EXTREMITIES: 2+ lower extremity edema (evidently improved after admission)  NEUROLOGICAL:  Cranial Nerves II-XII grossly intact.  No focal neurological deficits.  PSYCHIATRIC:  Normal affect and mood.        RECENT LABS:    Results from last 7 days   Lab Units 08/19/20  0743 08/18/20  0639 08/17/20  2215   WBC 10*3/mm3 9.25 10.08 8.38   HEMOGLOBIN g/dL 14.6 13.9 15.5   HEMATOCRIT % 44.0 39.8 44.8   PLATELETS 10*3/mm3 107* 105* 129*     Results from last 7 days   Lab Units 08/19/20  0743 08/18/20  0639 08/17/20  2215   SODIUM mmol/L 132* 129* 128*   POTASSIUM mmol/L 3.9 4.1 4.5   CHLORIDE mmol/L 86* 88* 87*   CO2 mmol/L 32.1* 27.2 26.9   BUN mg/dL 21 29* 31*    CREATININE mg/dL 0.52* 0.67* 0.94   CALCIUM mg/dL 8.9 9.0 9.9   BILIRUBIN mg/dL  --   --  2.3*   ALK PHOS U/L  --   --  119*   ALT (SGPT) U/L  --   --  44*   AST (SGOT) U/L  --   --  67*   GLUCOSE mg/dL 79 94 104*     Results from last 7 days   Lab Units 08/17/20  2215   MAGNESIUM mg/dL 1.8       Assessment/Plan     65-year-old male with a history of rheumatoid arthritis on therapy, recurrent urinary tract infections and more recent development into the spring of progressive neurologic dysfunction initially involving his left arm and, shortly thereafter, bilateral lower extremities.  This is undergone studies for his progressive neurologic deterioration he has been reviewed by neurology and neurosurgery and there has been concern about ALS.  Recently were a follow-up CT scan of the chest revealed lesions in the medial aspect of the right hilum with right hilar lymphadenopathy and consideration of lung carcinoma.  CT-guided biopsy was aborted as result of his inability to position properly secondary to general discomfort and his case was discussed in conference leading to mediastinoscopy and the eventual diagnosis of high-grade small cell neuroendocrine carcinoma by mediastinoscopy July 10, 2020.     He has not been able to attend now to appointments and he is contacted by home to determine his status and informed him that he might actually improve neurologically if we treat his small cell lung cancer.  We have proceeded with the least a portion of paraneoplastic testing with additional tests including MAG antibodies, ST PG antibodies, Purkinje cell/neuronal nuclear IgG and GM 1 antibodies were obtained with the GM 1 antibodies IgG/IgM quite elevated at 207.  After discussion with the patient by phone August 18 we went on to ask hospice to become involved in his care.     Fortunately he is now presented with generally worsening performance status including anasarca and urinary retention and increasing shortness of  breath.  At present palliative care options have been discussed and will continue.  The patient has clearly improved with Jordan catheter placement and diuretic use.  At present he continues his current palliative care options possibly returning home as possible.     As he is reassessed August 19, 2020 he does continue to improve per lower extremity edema and comfort.  His overall decline, however, is evident and I find it difficult to believe he would be able to be discharged home.  Plan to assess daily.            Christopher Sandoval MD  8/19/2020  09:24

## 2020-08-19 NOTE — PROGRESS NOTES
[facility] Visit Report    Ryley Vazquez Jr.  [id number]  8/19/2020    Admission R/T [facility] Dx: Yes    Reason for [facility] Admission: Lung Cancer    Symptom  Management: Pain/Dyspnea    Nursing/Medication Recommendations: Please start PCA for some level of pt control for comfort/restlessness.    Psychosocial Issues and Recommendations:    Spiritual Concerns and Recommendations:    [facility] Discharge Plans:  None.  Pt meets GIP criteria and requires increased level of IV meds for comfort.    Review of Visit :  Pt is a pps of 20% and quickly declining.  Pt has moments of confusion today and moments of being very clear on goals of care.  Pt appears very short of air and in during visit RN placed HOB to 90 degrees for pt to catch his breath.  Pt decided that he wants to have just strictly comfort care and realizes that the more awake and alert that he is the more chance of being restless and short of air which he currently appears to be.  Spouse is present in room and she is very agreeable to strictly comfort care and is unable to care for him at this level at home.  Pt does want some level of control and RN discussed what a PCA would look like and he states that would be very good and would appreciate the thought of being able to visualize what med that he was getting and is agreeable to being on the groggy side in exchange for being able to not choke on saliva or having dyspnea.  RN collaborated with both Zuleyma WISDOM and Jorge WISDOM regarding pt goals of care with both verbalizing understanding.  [facility] to follow pt daily.        Nino Andersen RN

## 2020-08-19 NOTE — PROGRESS NOTES
[facility]  Travis Schwab MD     LOS: 1 day   Patient Care Team:  Braulio Sorto MD as PCP - General  Braulio Sorto MD as PCP - Claims Attributed  Erik Mclaughlin MD as Consulting Physician (Allergy and Immunology)  Bayron Jones III, MD as Referring Physician (Thoracic Surgery)  Christopher Sandoval MD as Consulting Physician (Hematology and Oncology)        Subjective     Interval History:     Patient Complaints:  Metastatic small cell carcinoma of the lung, urinary retention.  History taken from: patient chart family overall he feels a little better today.  He got a little rest last night.  Says his feet and legs are not as swollen.  Tolerating catheter fine.    Review of Systems:   All systems were reviewed and were negative except for urinary retention.  Anasarca.  Some increased shortness of breath on admission but a little better now.  No abdominal pain.  No chest pain.    Objective     Vital Signs  Temp:  [97.3 °F (36.3 °C)] 97.3 °F (36.3 °C)  Heart Rate:  [76-87] 86  Resp:  [18-20] 20  BP: (117)/(72) 117/72    Physical Exam:     General Appearance:    Alert, cooperative, in no acute distress   Head:    Normocephalic, without obvious abnormality, atraumatic   Eyes:            Lids and lashes normal, conjunctivae and sclerae normal, no   icterus, no pallor, corneas clear, PERRLA   Ears:    Ears appear intact with no abnormalities noted   Throat:   No oral lesions, no thrush, oral mucosa moist   Neck:   No adenopathy, supple, trachea midline,    Back:     No kyphosis present, no scoliosis present, no skin lesions,      erythema or scars, no tenderness to percussion or                   palpation,   range of motion normal   Lungs:    Bilateral rales and wheezing    Heart:    Regular rhythm and normal rate, normal S1 and S2, no            murmur, no gallop, no rub, no click   Chest Wall:    No abnormalities observed   Abdomen:     Normal bowel sounds, no masses, no organomegaly, soft         non-tender, non-distended, no guarding, no rebound                tenderness   Genital/Rectal:    Catheter in place.  Urine output clear.   Extremities:   Moves all extremities well, bilateral lower extremity edema, no cyanosis, no             redness       Skin:   No bleeding, bruising or rash       Neurologic:   Cranial nerves 2 - 12 grossly intact, sensation intact,        Results Review:     I reviewed the patient's new clinical results.  Discussed with The family.  His creatinine and kidney function appear good.    Recent Results (from the past 24 hour(s))   Troponin    Collection Time: 08/18/20 12:26 PM   Result Value Ref Range    Troponin T 0.048 (C) 0.000 - 0.030 ng/mL   CBC (No Diff)    Collection Time: 08/19/20  7:43 AM   Result Value Ref Range    WBC 9.25 3.40 - 10.80 10*3/mm3    RBC 4.91 4.14 - 5.80 10*6/mm3    Hemoglobin 14.6 13.0 - 17.7 g/dL    Hematocrit 44.0 37.5 - 51.0 %    MCV 89.6 79.0 - 97.0 fL    MCH 29.7 26.6 - 33.0 pg    MCHC 33.2 31.5 - 35.7 g/dL    RDW 13.7 12.3 - 15.4 %    RDW-SD 44.8 37.0 - 54.0 fl    MPV 10.5 6.0 - 12.0 fL    Platelets 107 (L) 140 - 450 10*3/mm3   Basic Metabolic Panel    Collection Time: 08/19/20  7:43 AM   Result Value Ref Range    Glucose 79 65 - 99 mg/dL    BUN 21 8 - 23 mg/dL    Creatinine 0.52 (L) 0.76 - 1.27 mg/dL    Sodium 132 (L) 136 - 145 mmol/L    Potassium 3.9 3.5 - 5.2 mmol/L    Chloride 86 (L) 98 - 107 mmol/L    CO2 32.1 (H) 22.0 - 29.0 mmol/L    Calcium 8.9 8.6 - 10.5 mg/dL    eGFR Non African Amer >150 >60 mL/min/1.73    BUN/Creatinine Ratio 40.4 (H) 7.0 - 25.0    Anion Gap 13.9 5.0 - 15.0 mmol/L       Medication Review:   Current Facility-Administered Medications:   •  acetaminophen (TYLENOL) tablet 650 mg, 650 mg, Oral, Q4H PRN **OR** acetaminophen (TYLENOL) 160 MG/5ML solution 650 mg, 650 mg, Oral, Q4H PRN **OR** acetaminophen (TYLENOL) suppository 650 mg, 650 mg, Rectal, Q4H PRN, Nancy Norman APRN  •  bisacodyl (DULCOLAX) EC tablet 5 mg, 5 mg,  Oral, Daily PRN, Nancy Norman APRN  •  budesonide-formoterol (SYMBICORT) 160-4.5 MCG/ACT inhaler 2 puff, 2 puff, Inhalation, BID - RT, Flip Rendon MD, 2 puff at 08/19/20 0903  •  calcium carbonate (TUMS) chewable tablet 500 mg (200 mg elemental), 2 tablet, Oral, BID PRN, Nancy Norman APRN  •  enoxaparin (LOVENOX) syringe 40 mg, 40 mg, Subcutaneous, Q24H, Anjelica Serrano APRN, 40 mg at 08/18/20 1231  •  fluticasone (FLONASE) 50 MCG/ACT nasal spray 1 spray, 1 spray, Each Nare, Daily, Flip Rendon MD, 1 spray at 08/19/20 0919  •  folic acid (FOLVITE) tablet 1 mg, 1 mg, Oral, Daily, Flip Rendon MD, 1 mg at 08/19/20 0921  •  furosemide (LASIX) injection 40 mg, 40 mg, Intravenous, Q12H, Flip Rendon MD, 40 mg at 08/19/20 0550  •  guaiFENesin (MUCINEX) 12 hr tablet 1,200 mg, 1,200 mg, Oral, BID, Flip Rendon MD, 1,200 mg at 08/19/20 0921  •  HYDROcodone-acetaminophen (NORCO) 7.5-325 MG per tablet 1 tablet, 1 tablet, Oral, Q6H PRN, Anjelica Serrano APRN, 1 tablet at 08/19/20 0557  •  ipratropium-albuterol (DUO-NEB) nebulizer solution 3 mL, 3 mL, Nebulization, Q4H PRN, Flip Rendon MD  •  ipratropium-albuterol (DUO-NEB) nebulizer solution 3 mL, 3 mL, Nebulization, 4x Daily - RT, Flip Rendon MD, 3 mL at 08/19/20 1145  •  montelukast (SINGULAIR) tablet 10 mg, 10 mg, Oral, Nightly, Flip Rendon MD, 10 mg at 08/18/20 2105  •  Morphine sulfate (PF) injection 4 mg, 4 mg, Intravenous, Q4H PRN, Anjelica Serrano APRN, 4 mg at 08/19/20 0916  •  ondansetron (ZOFRAN) tablet 4 mg, 4 mg, Oral, Q6H PRN **OR** ondansetron (ZOFRAN) injection 4 mg, 4 mg, Intravenous, Q6H PRN, Nancy Norman APRN  •  Pharmacy to Dose enoxaparin (LOVENOX), , Does not apply, Continuous PRN, Anjelica Serrano, APRZOHREH  •  [COMPLETED] Insert peripheral IV, , , Once **AND** sodium chloride 0.9 % flush 10 mL, 10 mL, Intravenous, PRN, Jose Quinteros MD, 10 mL at 08/17/20 0771    Assessment/Plan       Acute  urinary retention    CARLITOS (obstructive sleep apnea)    Hypertension    Neuroendocrine carcinoma of lung (CMS/HCC)    ALS (amyotrophic lateral sclerosis) (CMS/HCC)    COPD (chronic obstructive pulmonary disease) (CMS/HCC)    Recovering alcoholic (CMS/HCC)    Recurrent UTI    Immobility    Rheumatoid arthritis involving multiple sites (CMS/HCC)    Elevated troponin    DNR (do not resuscitate)    Hyponatremia          Plan for disposition:The patient strongly desires to continue with his catheter.  He is nonambulatory.  He relates he is going into hospice care.  Depending on his clinical course, I will try to see him next week.  If needed sooner do not hesitate to call us.    Travis Schwab MD  08/19/20  11:46      Time: 25

## 2020-08-19 NOTE — H&P
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    History of present illness:  The patient is a 65 y.o. male who began to have weakness and left upper extremity and undergoing assessment by neurology. The patient was seen May 11 by Dr. Kalia Doll with the patient's left arm weakness developing over a year to the point that his function had been severely compromised and now is developing bilateral leg weakness.  His history includes a previous posterior laminectomy for spinal stenosis 20 years previous at [facility] with numbness in the hands and feet subsequent to that though his recent symptoms were more severe. There was concern of possible ALS with the patient being sent to Dr. Kris Price at [facility]. He was never seen. CT scan of the chest revealed nodular lesions extending from the medial aspect of the right apex to the right hilum with right hilar lymphadenopathy and consideration of likely lung cancer. 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  Medical oncology did discuss with the patient and ultimately, the patient opted for hospice care.  The patient was admitted 8/17/2020 due to increased  swelling in his lower extremities and worsening shortness of breath and decreased urine output.  Jordan catheter was placed and diuretics given in the patient's above conditions has improved.  However, the patient still has shortness of air due to decreased lung volumes.  The patient wishes symptom management for comfort.  I was asked to assume the patient's care.  I did review the case with Dr. Christopher Sandoval.    At the time of my examination, the patient's wife is at bedside.  The RN was at bedside.  The patient appears short of breath and has low tidal volume breathing.  The patient is weak.  He is sitting up at 30 degrees.  He states he wants to stretch out?  He has a very weak cough with rhonchi.  He describes no chest pain to suggest angina.  He did drink water while I was entering the room and coughing occurred representing aspiration.    Review of Systems  Pertinent items are noted in HPI    Palliative Performance Scale  Palliative Performance Scale Score: 30%  [place] Symptom Assessment System Revised  Pain Score: 3   ESAS Tiredness Score: No tiredness  ESAS Nausea Score: No nausea  ESAS Depression Score: unable to assess  ESAS Anxiety Score: 3  ESAS Drowsiness Score: No drowsiness  ESAS Lack of Appetite Score: No lack of appetite  ESAS Wellbeing Score: unable to assess  ESAS Dyspnea Score: unable to assess  ESAS Other Problem Score: unable to assess  ESAS Source of Information: patient, family caregiver, healthcare professional caregiver  ESAS Intervention: medicated/see MAR  ESAS Intervention Response: tolerated    History  Past Medical History:   Diagnosis Date   • Arthritis     rheumatoid   • Asthmatic bronchitis    • Cancer (CMS/HCC)    • COPD (chronic obstructive pulmonary disease) (CMS/HCC)    • Difficulty walking    • GERD (gastroesophageal reflux disease)    • History of gastritis    • Hyperlipidemia    • Hypertension    • Lung cancer (CMS/HCC)     STAGE III   • Movement disorder    • Multifocal  motor neuropathy (CMS/HCC)    • Numbness and tingling of upper and lower extremities of both sides    • Recovering alcoholic (CMS/HCC) 07/22/2019    LAST DRINK 07-   • Sinusitis    • Sleep apnea     cpap   ,   Past Surgical History:   Procedure Laterality Date   • ABDOMINAL SURGERY     • ABDOMINAL SURGERY     • CERVICAL FUSION N/A 2005   • CERVICAL LAMINECTOMY     • COLONOSCOPY  2014    polyps   • GASTRIC BYPASS     • HERNIA REPAIR     • MEDIASTINOSCOPY N/A 7/10/2020    Procedure: MEDIASTINOSCOPY WITH BIOPSY;  Surgeon: Bayron Jones III, MD;  Location: [facility] OR;  Service: Thoracic;  Laterality: N/A;   • ROTATOR CUFF REPAIR Right 1985   ,   Family History   Problem Relation Age of Onset   • Asthma Mother    • Cancer Father         COLON   • Arthritis Father    • Hypertension Father    • Diabetes Father    • Heart disease Brother    • Cancer Other    • Arthritis Other    • Alcohol abuse Other    • Hypertension Maternal Grandmother    • Diabetes Maternal Grandmother    • Stroke Paternal Grandmother    • Hypertension Paternal Grandmother    • Diabetes Paternal Grandmother    • Heart disease Paternal Grandfather    • Malig Hyperthermia Neg Hx     and   Social History     Tobacco Use   • Smoking status: Current Every Day Smoker     Packs/day: 1.00     Years: 50.00     Pack years: 50.00     Types: Cigarettes     Start date: 1968   • Smokeless tobacco: Never Used   • Tobacco comment: Started smoking at age 13.   Substance Use Topics   • Alcohol use: Yes     Comment: RECOVERING ALCOHOLIC 07-   • Drug use: No       Vital Signs   Temp:  [97.1 °F (36.2 °C)] 97.1 °F (36.2 °C)  Heart Rate:  [] 100  Resp:  [20-22] 22  BP: (134)/(82) 134/82  Device (Oxygen Therapy): nasal cannulaFlow (L/min):  [2-4] 4SpO2:  [91 %-98 %] 91 %    Physical Exam:  General Appearance:   Awake and appears weak and short of air    Head:    Normocephalic, without obvious abnormality, atraumatic   Eyes:            Lids and  lashes normal, conjunctivae and sclerae normal, no   icterus   Ears:    Ears appear intact with no abnormalities noted   Throat:   No oral lesions, oral mucosa moist   Neck:   No adenopathy, supple, trachea midline, no thyromegaly   Back:     No scoliosis present   Lungs:     Clear to auscultation, respirations diminished and slightly labored, weak cough with rhonchi     Heart:    Regular rhythm and tachycardia        Abdomen:   Occasional bowel sounds, soft and non-tender, non-distended   Genitalia:    Deferred   Extremities:   Minimal if any pretibial edema, no cyanosis    Pulses:  Radial pulses palpable and equal bilaterally   Skin:   No bleeding         Neurologic:   Awake and appears oriented with diffuse weakness      Results Review:   I reviewed the patient's new clinical results.      Impression:      Neuroendocrine carcinoma of lung (CMS/HCC)    Hospice care patient    Paraneoplastic neuromyopathy and neuropathy (CMS/HCC)    Acute urinary retention    COPD (chronic obstructive pulmonary disease) (CMS/HCC)    Rheumatoid arthritis involving multiple sites (CMS/HCC)    Elevated troponin    CARLITOS (obstructive sleep apnea)    Hypertension    Recovering alcoholic (CMS/HCC)    Immobility    Hyponatremia        Plan:  I reviewed the patient's chart and the present admission.  I reviewed with the patient and his wife at bedside.  I reviewed with the RN.  The patient appears weak and short of air.  Jordan catheter placement corrected acute urinary retention on admission.  The patient continues to take some p.o. medications.  The patient has taken to Norco seven-point 5-3 2 5 tablets thus far today.  The patient has required 2 doses of 1 mg morphine thus far today.  The patient has received 1 dose of 0.5 mg Ativan thus far today.  Due to the patient's multiple underlying problems, his overall condition is tenuous.    Initially, after reviewing with the RN and reading the hospice RN note from today, morphine PCA was going  to be offered.  Subsequently, I was called that the patient's condition was worsening and PCA was discontinued because the patient could not push the button.  We will continue as needed morphine and Ativan.      Ray Plaza MD  Hospice and Palliative Medicine  08/19/20  20:25

## 2020-08-19 NOTE — MBS/VFSS/FEES
Acute Care - Speech Language Pathology   Swallow Initial Evaluation [facility]     Patient Name: Ryley Vazquez Jr.  : 1955  MRN: [id number]  Today's Date: 2020               Admit Date: 2020    Visit Dx:     ICD-10-CM ICD-9-CM   1. Acute urinary retention R33.8 788.29   2. Elevated troponin R79.89 790.6   3. Neuroendocrine carcinoma of lung (CMS/HCC) C7A.8 209.21     Patient Active Problem List   Diagnosis   • CARLITOS (obstructive sleep apnea)   • Sinusitis   • Hypertension   • Asthmatic bronchitis   • Arthritis   • Osteoarthritis of multiple joints   • Class 3 severe obesity with body mass index (BMI) of 40.0 to 44.9 in adult (CMS/HCC)   • Cervical spondylosis with myelopathy   • Neuroendocrine carcinoma of lung (CMS/HCC)   • Acute urinary retention   • ALS (amyotrophic lateral sclerosis) (CMS/HCC)   • COPD (chronic obstructive pulmonary disease) (CMS/HCC)   • Recovering alcoholic (CMS/HCC)   • Recurrent UTI   • Immobility   • Rheumatoid arthritis involving multiple sites (CMS/HCC)   • Elevated troponin   • DNR (do not resuscitate)   • Hyponatremia     Past Medical History:   Diagnosis Date   • Arthritis     rheumatoid   • Asthmatic bronchitis    • Cancer (CMS/HCC)    • COPD (chronic obstructive pulmonary disease) (CMS/HCC)    • Difficulty walking    • GERD (gastroesophageal reflux disease)    • History of gastritis    • Hyperlipidemia    • Hypertension    • Lung cancer (CMS/HCC)     STAGE III   • Movement disorder    • 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HCC)    • Numbness and tingling of upper and lower extremities of both sides    • Recovering alcoholic (CMS/HCC) 2019    LAST DRINK 2019   • Sinusitis    • Sleep apnea     cpap     Past Surgical History:   Procedure Laterality Date   • ABDOMINAL SURGERY     • ABDOMINAL SURGERY     • CERVICAL FUSION N/A    • CERVICAL LAMINECTOMY     • COLONOSCOPY      polyps   • GASTRIC BYPASS     • HERNIA REPAIR     • MEDIASTINOSCOPY N/A  7/10/2020    Procedure: MEDIASTINOSCOPY WITH BIOPSY;  Surgeon: Bayron Jones III, MD;  Location: [facility] MAIN OR;  Service: Thoracic;  Laterality: N/A;   • ROTATOR CUFF REPAIR Right 1985        SWALLOW EVALUATION (last 72 hours)      SLP Adult Swallow Evaluation     Row Name 08/19/20 0800 08/18/20 1000          Document Type  evaluation  -OC  evaluation  -SH    Subjective Information  no complaints  -OC  --    Patient Observations  alert;cooperative;agree to therapy  -OC  --    Patient Effort  good  -OC  excellent  -SH    Symptoms Noted During/After Treatment  none  -OC  --          Patient Profile Reviewed  yes  -OC  yes  -SH    Pertinent History Of Current Problem  --  Urinary retention, probable ALS, lung CA  -SH    Current Method of Nutrition  soft textures;thin liquids  -OC  regular textures;thin liquids  -SH    Precautions/Limitations, Vision  WFL;for purposes of eval  -OC  WFL;for purposes of eval  -SH    Precautions/Limitations, Hearing  WFL;for purposes of eval  -OC  WFL;for purposes of eval  -SH    Prior Level of Function-Communication  WFL  -OC  WFL  -    Prior Level of Function-Swallowing  --  other (see comments) trouble with mastication  -SH    Plans/Goals Discussed with  patient  -OC  patient;spouse/S.O.;agreed upon;other (see comments) wife via phone  -    Barriers to Rehab  --  previous functional deficit hospice  -    Patient's Goals for Discharge  patient did not state  -OC  patient did not state  -    Family Goals for Discharge  --  family did not state  -          Dentition Assessment  --  missing teeth  -    Volitional Swallow  --  unable to elicit  -    Volitional Cough  --  weak  -SH          Oral Motor General Assessment  --  generalized oral motor weakness  -SH          Clinical Swallow Evaluation Summary  --  Patient seen for clinical swallow assessment. Pt with known lung cancer and probable ALS per chart, unable to feed himself. Despite hospice status, patient was  "agreeable to swallow assessment. Wife indicated the patient has no difficulty with liquids, but will masticate meat and \"spit it out\". Pt reports difficulty with mastication d/t missing dentition. Patient was on regular and thins during assessment. Baseline cough. Laryngeal elevation appeared appropriate. No overt s/s of aspiration with ice or thins via spoon. Belching and suspected backflow observed with thins and nectar via straw with delayed cough. Wet voice also appreciated with thins. Belching persisted with honey via straw, but no coughing or voice change appreciated. Adequate AP transit with puree. Rotary and munching mastication with mech soft and regular. No oral residue post swallow. SLP recs regular, ground meats, and honey. Pt refused diet modifications until results from VFSS. Of note, RD change patient's diet to mech soft, ground meats while SLP was evaluating the patient. Will follow for VFSS next date per patient request.  -          VFSS Summary  Patient presents with moderate oropharyngeal dysphagia characterized by mistiming, decreased base of tongue retraction, reduced pharyngeal constriction, and decreased glottic closure. Patient demonstrated prespill inconsistently with nectar thick via straw, honey via straw, and thin via straw. Patient demonstrated no penetration or aspiration on initial trials of liquids via straw. No penetration or aspiration puree bolus. Patient demonstrated silent penetration and silent aspiration of thin and nectar thick liquids as liquid wash to puree boluses. No penetration or aspiration with honey thick liquid wash to puree and mechanical soft. Patient demonstrated mild pharyngeal residue status post double swallow with liquid wash.  -OC  --          Summary Statement  Patient presents with moderate oropharyngeal dysphagia characterized by mistiming, decreased base of tongue retraction, reduced pharyngeal constriction, and decreased glottic closure. Patient " demonstrated prespill inconsistently with nectar thick via straw, honey via straw, and thin via straw. Patient demonstrated no penetration or aspiration on initial trials of liquids via straw. No penetration or aspiration puree bolus. Patient demonstrated silent penetration and silent aspiration of thin and nectar thick liquids as liquid wash to puree boluses. No penetration or aspiration with honey thick liquid wash to puree and mechanical soft. Patient demonstrated mild pharyngeal residue status post double swallow with liquid wash.  -OC  --          SLP Swallowing Diagnosis  oral dysfunction;pharyngeal dysfunction;moderate  -OC  suspected pharyngeal dysfunction  -    Functional Impact  risk of aspiration/pneumonia  -OC  risk of aspiration/pneumonia  -    Rehab Potential/Prognosis, Swallowing  good, to achieve stated therapy goals  -OC  good, to achieve stated therapy goals  -    Swallow Criteria for Skilled Therapeutic Interventions Met  demonstrates skilled criteria  -OC  demonstrates skilled criteria  -          Therapy Frequency (Swallow)  PRN  -OC  PRN  -    Predicted Duration Therapy Intervention (Days)  until discharge  -OC  until discharge  -    SLP Diet Recommendation  mechanical soft with no mixed consistencies;honey thick liquids;other (see comments) if patient refuses modifications, allow PO for QOL  -OC  honey thick liquids;regular textures;other (see comments) Ground meats, pt refused until VFSS  -    Recommended Diagnostics  --  reassess via VFSS ([facility])  -    Recommended Precautions and Strategies  upright posture during/after eating;volitional throat clear  -  upright posture during/after eating;volitional throat clear  -    SLP Rec. for Method of Medication Administration  meds crushed;with pudding or applesauce and honey thick liquid wash  -OC  meds whole;as tolerated  -    Monitor for Signs of Aspiration  yes;notify SLP if any concerns  -OC  yes;notify SLP if any concerns   -    Anticipated Dischage Disposition (SLP)  other (see comments) home with hospice  -OC  other (see comments) HOME WITH HOSPICE  -          Oral Nutrition/Hydration Goal Selection (SLP)  --  oral nutrition/hydration, SLP goal 1  -          Oral Nutrition/Hydration Goal 1, SLP  Pt will alec PO without overt s/s of asp.  -OC  Pt will alec PO without overt s/s of asp.  -    Time Frame (Oral Nutrition/Hydration Goal 1, SLP)  by discharge  -OC  by discharge  -      User Key  (r) = Recorded By, (t) = Taken By, (c) = Cosigned By    Initials Name Effective Dates    Cristina Garcia MA,[facility]-SLP 06/08/18 -     SH Shannon Arenas MS CCC-SLP 03/07/18 -           EDUCATION  The patient has been educated in the following areas:   Dysphagia (Swallowing Impairment).    SLP Recommendation and Plan  SLP Swallowing Diagnosis: oral dysfunction, pharyngeal dysfunction, moderate  SLP Diet Recommendation: mechanical soft with no mixed consistencies, honey thick liquids, other (see comments)(if patient refuses modifications, allow PO for QOL)  Recommended Precautions and Strategies: upright posture during/after eating, volitional throat clear  SLP Rec. for Method of Medication Administration: meds crushed, with pudding or applesauce(and honey thick liquid wash)     Monitor for Signs of Aspiration: yes, notify SLP if any concerns     Swallow Criteria for Skilled Therapeutic Interventions Met: demonstrates skilled criteria  Anticipated Dischage Disposition (SLP): other (see comments)(home with hospice)  Rehab Potential/Prognosis, Swallowing: good, to achieve stated therapy goals  Therapy Frequency (Swallow): PRN  Predicted Duration Therapy Intervention (Days): until discharge       Plan of Care Reviewed With: patient  Outcome Summary: VFSS completed. Recommend mechanical soft no mixed consistencies and honey thick liquids via straw. Meds crushed with puree and honey thick liquid wash. Recommend upright, slow rate, alternate  liquids/solids, and double swallow. Water protocol ok, 30 minutes after oral care between meals/meds. With negative lung changes or further concern for aspiration, recommend NPO with alternate means of nutrition. If patient refuses diet modifications, please allow diet per QOL. ST to follow for diet tolerance.    SLP GOALS     Row Name 08/19/20 0800 08/18/20 1000          Oral Nutrition/Hydration Goal 1 (SLP)    Oral Nutrition/Hydration Goal 1, SLP  Pt will alec PO without overt s/s of asp.  -OC  Pt will alec PO without overt s/s of asp.  -     Time Frame (Oral Nutrition/Hydration Goal 1, SLP)  by discharge  -OC  by discharge  -       User Key  (r) = Recorded By, (t) = Taken By, (c) = Cosigned By    Initials Name Provider Type    Cristina Garcia MA,CCC-SLP Speech and Language Pathologist     Shannon Arenas MS CCC-SLP Speech and Language Pathologist           SLP Outcome Measures (last 72 hours)      SLP Outcome Measures     Row Name 08/19/20 0800 08/18/20 1100          SLP Outcome Measures    Outcome Measure Used?  Adult NOMS  -OC  Adult NOMS  -        Adult FCM Scores    FCM Chosen  Swallowing  -OC  Swallowing  -     Swallowing FCM Score  3  -OC  3  -       User Key  (r) = Recorded By, (t) = Taken By, (c) = Cosigned By    Initials Name Effective Dates    Cristina Garcia MA,CCC-SLP 06/08/18 -      Shannon Arenas MS CCC-SLP 03/07/18 -            Time Calculation:   Time Calculation- SLP     Row Name 08/19/20 0939             Time Calculation- SLP    SLP Start Time  0800  -OC      SLP Received On  08/19/20  -OC        User Key  (r) = Recorded By, (t) = Taken By, (c) = Cosigned By    Initials Name Provider Type    Cristina Garcia MA,CCC-SLP Speech and Language Pathologist          Therapy Charges for Today     Code Description Service Date Service Provider Modifiers Qty    [id number]  ST MOTION FLUORO EVAL SWALLOW 6 8/19/2020 Cristina Fonseca MA,CCC-SLP GN 1               Cristina Fonseca  MA,CCC-SLP  8/19/2020

## 2020-08-19 NOTE — PLAN OF CARE
Problem: Patient Care Overview  Goal: Plan of Care Review  Outcome: Ongoing (interventions implemented as appropriate)  Flowsheets (Taken 8/19/2020 1901)  Outcome Summary: Pt A&Ox3, able to make needs known early in the day. Pt failed swallow study this morning. Pt feeling conflicted this morning, considering peg tube placement. AFter discussion with hospice nurse and spouse, Pt decided his goals were to remain comfort measures only. Pt having increased SOB and anxiety this aftenoon/evening. IV morphine and ativan given with good results. Pt has been less responsive this evening, breathing more agonal in appearance, some congestion beginning. Pt's wife aware that Pt could pass quickly. Pt's wife stated that Pt told her today that he might die tonight. On call  paged for sacrement of the sick per wife's request.

## 2020-08-19 NOTE — PROGRESS NOTES
Discussed briefly with Dr. Plaza.  He will assume role of attending in this Hospice patient.  [facility] will not follow.  Thanks.     Flip Rendon MD  08/19/20  3:26 PM
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
## 2020-08-20 NOTE — PROGRESS NOTES
Case Management Discharge Note      Final Note: The patient  on 2020 @ 06:45. NATANAEL Frederick RN CCP         Destination - Selection Complete      Service Provider Request Status Selected Services Address Phone Number Fax Number    [facility] Selected Inpatient Hospice [address], [place] [id number] [phone]      Durable Medical Equipment      No service has been selected for the patient.      Dialysis/Infusion      No service has been selected for the patient.      Home Medical Care      No service has been selected for the patient.      Therapy      No service has been selected for the patient.      Community Resources      No service has been selected for the patient.             Final Discharge Disposition Code: 41 -  in medical facility

## 2020-08-20 NOTE — DISCHARGE SUMMARY
Discharge As      Date of Admisssion:  2020  Date of Death:  2020  Time of Death:  6:45 AM    Patient Care Team:  Braulio Sorto MD as PCP - General  Braulio Sorto MD as PCP - Claims Attributed  Erik Mclaughlin MD as Consulting Physician (Allergy and Immunology)  Bayron Jones III, MD as Referring Physician (Thoracic Surgery)  Christopher Sandoval MD as Consulting Physician (Hematology and Oncology)    Final Diagnosis:     Neuroendocrine carcinoma of lung (CMS/HCC)    Hospice care patient    Paraneoplastic neuromyopathy and neuropathy (CMS/HCC)    Acute urinary retention    COPD (chronic obstructive pulmonary disease) (CMS/HCC)    Rheumatoid arthritis involving multiple sites (CMS/HCC)    Elevated troponin    CARLITOS (obstructive sleep apnea)    Hypertension    Recovering alcoholic (CMS/HCC)    Immobility    Hyponatremia    Death in hospice      Hospital Course  Patient was a 65 y.o. male who I initially saw yesterday. Please see my H&P performed 2020 at 1500. Symptom management provided. Subsequently, I was called earlier this AM that the patient's respirations ceased and no pulse palpable. No heart sounds audible. I pronounced the patient at 0645 hours.      Ray Plaza MD  Hospice and Palliative Medicine  20  08:25

## 2020-08-20 NOTE — PROGRESS NOTES
Case Management Discharge Note      Final Note: The patient  on 2020 at 6:45a.m and is from Home with [facility].  [facility] note states states that patient meets GIP criteria with goal of comfort.  RHONDA Ivey         Destination - Selection Complete      Service Provider Request Status Selected Services Address Phone Number Fax Number    [facility] Selected Inpatient Hospice [address], [place] [id number] [phone]      Durable Medical Equipment      No service has been selected for the patient.      Dialysis/Infusion      No service has been selected for the patient.      Home Medical Care      No service has been selected for the patient.      Therapy      No service has been selected for the patient.      Community Resources      No service has been selected for the patient.             Final Discharge Disposition Code: 51 - hospice medical facility

## 2020-08-20 NOTE — PLAN OF CARE
Problem: Patient Care Overview  Goal: Plan of Care Review  Outcome: Ongoing (interventions implemented as appropriate)  Flowsheets  Taken 8/20/2020 0350  Progress: declining  Outcome Summary: Patient very lethargic but will response to voice at times, breathing is very agonal, oral care frequently, wife and son at bedside, morphine and ativan for comfort, turn q4hrs, will continue to monitor.  Taken 8/19/2020 2025  Plan of Care Reviewed With: patient;spouse

## 2020-08-31 ENCOUNTER — APPOINTMENT (OUTPATIENT)
Dept: LAB | Facility: HOSPITAL | Age: 65
End: 2020-08-31
